# Patient Record
Sex: MALE | Race: WHITE | NOT HISPANIC OR LATINO | Employment: OTHER | URBAN - METROPOLITAN AREA
[De-identification: names, ages, dates, MRNs, and addresses within clinical notes are randomized per-mention and may not be internally consistent; named-entity substitution may affect disease eponyms.]

---

## 2024-02-03 ENCOUNTER — APPOINTMENT (OUTPATIENT)
Dept: RADIOLOGY | Facility: HOSPITAL | Age: 64
DRG: 871 | End: 2024-02-03
Payer: COMMERCIAL

## 2024-02-03 ENCOUNTER — HOSPITAL ENCOUNTER (INPATIENT)
Facility: HOSPITAL | Age: 64
LOS: 3 days | Discharge: HOME | DRG: 871 | End: 2024-02-07
Attending: STUDENT IN AN ORGANIZED HEALTH CARE EDUCATION/TRAINING PROGRAM | Admitting: INTERNAL MEDICINE
Payer: COMMERCIAL

## 2024-02-03 ENCOUNTER — APPOINTMENT (OUTPATIENT)
Dept: CARDIOLOGY | Facility: HOSPITAL | Age: 64
DRG: 871 | End: 2024-02-03
Payer: COMMERCIAL

## 2024-02-03 DIAGNOSIS — I63.89 OTHER CEREBRAL INFARCTION (MULTI): ICD-10-CM

## 2024-02-03 DIAGNOSIS — N17.9 AKI (ACUTE KIDNEY INJURY) (CMS-HCC): ICD-10-CM

## 2024-02-03 DIAGNOSIS — E83.42 HYPOMAGNESEMIA: ICD-10-CM

## 2024-02-03 DIAGNOSIS — I48.0 AF (PAROXYSMAL ATRIAL FIBRILLATION) (MULTI): ICD-10-CM

## 2024-02-03 DIAGNOSIS — R11.14 BILIOUS VOMITING WITHOUT NAUSEA: ICD-10-CM

## 2024-02-03 DIAGNOSIS — R78.81 BACTEREMIA: ICD-10-CM

## 2024-02-03 DIAGNOSIS — I48.19 OTHER PERSISTENT ATRIAL FIBRILLATION (MULTI): ICD-10-CM

## 2024-02-03 DIAGNOSIS — R41.0 DISORIENTATION, UNSPECIFIED: ICD-10-CM

## 2024-02-03 DIAGNOSIS — R41.82 ALTERED MENTAL STATUS, UNSPECIFIED ALTERED MENTAL STATUS TYPE: ICD-10-CM

## 2024-02-03 DIAGNOSIS — E87.6 HYPOKALEMIA: Primary | ICD-10-CM

## 2024-02-03 DIAGNOSIS — E53.8 B12 DEFICIENCY: ICD-10-CM

## 2024-02-03 DIAGNOSIS — R09.89 OTHER SPECIFIED SYMPTOMS AND SIGNS INVOLVING THE CIRCULATORY AND RESPIRATORY SYSTEMS: ICD-10-CM

## 2024-02-03 LAB
ALBUMIN SERPL BCP-MCNC: 3.8 G/DL (ref 3.4–5)
ALP SERPL-CCNC: 53 U/L (ref 33–136)
ALT SERPL W P-5'-P-CCNC: 18 U/L (ref 10–52)
ANION GAP BLDV CALCULATED.4IONS-SCNC: 9 MMOL/L (ref 10–25)
ANION GAP SERPL CALC-SCNC: 16 MMOL/L (ref 10–20)
AST SERPL W P-5'-P-CCNC: 16 U/L (ref 9–39)
BASE EXCESS BLDV CALC-SCNC: 1.9 MMOL/L (ref -2–3)
BASOPHILS # BLD AUTO: 0.08 X10*3/UL (ref 0–0.1)
BASOPHILS NFR BLD AUTO: 0.4 %
BILIRUB SERPL-MCNC: 0.6 MG/DL (ref 0–1.2)
BNP SERPL-MCNC: 207 PG/ML (ref 0–99)
BODY TEMPERATURE: 37 DEGREES CELSIUS
BUN SERPL-MCNC: 11 MG/DL (ref 6–23)
CA-I BLDV-SCNC: 1.16 MMOL/L (ref 1.1–1.33)
CALCIUM SERPL-MCNC: 9.3 MG/DL (ref 8.6–10.3)
CARDIAC TROPONIN I PNL SERPL HS: 11 NG/L (ref 0–20)
CARDIAC TROPONIN I PNL SERPL HS: 12 NG/L (ref 0–20)
CHLORIDE BLDV-SCNC: 98 MMOL/L (ref 98–107)
CHLORIDE SERPL-SCNC: 97 MMOL/L (ref 98–107)
CO2 SERPL-SCNC: 21 MMOL/L (ref 21–32)
CREAT SERPL-MCNC: 0.93 MG/DL (ref 0.5–1.3)
D DIMER PPP FEU-MCNC: 857 NG/ML FEU
EGFRCR SERPLBLD CKD-EPI 2021: >90 ML/MIN/1.73M*2
EOSINOPHIL # BLD AUTO: 0.02 X10*3/UL (ref 0–0.7)
EOSINOPHIL NFR BLD AUTO: 0.1 %
ERYTHROCYTE [DISTWIDTH] IN BLOOD BY AUTOMATED COUNT: 14.1 % (ref 11.5–14.5)
FLUAV RNA RESP QL NAA+PROBE: NOT DETECTED
FLUBV RNA RESP QL NAA+PROBE: NOT DETECTED
GLUCOSE BLDV-MCNC: 114 MG/DL (ref 74–99)
GLUCOSE SERPL-MCNC: 103 MG/DL (ref 74–99)
HCO3 BLDV-SCNC: 25.7 MMOL/L (ref 22–26)
HCT VFR BLD AUTO: 44.5 % (ref 41–52)
HCT VFR BLD EST: 44 % (ref 41–52)
HGB BLD-MCNC: 14.3 G/DL (ref 13.5–17.5)
HGB BLDV-MCNC: 14.6 G/DL (ref 13.5–17.5)
IMM GRANULOCYTES # BLD AUTO: 0.24 X10*3/UL (ref 0–0.7)
IMM GRANULOCYTES NFR BLD AUTO: 1.1 % (ref 0–0.9)
INHALED O2 CONCENTRATION: 24 %
LACTATE BLDV-SCNC: 1.6 MMOL/L (ref 0.4–2)
LACTATE BLDV-SCNC: 2.5 MMOL/L (ref 0.4–2)
LIPASE SERPL-CCNC: 9 U/L (ref 9–82)
LYMPHOCYTES # BLD AUTO: 0.85 X10*3/UL (ref 1.2–4.8)
LYMPHOCYTES NFR BLD AUTO: 4 %
MAGNESIUM SERPL-MCNC: 1.33 MG/DL (ref 1.6–2.4)
MCH RBC QN AUTO: 26.1 PG (ref 26–34)
MCHC RBC AUTO-ENTMCNC: 32.1 G/DL (ref 32–36)
MCV RBC AUTO: 81 FL (ref 80–100)
MONOCYTES # BLD AUTO: 0.76 X10*3/UL (ref 0.1–1)
MONOCYTES NFR BLD AUTO: 3.6 %
MRSA DNA SPEC QL NAA+PROBE: NOT DETECTED
NEUTROPHILS # BLD AUTO: 19.04 X10*3/UL (ref 1.2–7.7)
NEUTROPHILS NFR BLD AUTO: 90.8 %
NRBC BLD-RTO: 0 /100 WBCS (ref 0–0)
OXYHGB MFR BLDV: 75 % (ref 45–75)
PCO2 BLDV: 37 MM HG (ref 41–51)
PH BLDV: 7.45 PH (ref 7.33–7.43)
PLATELET # BLD AUTO: 256 X10*3/UL (ref 150–450)
PO2 BLDV: 47 MM HG (ref 35–45)
POTASSIUM BLDV-SCNC: 4.8 MMOL/L (ref 3.5–5.3)
POTASSIUM SERPL-SCNC: 4.6 MMOL/L (ref 3.5–5.3)
PROT SERPL-MCNC: 8.1 G/DL (ref 6.4–8.2)
RBC # BLD AUTO: 5.47 X10*6/UL (ref 4.5–5.9)
SAO2 % BLDV: 77 % (ref 45–75)
SARS-COV-2 RNA RESP QL NAA+PROBE: NOT DETECTED
SODIUM BLDV-SCNC: 128 MMOL/L (ref 136–145)
SODIUM SERPL-SCNC: 129 MMOL/L (ref 136–145)
WBC # BLD AUTO: 21 X10*3/UL (ref 4.4–11.3)

## 2024-02-03 PROCEDURE — 2550000001 HC RX 255 CONTRASTS: Performed by: STUDENT IN AN ORGANIZED HEALTH CARE EDUCATION/TRAINING PROGRAM

## 2024-02-03 PROCEDURE — 87636 SARSCOV2 & INF A&B AMP PRB: CPT | Performed by: STUDENT IN AN ORGANIZED HEALTH CARE EDUCATION/TRAINING PROGRAM

## 2024-02-03 PROCEDURE — 84132 ASSAY OF SERUM POTASSIUM: CPT | Performed by: STUDENT IN AN ORGANIZED HEALTH CARE EDUCATION/TRAINING PROGRAM

## 2024-02-03 PROCEDURE — 2500000004 HC RX 250 GENERAL PHARMACY W/ HCPCS (ALT 636 FOR OP/ED): Performed by: STUDENT IN AN ORGANIZED HEALTH CARE EDUCATION/TRAINING PROGRAM

## 2024-02-03 PROCEDURE — 36415 COLL VENOUS BLD VENIPUNCTURE: CPT | Performed by: STUDENT IN AN ORGANIZED HEALTH CARE EDUCATION/TRAINING PROGRAM

## 2024-02-03 PROCEDURE — 83880 ASSAY OF NATRIURETIC PEPTIDE: CPT | Performed by: STUDENT IN AN ORGANIZED HEALTH CARE EDUCATION/TRAINING PROGRAM

## 2024-02-03 PROCEDURE — 87641 MR-STAPH DNA AMP PROBE: CPT | Performed by: STUDENT IN AN ORGANIZED HEALTH CARE EDUCATION/TRAINING PROGRAM

## 2024-02-03 PROCEDURE — 85025 COMPLETE CBC W/AUTO DIFF WBC: CPT | Performed by: STUDENT IN AN ORGANIZED HEALTH CARE EDUCATION/TRAINING PROGRAM

## 2024-02-03 PROCEDURE — 99291 CRITICAL CARE FIRST HOUR: CPT | Mod: 25 | Performed by: STUDENT IN AN ORGANIZED HEALTH CARE EDUCATION/TRAINING PROGRAM

## 2024-02-03 PROCEDURE — 83690 ASSAY OF LIPASE: CPT | Performed by: STUDENT IN AN ORGANIZED HEALTH CARE EDUCATION/TRAINING PROGRAM

## 2024-02-03 PROCEDURE — 85379 FIBRIN DEGRADATION QUANT: CPT | Performed by: STUDENT IN AN ORGANIZED HEALTH CARE EDUCATION/TRAINING PROGRAM

## 2024-02-03 PROCEDURE — 96372 THER/PROPH/DIAG INJ SC/IM: CPT

## 2024-02-03 PROCEDURE — 87040 BLOOD CULTURE FOR BACTERIA: CPT | Mod: PORLAB | Performed by: STUDENT IN AN ORGANIZED HEALTH CARE EDUCATION/TRAINING PROGRAM

## 2024-02-03 PROCEDURE — 96365 THER/PROPH/DIAG IV INF INIT: CPT

## 2024-02-03 PROCEDURE — 71045 X-RAY EXAM CHEST 1 VIEW: CPT | Mod: FR

## 2024-02-03 PROCEDURE — 71045 X-RAY EXAM CHEST 1 VIEW: CPT | Mod: FOREIGN READ | Performed by: RADIOLOGY

## 2024-02-03 PROCEDURE — 83735 ASSAY OF MAGNESIUM: CPT | Performed by: STUDENT IN AN ORGANIZED HEALTH CARE EDUCATION/TRAINING PROGRAM

## 2024-02-03 PROCEDURE — 96367 TX/PROPH/DG ADDL SEQ IV INF: CPT

## 2024-02-03 PROCEDURE — 83605 ASSAY OF LACTIC ACID: CPT | Performed by: STUDENT IN AN ORGANIZED HEALTH CARE EDUCATION/TRAINING PROGRAM

## 2024-02-03 PROCEDURE — 96366 THER/PROPH/DIAG IV INF ADDON: CPT

## 2024-02-03 PROCEDURE — 93005 ELECTROCARDIOGRAM TRACING: CPT

## 2024-02-03 PROCEDURE — 71275 CT ANGIOGRAPHY CHEST: CPT | Mod: FOREIGN READ | Performed by: RADIOLOGY

## 2024-02-03 PROCEDURE — 71275 CT ANGIOGRAPHY CHEST: CPT | Mod: FR

## 2024-02-03 PROCEDURE — 84484 ASSAY OF TROPONIN QUANT: CPT | Performed by: STUDENT IN AN ORGANIZED HEALTH CARE EDUCATION/TRAINING PROGRAM

## 2024-02-03 RX ORDER — KETOROLAC TROMETHAMINE 30 MG/ML
15 INJECTION, SOLUTION INTRAMUSCULAR; INTRAVENOUS ONCE
Status: COMPLETED | OUTPATIENT
Start: 2024-02-03 | End: 2024-02-04

## 2024-02-03 RX ADMIN — AZITHROMYCIN MONOHYDRATE 500 MG: 500 INJECTION, POWDER, LYOPHILIZED, FOR SOLUTION INTRAVENOUS at 21:07

## 2024-02-03 RX ADMIN — VANCOMYCIN HYDROCHLORIDE 2 G: 10 INJECTION, POWDER, LYOPHILIZED, FOR SOLUTION INTRAVENOUS at 22:58

## 2024-02-03 RX ADMIN — IOHEXOL 75 ML: 350 INJECTION, SOLUTION INTRAVENOUS at 22:22

## 2024-02-03 RX ADMIN — SODIUM CHLORIDE 500 ML: 9 INJECTION, SOLUTION INTRAVENOUS at 20:40

## 2024-02-03 RX ADMIN — PIPERACILLIN SODIUM AND TAZOBACTAM SODIUM 4.5 G: 4; .5 INJECTION, SOLUTION INTRAVENOUS at 21:07

## 2024-02-03 ASSESSMENT — LIFESTYLE VARIABLES
EVER HAD A DRINK FIRST THING IN THE MORNING TO STEADY YOUR NERVES TO GET RID OF A HANGOVER: NO
EVER FELT BAD OR GUILTY ABOUT YOUR DRINKING: NO
HAVE YOU EVER FELT YOU SHOULD CUT DOWN ON YOUR DRINKING: NO
HAVE PEOPLE ANNOYED YOU BY CRITICIZING YOUR DRINKING: NO

## 2024-02-03 ASSESSMENT — COLUMBIA-SUICIDE SEVERITY RATING SCALE - C-SSRS
1. IN THE PAST MONTH, HAVE YOU WISHED YOU WERE DEAD OR WISHED YOU COULD GO TO SLEEP AND NOT WAKE UP?: NO
6. HAVE YOU EVER DONE ANYTHING, STARTED TO DO ANYTHING, OR PREPARED TO DO ANYTHING TO END YOUR LIFE?: NO
2. HAVE YOU ACTUALLY HAD ANY THOUGHTS OF KILLING YOURSELF?: NO

## 2024-02-03 ASSESSMENT — PAIN SCALES - GENERAL: PAINLEVEL_OUTOF10: 0 - NO PAIN

## 2024-02-03 ASSESSMENT — PAIN - FUNCTIONAL ASSESSMENT: PAIN_FUNCTIONAL_ASSESSMENT: 0-10

## 2024-02-04 ENCOUNTER — APPOINTMENT (OUTPATIENT)
Dept: CARDIOLOGY | Facility: HOSPITAL | Age: 64
DRG: 871 | End: 2024-02-04
Payer: COMMERCIAL

## 2024-02-04 ENCOUNTER — APPOINTMENT (OUTPATIENT)
Dept: RADIOLOGY | Facility: HOSPITAL | Age: 64
DRG: 871 | End: 2024-02-04
Payer: COMMERCIAL

## 2024-02-04 PROBLEM — E87.6 HYPOKALEMIA: Status: ACTIVE | Noted: 2024-02-04

## 2024-02-04 LAB
ALBUMIN SERPL BCP-MCNC: 3.1 G/DL (ref 3.4–5)
AMPHETAMINES UR QL SCN: NORMAL
ANION GAP BLDV CALCULATED.4IONS-SCNC: 6 MMOL/L (ref 10–25)
ANION GAP SERPL CALC-SCNC: 11 MMOL/L (ref 10–20)
APAP SERPL-MCNC: <10 UG/ML
APPEARANCE UR: CLEAR
BARBITURATES UR QL SCN: NORMAL
BASE EXCESS BLDV CALC-SCNC: 2.7 MMOL/L (ref -2–3)
BENZODIAZ UR QL SCN: NORMAL
BILIRUB UR STRIP.AUTO-MCNC: NEGATIVE MG/DL
BODY TEMPERATURE: 37 DEGREES CELSIUS
BUN SERPL-MCNC: 12 MG/DL (ref 6–23)
BZE UR QL SCN: NORMAL
CA-I BLDV-SCNC: 1.13 MMOL/L (ref 1.1–1.33)
CALCIUM SERPL-MCNC: 8.3 MG/DL (ref 8.6–10.3)
CANNABINOIDS UR QL SCN: NORMAL
CARDIAC TROPONIN I PNL SERPL HS: 15 NG/L (ref 0–20)
CHLORIDE BLDV-SCNC: 98 MMOL/L (ref 98–107)
CHLORIDE SERPL-SCNC: 97 MMOL/L (ref 98–107)
CHOLEST SERPL-MCNC: 86 MG/DL (ref 0–199)
CHOLESTEROL/HDL RATIO: 3
CO2 SERPL-SCNC: 25 MMOL/L (ref 21–32)
COLOR UR: YELLOW
CREAT SERPL-MCNC: 1.01 MG/DL (ref 0.5–1.3)
CREAT UR-MCNC: 124.3 MG/DL (ref 20–370)
CRP SERPL-MCNC: 14.81 MG/DL
EGFRCR SERPLBLD CKD-EPI 2021: 83 ML/MIN/1.73M*2
ERYTHROCYTE [DISTWIDTH] IN BLOOD BY AUTOMATED COUNT: 14.2 % (ref 11.5–14.5)
ERYTHROCYTE [SEDIMENTATION RATE] IN BLOOD BY WESTERGREN METHOD: 93 MM/H (ref 0–20)
ETHANOL SERPL-MCNC: <10 MG/DL
FENTANYL+NORFENTANYL UR QL SCN: NORMAL
GLUCOSE BLDV-MCNC: 111 MG/DL (ref 74–99)
GLUCOSE SERPL-MCNC: 95 MG/DL (ref 74–99)
GLUCOSE UR STRIP.AUTO-MCNC: NEGATIVE MG/DL
HCO3 BLDV-SCNC: 26.2 MMOL/L (ref 22–26)
HCT VFR BLD AUTO: 39.9 % (ref 41–52)
HCT VFR BLD EST: 40 % (ref 41–52)
HDLC SERPL-MCNC: 28.8 MG/DL
HGB BLD-MCNC: 12.6 G/DL (ref 13.5–17.5)
HGB BLDV-MCNC: 13.3 G/DL (ref 13.5–17.5)
HOLD SPECIMEN: 293
INHALED O2 CONCENTRATION: 24 %
KETONES UR STRIP.AUTO-MCNC: NEGATIVE MG/DL
LACTATE BLDV-SCNC: 1 MMOL/L (ref 0.4–2)
LACTATE SERPL-SCNC: 1.1 MMOL/L (ref 0.4–2)
LDLC SERPL CALC-MCNC: 45 MG/DL
LEUKOCYTE ESTERASE UR QL STRIP.AUTO: NEGATIVE
MAGNESIUM SERPL-MCNC: 1.75 MG/DL (ref 1.6–2.4)
MCH RBC QN AUTO: 26 PG (ref 26–34)
MCHC RBC AUTO-ENTMCNC: 31.6 G/DL (ref 32–36)
MCV RBC AUTO: 82 FL (ref 80–100)
MRSA DNA SPEC QL NAA+PROBE: NOT DETECTED
MUCOUS THREADS #/AREA URNS AUTO: ABNORMAL /LPF
NITRITE UR QL STRIP.AUTO: NEGATIVE
NON HDL CHOLESTEROL: 57 MG/DL (ref 0–149)
NRBC BLD-RTO: 0 /100 WBCS (ref 0–0)
OPIATES UR QL SCN: NORMAL
OSMOLALITY SERPL: 281 MOSM/KG (ref 280–300)
OSMOLALITY UR: 625 MOSM/KG (ref 200–1200)
OXYCODONE+OXYMORPHONE UR QL SCN: NORMAL
OXYHGB MFR BLDV: 94.2 % (ref 45–75)
PCO2 BLDV: 36 MM HG (ref 41–51)
PCP UR QL SCN: NORMAL
PH BLDV: 7.47 PH (ref 7.33–7.43)
PH UR STRIP.AUTO: 7 [PH]
PHOSPHATE SERPL-MCNC: 4.5 MG/DL (ref 2.5–4.9)
PLATELET # BLD AUTO: 213 X10*3/UL (ref 150–450)
PO2 BLDV: 73 MM HG (ref 35–45)
POTASSIUM BLDV-SCNC: 4.3 MMOL/L (ref 3.5–5.3)
POTASSIUM SERPL-SCNC: 4 MMOL/L (ref 3.5–5.3)
PROT UR STRIP.AUTO-MCNC: NEGATIVE MG/DL
RBC # BLD AUTO: 4.84 X10*6/UL (ref 4.5–5.9)
RBC # UR STRIP.AUTO: ABNORMAL /UL
RBC #/AREA URNS AUTO: >20 /HPF
SALICYLATES SERPL-MCNC: <3 MG/DL
SAO2 % BLDV: 96 % (ref 45–75)
SODIUM BLDV-SCNC: 126 MMOL/L (ref 136–145)
SODIUM SERPL-SCNC: 129 MMOL/L (ref 136–145)
SODIUM UR-SCNC: 101 MMOL/L
SODIUM/CREAT UR-RTO: 81 MMOL/G CREAT
SP GR UR STRIP.AUTO: 1.04
TRIGL SERPL-MCNC: 60 MG/DL (ref 0–149)
TSH SERPL-ACNC: 1.12 MIU/L (ref 0.44–3.98)
UFH PPP CHRO-ACNC: 0.4 IU/ML
UFH PPP CHRO-ACNC: 0.5 IU/ML
UROBILINOGEN UR STRIP.AUTO-MCNC: <2 MG/DL
VIT B12 SERPL-MCNC: 211 PG/ML (ref 211–911)
VLDL: 12 MG/DL (ref 0–40)
WBC # BLD AUTO: 16.1 X10*3/UL (ref 4.4–11.3)
WBC #/AREA URNS AUTO: ABNORMAL /HPF

## 2024-02-04 PROCEDURE — 80179 DRUG ASSAY SALICYLATE: CPT | Performed by: EMERGENCY MEDICINE

## 2024-02-04 PROCEDURE — 2500000002 HC RX 250 W HCPCS SELF ADMINISTERED DRUGS (ALT 637 FOR MEDICARE OP, ALT 636 FOR OP/ED): Performed by: INTERNAL MEDICINE

## 2024-02-04 PROCEDURE — 83935 ASSAY OF URINE OSMOLALITY: CPT | Mod: PORLAB | Performed by: INTERNAL MEDICINE

## 2024-02-04 PROCEDURE — 2500000004 HC RX 250 GENERAL PHARMACY W/ HCPCS (ALT 636 FOR OP/ED): Performed by: EMERGENCY MEDICINE

## 2024-02-04 PROCEDURE — 99223 1ST HOSP IP/OBS HIGH 75: CPT | Performed by: INTERNAL MEDICINE

## 2024-02-04 PROCEDURE — 36415 COLL VENOUS BLD VENIPUNCTURE: CPT | Performed by: INTERNAL MEDICINE

## 2024-02-04 PROCEDURE — 36415 COLL VENOUS BLD VENIPUNCTURE: CPT | Performed by: STUDENT IN AN ORGANIZED HEALTH CARE EDUCATION/TRAINING PROGRAM

## 2024-02-04 PROCEDURE — 85520 HEPARIN ASSAY: CPT | Performed by: INTERNAL MEDICINE

## 2024-02-04 PROCEDURE — 71275 CT ANGIOGRAPHY CHEST: CPT

## 2024-02-04 PROCEDURE — 86140 C-REACTIVE PROTEIN: CPT | Performed by: INTERNAL MEDICINE

## 2024-02-04 PROCEDURE — 80307 DRUG TEST PRSMV CHEM ANLYZR: CPT | Performed by: EMERGENCY MEDICINE

## 2024-02-04 PROCEDURE — 83930 ASSAY OF BLOOD OSMOLALITY: CPT | Mod: PORLAB | Performed by: INTERNAL MEDICINE

## 2024-02-04 PROCEDURE — 93005 ELECTROCARDIOGRAM TRACING: CPT

## 2024-02-04 PROCEDURE — 70450 CT HEAD/BRAIN W/O DYE: CPT

## 2024-02-04 PROCEDURE — 84443 ASSAY THYROID STIM HORMONE: CPT | Performed by: INTERNAL MEDICINE

## 2024-02-04 PROCEDURE — 81001 URINALYSIS AUTO W/SCOPE: CPT | Performed by: STUDENT IN AN ORGANIZED HEALTH CARE EDUCATION/TRAINING PROGRAM

## 2024-02-04 PROCEDURE — 2500000004 HC RX 250 GENERAL PHARMACY W/ HCPCS (ALT 636 FOR OP/ED): Performed by: INTERNAL MEDICINE

## 2024-02-04 PROCEDURE — 74174 CTA ABD&PLVS W/CONTRAST: CPT | Performed by: RADIOLOGY

## 2024-02-04 PROCEDURE — 84484 ASSAY OF TROPONIN QUANT: CPT | Performed by: EMERGENCY MEDICINE

## 2024-02-04 PROCEDURE — 96367 TX/PROPH/DG ADDL SEQ IV INF: CPT

## 2024-02-04 PROCEDURE — 94640 AIRWAY INHALATION TREATMENT: CPT

## 2024-02-04 PROCEDURE — 83735 ASSAY OF MAGNESIUM: CPT | Performed by: INTERNAL MEDICINE

## 2024-02-04 PROCEDURE — 2500000005 HC RX 250 GENERAL PHARMACY W/O HCPCS: Performed by: STUDENT IN AN ORGANIZED HEALTH CARE EDUCATION/TRAINING PROGRAM

## 2024-02-04 PROCEDURE — 82607 VITAMIN B-12: CPT | Performed by: PSYCHIATRY & NEUROLOGY

## 2024-02-04 PROCEDURE — 2500000004 HC RX 250 GENERAL PHARMACY W/ HCPCS (ALT 636 FOR OP/ED): Performed by: STUDENT IN AN ORGANIZED HEALTH CARE EDUCATION/TRAINING PROGRAM

## 2024-02-04 PROCEDURE — 83605 ASSAY OF LACTIC ACID: CPT | Performed by: INTERNAL MEDICINE

## 2024-02-04 PROCEDURE — 85652 RBC SED RATE AUTOMATED: CPT | Performed by: INTERNAL MEDICINE

## 2024-02-04 PROCEDURE — 96375 TX/PRO/DX INJ NEW DRUG ADDON: CPT

## 2024-02-04 PROCEDURE — 71275 CT ANGIOGRAPHY CHEST: CPT | Performed by: RADIOLOGY

## 2024-02-04 PROCEDURE — 84132 ASSAY OF SERUM POTASSIUM: CPT | Performed by: STUDENT IN AN ORGANIZED HEALTH CARE EDUCATION/TRAINING PROGRAM

## 2024-02-04 PROCEDURE — 87040 BLOOD CULTURE FOR BACTERIA: CPT | Mod: PORLAB | Performed by: INTERNAL MEDICINE

## 2024-02-04 PROCEDURE — 87641 MR-STAPH DNA AMP PROBE: CPT | Performed by: INTERNAL MEDICINE

## 2024-02-04 PROCEDURE — 82570 ASSAY OF URINE CREATININE: CPT | Performed by: INTERNAL MEDICINE

## 2024-02-04 PROCEDURE — 2500000005 HC RX 250 GENERAL PHARMACY W/O HCPCS: Performed by: EMERGENCY MEDICINE

## 2024-02-04 PROCEDURE — 80061 LIPID PANEL: CPT | Performed by: INTERNAL MEDICINE

## 2024-02-04 PROCEDURE — 85027 COMPLETE CBC AUTOMATED: CPT | Performed by: INTERNAL MEDICINE

## 2024-02-04 PROCEDURE — 96366 THER/PROPH/DIAG IV INF ADDON: CPT

## 2024-02-04 PROCEDURE — 99223 1ST HOSP IP/OBS HIGH 75: CPT | Performed by: PSYCHIATRY & NEUROLOGY

## 2024-02-04 PROCEDURE — 83036 HEMOGLOBIN GLYCOSYLATED A1C: CPT | Performed by: INTERNAL MEDICINE

## 2024-02-04 PROCEDURE — 84132 ASSAY OF SERUM POTASSIUM: CPT | Performed by: INTERNAL MEDICINE

## 2024-02-04 PROCEDURE — 70450 CT HEAD/BRAIN W/O DYE: CPT | Performed by: RADIOLOGY

## 2024-02-04 PROCEDURE — 2020000001 HC ICU ROOM DAILY

## 2024-02-04 PROCEDURE — 99222 1ST HOSP IP/OBS MODERATE 55: CPT | Performed by: INTERNAL MEDICINE

## 2024-02-04 RX ORDER — DIGOXIN 0.25 MG/ML
500 INJECTION INTRAMUSCULAR; INTRAVENOUS ONCE
Status: COMPLETED | OUTPATIENT
Start: 2024-02-04 | End: 2024-02-04

## 2024-02-04 RX ORDER — MAGNESIUM SULFATE HEPTAHYDRATE 40 MG/ML
2 INJECTION, SOLUTION INTRAVENOUS ONCE
Status: COMPLETED | OUTPATIENT
Start: 2024-02-04 | End: 2024-02-04

## 2024-02-04 RX ORDER — HEPARIN SODIUM 5000 [USP'U]/ML
7500 INJECTION, SOLUTION INTRAVENOUS; SUBCUTANEOUS EVERY 8 HOURS SCHEDULED
Status: DISCONTINUED | OUTPATIENT
Start: 2024-02-04 | End: 2024-02-04

## 2024-02-04 RX ORDER — MONTELUKAST SODIUM 10 MG/1
10 TABLET ORAL NIGHTLY
COMMUNITY
End: 2024-03-06

## 2024-02-04 RX ORDER — HEPARIN SODIUM 5000 [USP'U]/ML
10000 INJECTION, SOLUTION INTRAVENOUS; SUBCUTANEOUS ONCE
Status: COMPLETED | OUTPATIENT
Start: 2024-02-04 | End: 2024-02-04

## 2024-02-04 RX ORDER — VENLAFAXINE HYDROCHLORIDE 37.5 MG/1
37.5 CAPSULE, EXTENDED RELEASE ORAL DAILY
COMMUNITY
End: 2024-03-06 | Stop reason: SDUPTHER

## 2024-02-04 RX ORDER — FLUTICASONE FUROATE AND VILANTEROL 200; 25 UG/1; UG/1
1 POWDER RESPIRATORY (INHALATION) DAILY
COMMUNITY
End: 2024-03-06 | Stop reason: SDUPTHER

## 2024-02-04 RX ORDER — VANCOMYCIN HYDROCHLORIDE 1 G/200ML
1000 INJECTION, SOLUTION INTRAVENOUS EVERY 12 HOURS
Status: DISCONTINUED | OUTPATIENT
Start: 2024-02-04 | End: 2024-02-05

## 2024-02-04 RX ORDER — METOPROLOL TARTRATE 1 MG/ML
2.5 INJECTION, SOLUTION INTRAVENOUS ONCE
Status: COMPLETED | OUTPATIENT
Start: 2024-02-04 | End: 2024-02-04

## 2024-02-04 RX ORDER — ACETAMINOPHEN 325 MG/1
650 TABLET ORAL EVERY 4 HOURS PRN
Status: DISCONTINUED | OUTPATIENT
Start: 2024-02-04 | End: 2024-02-07 | Stop reason: HOSPADM

## 2024-02-04 RX ORDER — FUROSEMIDE 10 MG/ML
20 INJECTION INTRAMUSCULAR; INTRAVENOUS DAILY
Status: DISCONTINUED | OUTPATIENT
Start: 2024-02-04 | End: 2024-02-04

## 2024-02-04 RX ORDER — SPIRONOLACTONE 25 MG/1
25 TABLET ORAL DAILY
COMMUNITY
End: 2024-03-06 | Stop reason: SDUPTHER

## 2024-02-04 RX ORDER — DIGOXIN 0.25 MG/ML
125 INJECTION INTRAMUSCULAR; INTRAVENOUS DAILY
Status: DISCONTINUED | OUTPATIENT
Start: 2024-02-05 | End: 2024-02-07 | Stop reason: HOSPADM

## 2024-02-04 RX ORDER — FUROSEMIDE 10 MG/ML
40 INJECTION INTRAMUSCULAR; INTRAVENOUS DAILY
Status: DISCONTINUED | OUTPATIENT
Start: 2024-02-05 | End: 2024-02-07

## 2024-02-04 RX ORDER — HEPARIN SODIUM 5000 [USP'U]/ML
5000-10000 INJECTION, SOLUTION INTRAVENOUS; SUBCUTANEOUS EVERY 4 HOURS PRN
Status: DISCONTINUED | OUTPATIENT
Start: 2024-02-04 | End: 2024-02-07

## 2024-02-04 RX ORDER — ATORVASTATIN CALCIUM 40 MG/1
40 TABLET, FILM COATED ORAL DAILY
COMMUNITY
End: 2024-03-06 | Stop reason: SDUPTHER

## 2024-02-04 RX ORDER — METOPROLOL TARTRATE 1 MG/ML
5 INJECTION, SOLUTION INTRAVENOUS ONCE
Status: COMPLETED | OUTPATIENT
Start: 2024-02-04 | End: 2024-02-04

## 2024-02-04 RX ORDER — ACETAMINOPHEN 325 MG/1
650 TABLET ORAL ONCE
Status: COMPLETED | OUTPATIENT
Start: 2024-02-04 | End: 2024-02-04

## 2024-02-04 RX ORDER — LANOLIN ALCOHOL/MO/W.PET/CERES
1000 CREAM (GRAM) TOPICAL DAILY
Status: DISCONTINUED | OUTPATIENT
Start: 2024-02-04 | End: 2024-02-07 | Stop reason: HOSPADM

## 2024-02-04 RX ORDER — IPRATROPIUM BROMIDE AND ALBUTEROL SULFATE 2.5; .5 MG/3ML; MG/3ML
3 SOLUTION RESPIRATORY (INHALATION) EVERY 6 HOURS PRN
Status: DISCONTINUED | OUTPATIENT
Start: 2024-02-04 | End: 2024-02-07 | Stop reason: HOSPADM

## 2024-02-04 RX ORDER — METOPROLOL TARTRATE 1 MG/ML
5 INJECTION, SOLUTION INTRAVENOUS EVERY 6 HOURS PRN
Status: DISCONTINUED | OUTPATIENT
Start: 2024-02-04 | End: 2024-02-07 | Stop reason: HOSPADM

## 2024-02-04 RX ORDER — DIGOXIN 0.25 MG/ML
250 INJECTION INTRAMUSCULAR; INTRAVENOUS ONCE
Status: DISCONTINUED | OUTPATIENT
Start: 2024-02-04 | End: 2024-02-07 | Stop reason: HOSPADM

## 2024-02-04 RX ORDER — FLUTICASONE FUROATE AND VILANTEROL 200; 25 UG/1; UG/1
1 POWDER RESPIRATORY (INHALATION) DAILY
Status: DISCONTINUED | OUTPATIENT
Start: 2024-02-04 | End: 2024-02-07 | Stop reason: HOSPADM

## 2024-02-04 RX ORDER — ONDANSETRON HYDROCHLORIDE 2 MG/ML
4 INJECTION, SOLUTION INTRAVENOUS EVERY 6 HOURS PRN
Status: DISCONTINUED | OUTPATIENT
Start: 2024-02-04 | End: 2024-02-07 | Stop reason: HOSPADM

## 2024-02-04 RX ORDER — HEPARIN SODIUM 10000 [USP'U]/100ML
0-4500 INJECTION, SOLUTION INTRAVENOUS CONTINUOUS
Status: DISCONTINUED | OUTPATIENT
Start: 2024-02-04 | End: 2024-02-07

## 2024-02-04 RX ORDER — FUROSEMIDE 20 MG/1
20 TABLET ORAL DAILY
COMMUNITY
End: 2024-02-07 | Stop reason: HOSPADM

## 2024-02-04 RX ADMIN — PIPERACILLIN SODIUM AND TAZOBACTAM SODIUM 3.38 G: 3; .375 INJECTION, SOLUTION INTRAVENOUS at 22:05

## 2024-02-04 RX ADMIN — AMIODARONE HYDROCHLORIDE 0.5 MG/MIN: 1.8 INJECTION, SOLUTION INTRAVENOUS at 17:16

## 2024-02-04 RX ADMIN — VANCOMYCIN HYDROCHLORIDE 1000 MG: 1 INJECTION, SOLUTION INTRAVENOUS at 23:54

## 2024-02-04 RX ADMIN — PIPERACILLIN SODIUM AND TAZOBACTAM SODIUM 3.38 G: 3; .375 INJECTION, SOLUTION INTRAVENOUS at 13:15

## 2024-02-04 RX ADMIN — AMIODARONE HYDROCHLORIDE 1 MG/MIN: 1.8 INJECTION, SOLUTION INTRAVENOUS at 10:22

## 2024-02-04 RX ADMIN — AMIODARONE HYDROCHLORIDE 150 MG: 1.5 INJECTION, SOLUTION INTRAVENOUS at 10:21

## 2024-02-04 RX ADMIN — KETOROLAC TROMETHAMINE 15 MG: 30 INJECTION, SOLUTION INTRAMUSCULAR at 00:11

## 2024-02-04 RX ADMIN — METOPROLOL TARTRATE 2.5 MG: 5 INJECTION INTRAVENOUS at 01:05

## 2024-02-04 RX ADMIN — DIGOXIN 500 MCG: 0.25 INJECTION INTRAMUSCULAR; INTRAVENOUS at 10:23

## 2024-02-04 RX ADMIN — HEPARIN SODIUM 10000 UNITS: 5000 INJECTION INTRAVENOUS; SUBCUTANEOUS at 10:22

## 2024-02-04 RX ADMIN — VANCOMYCIN HYDROCHLORIDE 1000 MG: 1 INJECTION, SOLUTION INTRAVENOUS at 14:53

## 2024-02-04 RX ADMIN — METOPROLOL TARTRATE 5 MG: 5 INJECTION INTRAVENOUS at 02:03

## 2024-02-04 RX ADMIN — FUROSEMIDE 20 MG: 10 INJECTION, SOLUTION INTRAMUSCULAR; INTRAVENOUS at 08:49

## 2024-02-04 RX ADMIN — HEPARIN SODIUM 2000 UNITS/HR: 10000 INJECTION, SOLUTION INTRAVENOUS at 10:20

## 2024-02-04 RX ADMIN — ACETAMINOPHEN 650 MG: 325 TABLET ORAL at 01:44

## 2024-02-04 RX ADMIN — MAGNESIUM SULFATE HEPTAHYDRATE 2 G: 40 INJECTION, SOLUTION INTRAVENOUS at 01:44

## 2024-02-04 RX ADMIN — PIPERACILLIN SODIUM AND TAZOBACTAM SODIUM 3.38 G: 3; .375 INJECTION, SOLUTION INTRAVENOUS at 04:58

## 2024-02-04 RX ADMIN — FLUTICASONE FUROATE AND VILANTEROL TRIFENATATE 1 PUFF: 200; 25 POWDER RESPIRATORY (INHALATION) at 09:38

## 2024-02-04 RX ADMIN — HEPARIN SODIUM 7500 UNITS: 5000 INJECTION INTRAVENOUS; SUBCUTANEOUS at 05:16

## 2024-02-04 SDOH — SOCIAL STABILITY: SOCIAL INSECURITY: ABUSE: ADULT

## 2024-02-04 SDOH — SOCIAL STABILITY: SOCIAL INSECURITY: WERE YOU ABLE TO COMPLETE ALL THE BEHAVIORAL HEALTH SCREENINGS?: YES

## 2024-02-04 SDOH — SOCIAL STABILITY: SOCIAL INSECURITY: HAVE YOU HAD THOUGHTS OF HARMING ANYONE ELSE?: NO

## 2024-02-04 ASSESSMENT — ACTIVITIES OF DAILY LIVING (ADL)
HEARING - LEFT EAR: FUNCTIONAL
FEEDING YOURSELF: INDEPENDENT
WALKS IN HOME: INDEPENDENT
JUDGMENT_ADEQUATE_SAFELY_COMPLETE_DAILY_ACTIVITIES: YES
PATIENT'S MEMORY ADEQUATE TO SAFELY COMPLETE DAILY ACTIVITIES?: YES
TOILETING: INDEPENDENT
HEARING - RIGHT EAR: FUNCTIONAL
DRESSING YOURSELF: INDEPENDENT
GROOMING: INDEPENDENT
ADEQUATE_TO_COMPLETE_ADL: YES
BATHING: INDEPENDENT

## 2024-02-04 ASSESSMENT — ENCOUNTER SYMPTOMS
SHORTNESS OF BREATH: 1
DECREASED CONCENTRATION: 1
COUGH: 0
NERVOUS/ANXIOUS: 0
FREQUENCY: 0
CONFUSION: 1
DIAPHORESIS: 0
SINUS PRESSURE: 0
CHILLS: 0
WEAKNESS: 1
ABDOMINAL DISTENTION: 0
RHINORRHEA: 0
CHEST TIGHTNESS: 0
SEIZURES: 0
CONSTIPATION: 0
FACIAL ASYMMETRY: 0
FEVER: 0
BLOOD IN STOOL: 0
ACTIVITY CHANGE: 1
DIARRHEA: 0
SINUS PAIN: 0
FLANK PAIN: 0
SORE THROAT: 0
VOMITING: 0
NUMBNESS: 0
DIZZINESS: 0
APPETITE CHANGE: 0
SPEECH DIFFICULTY: 0
TREMORS: 0
LIGHT-HEADEDNESS: 0
HEADACHES: 0
FATIGUE: 1
NAUSEA: 0
WHEEZING: 0
PALPITATIONS: 0
DIFFICULTY URINATING: 0
DYSURIA: 0
ABDOMINAL PAIN: 0

## 2024-02-04 ASSESSMENT — COGNITIVE AND FUNCTIONAL STATUS - GENERAL
MOBILITY SCORE: 24
PATIENT BASELINE BEDBOUND: NO
DAILY ACTIVITIY SCORE: 24

## 2024-02-04 ASSESSMENT — PATIENT HEALTH QUESTIONNAIRE - PHQ9
1. LITTLE INTEREST OR PLEASURE IN DOING THINGS: NOT AT ALL
2. FEELING DOWN, DEPRESSED OR HOPELESS: NOT AT ALL
SUM OF ALL RESPONSES TO PHQ9 QUESTIONS 1 & 2: 0

## 2024-02-04 ASSESSMENT — LIFESTYLE VARIABLES
AUDIT-C TOTAL SCORE: 0
SKIP TO QUESTIONS 9-10: 1
AUDIT-C TOTAL SCORE: 0
HOW OFTEN DO YOU HAVE A DRINK CONTAINING ALCOHOL: NEVER
HOW OFTEN DO YOU HAVE 6 OR MORE DRINKS ON ONE OCCASION: NEVER
HOW MANY STANDARD DRINKS CONTAINING ALCOHOL DO YOU HAVE ON A TYPICAL DAY: PATIENT DOES NOT DRINK

## 2024-02-04 ASSESSMENT — PAIN SCALES - GENERAL
PAINLEVEL_OUTOF10: 0 - NO PAIN

## 2024-02-04 ASSESSMENT — PAIN - FUNCTIONAL ASSESSMENT
PAIN_FUNCTIONAL_ASSESSMENT: 0-10

## 2024-02-04 NOTE — PROGRESS NOTES
I have reviewed Dr. Murphy's assessment and plan as documented in his 02/04/24 H/P.      Waylon De La Fuente MD PhD

## 2024-02-04 NOTE — ED PROVIDER NOTES
HPI   Chief Complaint   Patient presents with    Shortness of Breath     X 1 week. HX of COPD has home O2, not currently on it.        This is a 63-year-old male past medical history of COPD on 2 L at baseline who recently moved from [place] upon medication review is only on spironolactone and atorvastatin.  He states that spironolactone is for swelling of his legs denies any heart issues.  Patient states that for about a week he has been feeling short of breath and acutely worsened today.  He does report not having a cough.  Denies fevers, chills, chest pain, nausea, vomiting.  He states that his left leg has gotten more swollen compared to the right.  He denies being on any blood thinners.                          M[place]us Coma Scale Score: 15                  Patient History   No past medical history on file.  No past surgical history on file.  No family history on file.  Social History     Tobacco Use    Smoking status: Not on file    Smokeless tobacco: Not on file   Substance Use Topics    Alcohol use: Not on file    Drug use: Not on file       Physical Exam   ED Triage Vitals [02/03/24 2021]   Temperature Heart Rate Respirations BP   36.8 °C (98.3 °F) 82 (!) 24 143/72      Pulse Ox Temp src Heart Rate Source Patient Position   94 % -- -- --      BP Location FiO2 (%)     -- --       Physical Exam  Constitutional:       General: He is not in acute distress.  HENT:      Head: Normocephalic and atraumatic.      Right Ear: Tympanic membrane normal.      Left Ear: Tympanic membrane normal.      Mouth/Throat:      Mouth: Mucous membranes are moist.   Eyes:      Extraocular Movements: Extraocular movements intact.      Conjunctiva/sclera: Conjunctivae normal.      Pupils: Pupils are equal, round, and reactive to light.   Cardiovascular:      Rate and Rhythm: Normal rate and regular rhythm.      Heart sounds: No murmur heard.  Pulmonary:      Effort: Pulmonary effort is normal. No respiratory distress.      Breath sounds:  Normal breath sounds. No stridor. No wheezing or rales.   Abdominal:      General: Bowel sounds are normal. There is no distension.      Tenderness: There is no abdominal tenderness. There is no guarding or rebound.   Musculoskeletal:         General: No swelling, tenderness or deformity. Normal range of motion.      Right lower leg: Edema present.      Left lower leg: Edema present.      Comments: Left leg is more swollen than right   Skin:     General: Skin is warm and dry.      Coloration: Skin is not jaundiced.      Findings: No bruising or lesion.   Neurological:      General: No focal deficit present.      Mental Status: He is alert and oriented to person, place, and time. Mental status is at baseline.      Cranial Nerves: No cranial nerve deficit.      Motor: No weakness.   Psychiatric:         Mood and Affect: Mood normal.       Labs Reviewed   CBC WITH AUTO DIFFERENTIAL - Abnormal       Result Value    WBC 21.0 (*)     nRBC 0.0      RBC 5.47      Hemoglobin 14.3      Hematocrit 44.5      MCV 81      MCH 26.1      MCHC 32.1      RDW 14.1      Platelets 256      Neutrophils % 90.8      Immature Granulocytes %, Automated 1.1 (*)     Lymphocytes % 4.0      Monocytes % 3.6      Eosinophils % 0.1      Basophils % 0.4      Neutrophils Absolute 19.04 (*)     Immature Granulocytes Absolute, Automated 0.24      Lymphocytes Absolute 0.85 (*)     Monocytes Absolute 0.76      Eosinophils Absolute 0.02      Basophils Absolute 0.08     MAGNESIUM - Abnormal    Magnesium 1.33 (*)    COMPREHENSIVE METABOLIC PANEL - Abnormal    Glucose 103 (*)     Sodium 129 (*)     Potassium 4.6      Chloride 97 (*)     Bicarbonate 21      Anion Gap 16      Urea Nitrogen 11      Creatinine 0.93      eGFR >90      Calcium 9.3      Albumin 3.8      Alkaline Phosphatase 53      Total Protein 8.1      AST 16      Bilirubin, Total 0.6      ALT 18     B-TYPE NATRIURETIC PEPTIDE - Abnormal     (*)     Narrative:        <100 pg/mL - Heart  failure unlikely  100-299 pg/mL - Intermediate probability of acute heart                  failure exacerbation. Correlate with clinical                  context and patient history.    >=300 pg/mL - Heart Failure likely. Correlate with clinical                  context and patient history.    BNP testing is performed using different testing methodology at [facility] than at other [facility]. Direct result comparisons should only be made within the same method.      D-DIMER, NON VTE - Abnormal    D-Dimer Non VTE, Quant (ng/mL FEU) 857 (*)     Narrative:     The D-Dimer assay is reported in ng/mL Fibrinogen Equivalent Units (FEU). The results of this assay should NOT be used for the exclusion of Deep Vein Thrombosis and/or Pulmonary Embolism.   BLOOD GAS VENOUS FULL PANEL - Abnormal    POCT pH, Venous 7.45 (*)     POCT pCO2, Venous 37 (*)     POCT pO2, Venous 47 (*)     POCT SO2, Venous 77 (*)     POCT Oxy Hemoglobin, Venous 75.0      POCT Hematocrit Calculated, Venous 44.0      POCT Sodium, Venous 128 (*)     POCT Potassium, Venous 4.8      POCT Chloride, Venous 98      POCT Ionized Calicum, Venous 1.16      POCT Glucose, Venous 114 (*)     POCT Lactate, Venous 2.5 (*)     POCT Base Excess, Venous 1.9      POCT HCO3 Calculated, Venous 25.7      POCT Hemoglobin, Venous 14.6      POCT Anion Gap, Venous 9.0 (*)     Patient Temperature 37.0      FiO2 24     LIPASE - Normal    Lipase 9      Narrative:     Venipuncture immediately after or during the administration of Metamizole may lead to falsely low results. Testing should be performed immediately prior to Metamizole dosing.   TROPONIN I, HIGH SENSITIVITY - Normal    Troponin I, High Sensitivity 11      Narrative:     Less than 99th percentile of normal range cutoff-  Female and children under 18 years old <14 ng/L; Male <21 ng/L: Negative  Repeat testing should be performed if clinically indicated.     Female and children under 18 years old 14-50  ng/L; Male 21-50 ng/L:  Consistent with possible cardiac damage and possible increased clinical   risk. Serial measurements may help to assess extent of myocardial damage.     >50 ng/L: Consistent with cardiac damage, increased clinical risk and  myocardial infarction. Serial measurements may help assess extent of   myocardial damage.      NOTE: Children less than 1 year old may have higher baseline troponin   levels and results should be interpreted in conjunction with the overall   clinical context.     NOTE: Troponin I testing is performed using a different   testing methodology at [facility] than at other   [facility] hospitals. Direct result comparisons should only   be made within the same method.   BLOOD CULTURE   BLOOD CULTURE   MRSA SURVEILLANCE FOR VANCOMYCIN DE-ESCALATION, PCR   INFLUENZA A AND B PCR   SARS-COV-2 PCR, SYMPTOMATIC   BLOOD GAS LACTIC ACID, VENOUS     CT angio chest for pulmonary embolism    (Results Pending)   XR chest 1 view    (Results Pending)       ED Course & MDM   ED Course as of 02/04/24 0116   Sat Feb 03, 2024   2101 POCT Lactate, Venous(!): 2.5 [CF]   2223 IMPRESSION:  Narrowing of the trachea is suggested.  Stricture or sequela are COPD  are considerations.  There could also be pathologic thickening of the  trachea.  Consider chest CT for additional evaluation.  No consolidation, pleural effusion, or pulmonary edema.   [CF]   2250 IMPRESSION:  No pulmonary artery emboli.  No acute cardiopulmonary disease.   [CF]   2250 Temperature: 37.8 °C (100.1 °F) [CF]   2250 Heart Rate(!): 135 [CF]   2250 Respirations(!): 28 [CF]   Sun Feb 04, 2024   0107 EKG at 828 February 3 shows atrial fibrillation with rapid RVR at a rate of 165 bpm, no ST elevation seen normal intervals otherwise.  No EKG to compare [CF]   0108 Atrial fibrillation at a rate 133 bpm no ST changes or elevations nonischemic EKG.  Normal intervals otherwise. [CF]      ED Course User Index  [CF] Dorothy Garcia,  MD         Diagnoses as of 02/04/24 0116   Hypokalemia   Hypomagnesemia   DEE (acute kidney injury) (CMS/formerly [facility])   Bilious vomiting without nausea       Medical Decision Making  Is a 63-year-old male past medical history of COPD on 2 L who moved a year ago back to here from [place].  He has not seen a doctor in over a year his medications are all from about a year ago.  I am unsure of how he is getting this refilled.  He came in for shortness of breath and swelling in his legs and feeling sick for about a week.  Patient was originally not tachycardic but then became tachycardic into the 160s his EKG did show A-fib with RVR.  He was also tachypneic but his lungs were clear on auscultation.  A CT angio was obtained which showed no signs of a PE.  After receiving the CT angio patient became complaining of back pain which at that time was concerning for possible aortic involvement.  Given that his kidney function within normal limits he was sent back to the CT scanner along with a CT of his head because he became altered and did not know the year.  On my read of CT scan his aorta does not appear to have a AAA or dissection.  His heart rate decreased to the 130s.  He was also given antibiotics for his white blood cells of 21 concerning for possible infection.  Upon repeat evaluation patient is now alert and oriented x 4.  He has never had atrial fibrillation in the past and is not on any anticoagulation.  He reports having no pain.  Since patient's urine does not appear infected.  Patient's BNP is 207 but is his CT images showed no signs of pulmonary edema.  Flu and COVID are negative.  And continues to not have any wheezing on exam.  Patient still pending final read for patient's CT abdomen and head.  Ultimately will require admission for new onset A-fib         Procedure  Critical Care    Performed by: Dorothy Garcia MD  Authorized by: Dorothy Garcia MD    Critical care provider statement:     Critical care time  (minutes):  40    Critical care time was exclusive of:  Separately billable procedures and treating other patients    Critical care was necessary to treat or prevent imminent or life-threatening deterioration of the following conditions:  Metabolic crisis    Critical care was time spent personally by me on the following activities:  Blood draw for specimens, development of treatment plan with patient or surrogate, discussions with consultants, evaluation of patient's response to treatment, ordering and performing treatments and interventions, ordering and review of laboratory studies, ordering and review of radiographic studies, pulse oximetry, re-evaluation of patient's condition and review of old charts    Care discussed with: admitting provider         Dorothy Garcia MD  02/03/24 [id number]       Dorothy Garcia MD  02/04/24 3160

## 2024-02-04 NOTE — CONSULTS
"History Of Present Illness  Johnny Caicedo is a 63 y.o. male right handed admitted to [facility] on 2/3/24 presenting with worsening LE swelling. Neurology consulted for altered mental status.    Inpatient consult to Neurology  Consult performed by: Curtis Montenegro MD  Consult ordered by: Reagan Murphy DO      Listed history of COPD on 2 L supplemental oxygen.    Patient seen in the ER, admitted to the hospital, waiting for hospital bed.  Patient moved from [place] about a year ago now.  He lives with his brother here.  He was evaluated in the ER because of worsening short of breath, acutely worse today, as well as worsening bilateral lower extremity swelling.  He was worked up in the ER.  Lactate was elevated.  He was diagnosed with new onset A-fib and RVR.  He was somewhat tachypneic.  CT angiogram of the chest did not show pulmonary embolism. After he came back from CT angiogram, it was reported that patient seemed to be confused.  And so he was sent for head CT without contrast, which did not show any acute findings.  He was noted to have leukocytosis of 21.  Sodium 129.  Urine drug screen negative.  Urinalysis did not show any signs of UTI.  ESR/CRP was elevated.  Lipids were normal.  Patient was started on antibiotics and admitted to the hospital.  Neurology consulted for intermittent confusion.    Episodes of \"confusion\" happens only for a few seconds    When seen, patient was awake, alert, and able to give history.  He tells me that the episodes of confusion are new as of yesterday.  He has not had that before.  He denies any known history of stroke, seizure, or atrial fibrillation.    He also tells me that he had been checked for LAUREANO, has a machine, but he is not using it.    Former smoker, quit many years ago.  Denies alcohol use.  Denies street drug use.      Review of Systems   Constitutional:  Negative for appetite change, chills and fever.   HENT:  Negative for ear pain and nosebleeds.    Eyes:  Negative for " "discharge and itching.   Respiratory:  Negative for choking and chest tightness.    Cardiovascular:  Negative for chest pain and palpitations.   Gastrointestinal:  Negative for abdominal distention, abdominal pain and nausea.   Endocrine: Negative for cold intolerance and heat intolerance.   Genitourinary:  Negative for difficulty urinating and dysuria.   Musculoskeletal:  Negative for gait problem and myalgias.   Neurological:  Negative for dizziness, seizures and numbness.     Past Medical History  No past medical history on file.    Surgical History  No past surgical history on file.    Social History       Home Meds  (Not in a hospital admission)      Allergies  Patient has no known allergies.    Current Meds  Scheduled medications  fluticasone furoate-vilanteroL, 1 puff, inhalation, Daily  furosemide, 20 mg, intravenous, Daily  heparin (porcine), 7,500 Units, subcutaneous, q8h PRAVEENA  iohexol, 150 mL, intravenous, Once in imaging  piperacillin-tazobactam, 3.375 g, intravenous, q6h  vancomycin, 1,000 mg, intravenous, q12h      Continuous medications     PRN medications  PRN medications: acetaminophen, ipratropium-albuteroL, metoprolol, ondansetron, oxygen    Last Recorded Vitals  Blood pressure 111/61, pulse 90, temperature 36.8 °C (98.2 °F), temperature source Temporal, resp. rate 20, height 1.905 m (6' 3\"), weight 150 kg (330 lb), SpO2 99 %.    Awake, alert, oriented x3, on O2 via NC  Morbid obesity  Well-nourished, in bed  (+) BLE edema distal to mid shin    Mental status exam as above, conversant   Fair fund of knowledge  Recent/remote memory fair  Fair attention span  Knows president, knows year, knows month, knows season  Pupils round reactive to light, 3-4 mm, (-) RAPD   Fundoscopic examination was attempted but fundus was not visualized bilaterally   Full EOMs intact, no nystagmus, no ptosis   V1 to V3 sensation is intact   No facial droop   Hearing grossly intact   No dysarthria  Good shoulder shrug " bilaterally   Tongue is midline     Motor strength is 5/5 on all extremities, tone/bulk normal   Reflexes trace to absent on all 4 extremities (obese), downgoing toes bilaterally   Sensation is intact to light touch, pinprick, vibration on all 4 extremities   Finger to nose test intact bilaterally   I did not have him stand or walk    Relevant Results  I have personally reviewed the following:    Labs  Results for orders placed or performed during the hospital encounter of 02/03/24 (from the past 24 hour(s))   CBC and Auto Differential   Result Value Ref Range    WBC 21.0 (H) 4.4 - 11.3 x10*3/uL    nRBC 0.0 0.0 - 0.0 /100 WBCs    RBC 5.47 4.50 - 5.90 x10*6/uL    Hemoglobin 14.3 13.5 - 17.5 g/dL    Hematocrit 44.5 41.0 - 52.0 %    MCV 81 80 - 100 fL    MCH 26.1 26.0 - 34.0 pg    MCHC 32.1 32.0 - 36.0 g/dL    RDW 14.1 11.5 - 14.5 %    Platelets 256 150 - 450 x10*3/uL    Neutrophils % 90.8 40.0 - 80.0 %    Immature Granulocytes %, Automated 1.1 (H) 0.0 - 0.9 %    Lymphocytes % 4.0 13.0 - 44.0 %    Monocytes % 3.6 2.0 - 10.0 %    Eosinophils % 0.1 0.0 - 6.0 %    Basophils % 0.4 0.0 - 2.0 %    Neutrophils Absolute 19.04 (H) 1.20 - 7.70 x10*3/uL    Immature Granulocytes Absolute, Automated 0.24 0.00 - 0.70 x10*3/uL    Lymphocytes Absolute 0.85 (L) 1.20 - 4.80 x10*3/uL    Monocytes Absolute 0.76 0.10 - 1.00 x10*3/uL    Eosinophils Absolute 0.02 0.00 - 0.70 x10*3/uL    Basophils Absolute 0.08 0.00 - 0.10 x10*3/uL   Magnesium   Result Value Ref Range    Magnesium 1.33 (L) 1.60 - 2.40 mg/dL   Comprehensive metabolic panel   Result Value Ref Range    Glucose 103 (H) 74 - 99 mg/dL    Sodium 129 (L) 136 - 145 mmol/L    Potassium 4.6 3.5 - 5.3 mmol/L    Chloride 97 (L) 98 - 107 mmol/L    Bicarbonate 21 21 - 32 mmol/L    Anion Gap 16 10 - 20 mmol/L    Urea Nitrogen 11 6 - 23 mg/dL    Creatinine 0.93 0.50 - 1.30 mg/dL    eGFR >90 >60 mL/min/1.73m*2    Calcium 9.3 8.6 - 10.3 mg/dL    Albumin 3.8 3.4 - 5.0 g/dL    Alkaline  Phosphatase 53 33 - 136 U/L    Total Protein 8.1 6.4 - 8.2 g/dL    AST 16 9 - 39 U/L    Bilirubin, Total 0.6 0.0 - 1.2 mg/dL    ALT 18 10 - 52 U/L   Lipase   Result Value Ref Range    Lipase 9 9 - 82 U/L   Troponin I, High Sensitivity   Result Value Ref Range    Troponin I, High Sensitivity 11 0 - 20 ng/L   B-Type Natriuretic Peptide   Result Value Ref Range     (H) 0 - 99 pg/mL   D-Dimer, Quantitative Non VTE   Result Value Ref Range    D-Dimer Non VTE, Quant (ng/mL FEU) 857 (H) <=500 ng/mL FEU   Blood Gas Venous Full Panel   Result Value Ref Range    POCT pH, Venous 7.45 (H) 7.33 - 7.43 pH    POCT pCO2, Venous 37 (L) 41 - 51 mm Hg    POCT pO2, Venous 47 (H) 35 - 45 mm Hg    POCT SO2, Venous 77 (H) 45 - 75 %    POCT Oxy Hemoglobin, Venous 75.0 45.0 - 75.0 %    POCT Hematocrit Calculated, Venous 44.0 41.0 - 52.0 %    POCT Sodium, Venous 128 (L) 136 - 145 mmol/L    POCT Potassium, Venous 4.8 3.5 - 5.3 mmol/L    POCT Chloride, Venous 98 98 - 107 mmol/L    POCT Ionized Calicum, Venous 1.16 1.10 - 1.33 mmol/L    POCT Glucose, Venous 114 (H) 74 - 99 mg/dL    POCT Lactate, Venous 2.5 (H) 0.4 - 2.0 mmol/L    POCT Base Excess, Venous 1.9 -2.0 - 3.0 mmol/L    POCT HCO3 Calculated, Venous 25.7 22.0 - 26.0 mmol/L    POCT Hemoglobin, Venous 14.6 13.5 - 17.5 g/dL    POCT Anion Gap, Venous 9.0 (L) 10.0 - 25.0 mmol/L    Patient Temperature 37.0 degrees Celsius    FiO2 24 %   Influenza A, and B PCR   Result Value Ref Range    Flu A Result Not Detected Not Detected    Flu B Result Not Detected Not Detected   SARS-CoV-2 RT PCR   Result Value Ref Range    Coronavirus 2019, PCR Not Detected Not Detected   MRSA Surveillance for Vancomycin De-escalation, PCR    Specimen: Anterior Nares; Swab   Result Value Ref Range    MRSA PCR Not Detected Not Detected   Blood Gas Lactic Acid, Venous   Result Value Ref Range    POCT Lactate, Venous 1.6 0.4 - 2.0 mmol/L   Troponin I, High Sensitivity   Result Value Ref Range    Troponin I, High  Sensitivity 12 0 - 20 ng/L   Urinalysis with Reflex Culture and Microscopic   Result Value Ref Range    Color, Urine Yellow Straw, Yellow    Appearance, Urine Clear Clear    Specific Gravity, Urine 1.038 (N) 1.005 - 1.035    pH, Urine 7.0 5.0, 5.5, 6.0, 6.5, 7.0, 7.5, 8.0    Protein, Urine NEGATIVE NEGATIVE mg/dL    Glucose, Urine NEGATIVE NEGATIVE mg/dL    Blood, Urine MODERATE (2+) (A) NEGATIVE    Ketones, Urine NEGATIVE NEGATIVE mg/dL    Bilirubin, Urine NEGATIVE NEGATIVE    Urobilinogen, Urine <2.0 <2.0 mg/dL    Nitrite, Urine NEGATIVE NEGATIVE    Leukocyte Esterase, Urine NEGATIVE NEGATIVE   Extra Urine Gray Tube   Result Value Ref Range    Extra Tube 293    Urinalysis Microscopic   Result Value Ref Range    WBC, Urine NONE 1-5, NONE /HPF    RBC, Urine >20 (A) NONE, 1-2, 3-5 /HPF    Mucus, Urine 1+ Reference range not established. /LPF   Drug Screen, Urine   Result Value Ref Range    Amphetamine Screen, Urine Presumptive Negative Presumptive Negative    Barbiturate Screen, Urine Presumptive Negative Presumptive Negative    Benzodiazepines Screen, Urine Presumptive Negative Presumptive Negative    Cannabinoid Screen, Urine Presumptive Negative Presumptive Negative    Cocaine Metabolite Screen, Urine Presumptive Negative Presumptive Negative    Fentanyl Screen, Urine Presumptive Negative Presumptive Negative    Opiate Screen, Urine Presumptive Negative Presumptive Negative    Oxycodone Screen, Urine Presumptive Negative Presumptive Negative    PCP Screen, Urine Presumptive Negative Presumptive Negative   Sodium, Urine Random   Result Value Ref Range    Sodium, Urine Random 101 mmol/L    Creatinine, Urine Random 124.3 20.0 - 370.0 mg/dL    Sodium/Creatinine Ratio 81 Not established. mmol/g Creat   Blood Gas Venous Full Panel   Result Value Ref Range    POCT pH, Venous 7.47 (H) 7.33 - 7.43 pH    POCT pCO2, Venous 36 (L) 41 - 51 mm Hg    POCT pO2, Venous 73 (H) 35 - 45 mm Hg    POCT SO2, Venous 96 (H) 45 - 75 %     POCT Oxy Hemoglobin, Venous 94.2 (H) 45.0 - 75.0 %    POCT Hematocrit Calculated, Venous 40.0 (L) 41.0 - 52.0 %    POCT Sodium, Venous 126 (L) 136 - 145 mmol/L    POCT Potassium, Venous 4.3 3.5 - 5.3 mmol/L    POCT Chloride, Venous 98 98 - 107 mmol/L    POCT Ionized Calicum, Venous 1.13 1.10 - 1.33 mmol/L    POCT Glucose, Venous 111 (H) 74 - 99 mg/dL    POCT Lactate, Venous 1.0 0.4 - 2.0 mmol/L    POCT Base Excess, Venous 2.7 -2.0 - 3.0 mmol/L    POCT HCO3 Calculated, Venous 26.2 (H) 22.0 - 26.0 mmol/L    POCT Hemoglobin, Venous 13.3 (L) 13.5 - 17.5 g/dL    POCT Anion Gap, Venous 6.0 (L) 10.0 - 25.0 mmol/L    Patient Temperature 37.0 degrees Celsius    FiO2 24 %   Acute Toxicology Panel, Blood   Result Value Ref Range    Acetaminophen <10.0 10.0 - 30.0 ug/mL    Salicylate  <3 4 - 20 mg/dL    Alcohol <10 <=10 mg/dL   Troponin I, High Sensitivity   Result Value Ref Range    Troponin I, High Sensitivity 15 0 - 20 ng/L   MRSA Surveillance for Vancomycin De-escalation, PCR    Specimen: Anterior Nares; Swab   Result Value Ref Range    MRSA PCR Not Detected Not Detected   CBC   Result Value Ref Range    WBC 16.1 (H) 4.4 - 11.3 x10*3/uL    nRBC 0.0 0.0 - 0.0 /100 WBCs    RBC 4.84 4.50 - 5.90 x10*6/uL    Hemoglobin 12.6 (L) 13.5 - 17.5 g/dL    Hematocrit 39.9 (L) 41.0 - 52.0 %    MCV 82 80 - 100 fL    MCH 26.0 26.0 - 34.0 pg    MCHC 31.6 (L) 32.0 - 36.0 g/dL    RDW 14.2 11.5 - 14.5 %    Platelets 213 150 - 450 x10*3/uL   Renal Function Panel   Result Value Ref Range    Glucose 95 74 - 99 mg/dL    Sodium 129 (L) 136 - 145 mmol/L    Potassium 4.0 3.5 - 5.3 mmol/L    Chloride 97 (L) 98 - 107 mmol/L    Bicarbonate 25 21 - 32 mmol/L    Anion Gap 11 10 - 20 mmol/L    Urea Nitrogen 12 6 - 23 mg/dL    Creatinine 1.01 0.50 - 1.30 mg/dL    eGFR 83 >60 mL/min/1.73m*2    Calcium 8.3 (L) 8.6 - 10.3 mg/dL    Phosphorus 4.5 2.5 - 4.9 mg/dL    Albumin 3.1 (L) 3.4 - 5.0 g/dL   Magnesium   Result Value Ref Range    Magnesium 1.75 1.60 - 2.40  mg/dL   Lactate   Result Value Ref Range    Lactate 1.1 0.4 - 2.0 mmol/L   Sedimentation Rate   Result Value Ref Range    Sedimentation Rate 93 (H) 0 - 20 mm/h   C-reactive protein   Result Value Ref Range    C-Reactive Protein 14.81 (H) <1.00 mg/dL   Lipid Panel   Result Value Ref Range    Cholesterol 86 0 - 199 mg/dL    HDL-Cholesterol 28.8 mg/dL    Cholesterol/HDL Ratio 3.0     LDL Calculated 45 <=99 mg/dL    VLDL 12 0 - 40 mg/dL    Triglycerides 60 0 - 149 mg/dL    Non HDL Cholesterol 57 0 - 149 mg/dL   TSH with reflex to Free T4 if abnormal   Result Value Ref Range    Thyroid Stimulating Hormone 1.12 0.44 - 3.98 mIU/L   Vitamin B12   Result Value Ref Range    Vitamin B12 211 211 - 911 pg/mL       Imaging results  No MRI head results found for the past 12 months   CT head wo IV contrast    Result Date: 2/4/2024  Interpreted By:  Valerie Melgar, STUDY: CT HEAD WO IV CONTRAST;  2/4/2024 12:48 am   INDICATION: Signs/Symptoms:ams.   COMPARISON: None.   ACCESSION NUMBER(S): [id number]   ORDERING CLINICIAN: COLUMBA LUIS   TECHNIQUE: Axial noncontrast CT images of the head.   FINDINGS: BRAIN PARENCHYMA: Mild non-specific white matter changes and parenchymal volume loss present. Focus of encephalomalacia along the inferior surface of the left frontal lobe which may be related to remote trauma. No mass effect or midline shift.   HEMORRHAGE: No acute intracranial hemorrhage. VENTRICLES and EXTRA-AXIAL SPACES: The ventricles, sulci and basal cisterns enlarged, concordant with parenchymal volume loss. EXTRACRANIAL SOFT TISSUES: Within normal limits. PARANASAL SINUSES/MASTOIDS: Scattered possible occasion of the ethmoid air cells and frontal sinuses. Mastoid air cells are patent. CALVARIUM: No depressed skull fracture. No destructive osseous lesion.   OTHER FINDINGS: None.       No acute intracranial hemorrhage or mass effect.   Left frontal focus of encephalomalacia and mild nonspecific white matter changes  present.   MACRO: None.   Signed by: Valerie Melgar 2/4/2024 2:05 AM Dictation workstation:   [id number]      Assessment/Plan  I tried to call brother who is listed as contact in the chart for more information, but unable to reach.    1.  Confusion, intermittent  Patient tells me this is new as of yesterday  Ddx: ? Afib causing encephalopathy,  ? Possible stroke related to afib causing encephalopathy, infection (? where), hyponatremia, vs other  I added B12 level--came back low  CT head reports focal area of encephalomalacia in L inferior frontal lobe (I don't quite see that area)  Patient has no known history of stroke or seizure  Very low clinical suspicion for CNS infection or seizure    2.  Atrial fibrillation in RVR, new onset    3.  Hyponatremia    4.  B12 deficiency    5.  BLE edema      Recommendations:  1.  Do MRI brain without contrast if able  2.  B12 supplement  3.  Optimize medical status as able  4.  Antibiotics as per primary team  5.  Once factors above are more or less controlled and if episodes continue, consider EEG    Discussed with patient.    All questions answered. Please call with questions.    There will not be inpatient neurology coverage at [facility] 2/5/24-2/11/24. If needed, please contact transfer center.      Curtis Montenegro MD  2/4/2024  8:58 AM

## 2024-02-04 NOTE — PROGRESS NOTES
This progress note represents a emergency department transition note for signout of care.    Patient care was signed out to me. Please see the previous provider's notes for the full history and physical. Briefly, Johnny Caicedo is 63 y.o. male who With history of COPD on 2 L at baseline, hyperlipidemia who presents to the emergency department with multiple complaints.  Initially, patient was having peripheral leg swelling.  Prior provider noted that there appeared to be unilateral swelling on the right leg.  Therefore a pulmonary embolism workup was initiated.  However, patient suddenly became tachycardic and was found to be in A-fib RVR.  Initial CT PE study was negative.  Patient then began to complain of back pain and had intermittent episodes of confusion.  These episodes lasted a few minutes.  Patient did not know the year.  Brother who is at bedside agreed that the patient appeared confused.  Patient then became back to baseline.  Patient had been broadly covered with antibiotics.  Patient was signed out pending CT imaging and admission.  Patient CT imaging results showed that there was left frontal focus of encephalomalacia and mild nonspecific white matter changes.  The patient also had fluid contents of the colon which could be infectious in addition to interstitial opacities of the lung parenchyma.  Heart rate remained controlled.  Patient remained at 2 L.  Patient agreeable with hospitalization when I spoke to him.  No obvious focal neurologic deficits.  Case discussed with [facility] who agreed that the patient admitted to stepdown.          Alejo Montiel DO  Emergency Medicine    ED Course as of 02/04/24 0119   Sat Feb 03, 2024 2101 POCT Lactate, Venous(!): 2.5 [CF]   2223 IMPRESSION:  Narrowing of the trachea is suggested.  Stricture or sequela are COPD  are considerations.  There could also be pathologic thickening of the  trachea.  Consider chest CT for additional evaluation.  No consolidation, pleural  effusion, or pulmonary edema.   [CF]   2250 IMPRESSION:  No pulmonary artery emboli.  No acute cardiopulmonary disease.   [CF]   2250 Temperature: 37.8 °C (100.1 °F) [CF]   2250 Heart Rate(!): 135 [CF]   2250 Respirations(!): 28 [CF]   Sun Feb 04, 2024 0107 EKG at 828 February 3 shows atrial fibrillation with rapid RVR at a rate of 165 bpm, no ST elevation seen normal intervals otherwise.  No EKG to compare [CF]   0108 Atrial fibrillation at a rate 133 bpm no ST changes or elevations nonischemic EKG.  Normal intervals otherwise. [CF]      ED Course User Index  [CF] Dorothy Garcia MD         Diagnoses as of 02/04/24 0119   Hypokalemia   Hypomagnesemia   DEE (acute kidney injury) (CMS/HCC)   Bilious vomiting without nausea

## 2024-02-04 NOTE — PROGRESS NOTES
Johnny Caicedo is a 63 y.o. male admitted for Hypokalemia. Pharmacy reviewed the patient's [id number] medications and allergies for accuracy.    The list below reflects the PTA list prior to pharmacy medication history. A summary a changes to the PTA medication list has been listed below. Please review each medication in order reconciliation for additional clarification and justification.    Medications added:  Atorvastatin 40mg every day   Venlafaxine er 37.5mg every day   Spironolactone 25mg every day   Furosemide 20mg every day   Breo 200/25mcg 1p every day   Albuterol HFA 90mcg 2p q6 prn  Montelukast 10mg every day     Medications modified:    Medications to be removed:    Medications of concern:      None       Anupama Sosa CPhT

## 2024-02-04 NOTE — CONSULTS
Inpatient consult to Cardiology  Consult performed by: Gerry Laird MD  Consult ordered by: Reagan Murphy DO        History Of Present Illness:    Johnny Caicedo is a 63 y.o. male with a past medical history significant for HTN, HLD, COPD, chronic hypoxic respiratory failure on 2 L via NC, morbid obesity, prior tobacco use history having quit about 20 years ago, anxiety, depression and GERD.  The below information was obtained from the very limited records in the EMR.  Patient states that he receives the majority of his care in [place], [place].  He states that he does remember that he does take Breo, as needed albuterol, Lasix, a blood pressure medication as well as venlafaxine.  Patient was brought in by family for increasing bilateral lower extremity swelling, shortness of breath, generalized weakness and confusion.  Patient's family had noted that he had become very sporadically confused with these episodes only lasting about a few seconds.  He did have an episode while in the ED that lasted but a few minutes where at first he was completely not alert and oriented and did not know where he was but then promptly after returning from the CT scanner he was alert and oriented x 4 and appropriately interactive.  The patient states that he has felt generally unwell for about 2 days but noted sudden onset shortness of breath and just prior to presenting to the ED for further evaluation and management.  He denies any recent sick contacts, chemical/environmental exposures, changes in dietary habits or any recent traumatic events/falls other than noted above.  He denies any fevers, chills, night sweats, vision changes, auditory changes, change in taste/smell, loss of bowel/bladder control, loss consciousness, dizziness, vertigo, syncope, seizure-like activity, chest pain, palpitations, coughing, wheezing, congestion, hemoptysis, hematemesis, abdominal pain, nausea, vomiting, diarrhea, constipation, dysuria, hematuria,  "dyschezia, hematochezia any lateralizing motor/sensory deficits other than noted above.        Last Recorded Vitals:  Vitals:    02/04/24 0336 02/04/24 0444 02/04/24 0515 02/04/24 0619   BP: 128/56  103/65 111/61   Pulse: (!) 105  (!) 101 90   Resp: (!) 24 (!) 28 (!) 25 20   Temp:   36.9 °C (98.4 °F) 36.8 °C (98.2 °F)   TempSrc:   Temporal Temporal   SpO2: 95% 96% 99% 99%   Weight:       Height:           Last Labs:  CBC - 2/4/2024:  5:35 AM  16.1 12.6 213    39.9      CMP - 2/4/2024:  5:35 AM  8.3 8.1 16 --- 0.6   4.5 3.1 18 53      PTT - No results in last year.  _   _ _     Troponin I, High Sensitivity   Date/Time Value Ref Range Status   02/04/2024 01:55 AM 15 0 - 20 ng/L Final   02/03/2024 10:57 PM 12 0 - 20 ng/L Final   02/03/2024 08:40 PM 11 0 - 20 ng/L Final     BNP   Date/Time Value Ref Range Status   02/03/2024 08:40  (H) 0 - 99 pg/mL Final     LDL Calculated   Date/Time Value Ref Range Status   02/04/2024 05:35 AM 45 <=99 mg/dL Final     Comment:                                 Near   Borderline      AGE      Desirable  Optimal    High     High     Very High     0-19 Y     0 - 109     ---    110-129   >/= 130     ----    20-24 Y     0 - 119     ---    120-159   >/= 160     ----      >24 Y     0 -  99   100-129  130-159   160-189     >/=190       VLDL   Date/Time Value Ref Range Status   02/04/2024 05:35 AM 12 0 - 40 mg/dL Final      Last I/O:  I/O last 3 completed shifts:  In: 300 (2 mL/kg) [IV Piggyback:300]  Out: - (0 mL/kg)   Weight: 149.7 kg     Past Cardiology Tests (Last 3 Years):  EKG:  No results found for this or any previous visit from the past 1095 days.    Echo:  No results found for this or any previous visit from the past 1095 days.    Ejection Fractions:  No results found for: \"EF\"  Cath:  No results found for this or any previous visit from the past 1095 days.    Stress Test:  No results found for this or any previous visit from the past 1095 days.    Cardiac Imaging:  No results " found for this or any previous visit from the past 1095 days.      Past Medical History:  He has no past medical history on file.    Past Surgical History:  He has no past surgical history on file.      Social History:  He has no history on file for tobacco use, alcohol use, and drug use.    Family History:  No family history on file.     Allergies:  Patient has no known allergies.    Inpatient Medications:  Scheduled medications   Medication Dose Route Frequency    amiodarone  150 mg intravenous Once    digoxin  500 mcg intravenous Once    Followed by    digoxin  250 mcg intravenous Once    Followed by    [START ON 2/5/2024] digoxin  125 mcg intravenous Daily    fluticasone furoate-vilanteroL  1 puff inhalation Daily    [START ON 2/5/2024] furosemide  40 mg intravenous Daily    heparin  10,000 Units intravenous Once    iohexol  150 mL intravenous Once in imaging    piperacillin-tazobactam  3.375 g intravenous q6h    vancomycin  1,000 mg intravenous q12h     PRN medications   Medication    acetaminophen    heparin    ipratropium-albuteroL    metoprolol    ondansetron    oxygen     Continuous Medications   Medication Dose Last Rate    amiodarone  0.5-1 mg/min      heparin  0-4,500 Units/hr       Outpatient Medications:  No current outpatient medications    Physical Exam:  Constitutional:       General: He is not in acute distress.     Appearance: He is obese. He is not ill-appearing or diaphoretic.   HENT:      Head: Normocephalic and atraumatic.      Mouth/Throat:      Mouth: Mucous membranes are moist.      Pharynx: Oropharynx is clear.   Eyes:      Extraocular Movements: Extraocular movements intact.      Conjunctiva/sclera: Conjunctivae normal.      Pupils: Pupils are equal, round, and reactive to light.   Neck:      Vascular: No carotid bruit.      Comments: No grossly evident JVD but difficult to assess given neck adiposity   Cardiovascular:      Rate and Rhythm: Tachycardia present. Rhythm irregular.       Pulses: Normal pulses.      Heart sounds: Murmur (heard best at the right upper sternal border) heard.   Pulmonary:      Effort: Pulmonary effort is normal. No respiratory distress.      Breath sounds: No wheezing or rhonchi.      Comments: Nonlabored on 2L via NC, diminished breath sounds throughout all lung fields with very faint crackles in the bases,   Chest:      Chest wall: No tenderness.   Abdominal:      Comments: Obese abdomen, unable to fully appreciate for masses/organomegaly due to body habitus, nontender, no guarding, no rebound tenderness, soft, bowel sounds present throughout   Musculoskeletal:      Cervical back: Normal range of motion and neck supple. No rigidity or tenderness.      Comments: Range of motion/strength appears globally diminished at likely a 4 out of 5 in the upper extremity but slightly weaker in the lower extremities and limited by edema and body habitus, he does have notable 2+ pitting edema to just below the knees bilaterally, I did not appreciate any worse swelling on 1 side versus the other in comparison to the ED documentation.   Lymphadenopathy:      Cervical: No cervical adenopathy.   Skin:     General: Skin is warm and dry.      Findings: No bruising, erythema, lesion or rash.   Neurological:      General: No focal deficit present.      Mental Status: He is alert and oriented to person, place, and time.      Comments: Decreased sensation to light touch of bilateral feet, finger-to-nose appears grossly intact, he did attempt to perform heel-to-shin but he stated that it was somewhat difficult because he felt like his lower extremities felt heavy, fluent speech but somewhat slow to response, he does appropriately interact, he is alert and oriented x 4   Psychiatric:         Mood and Affect: Mood normal.         Behavior: Behavior normal.         Thought Content: Thought content normal.         Judgment: Judgment normal.            Assessment/Plan   1) Atrial Fib with  RVR  Start IV Amio with relative hypotension  Dig loading  IV Heparin  Echo    2) Acute diastolic heart failure  IV lasix  Echo  Peripheral IV 02/03/24 20 G Left Antecubital (Active)   Site Assessment Clean;Intact;Dry 02/03/24 2039   Dressing Type Transparent 02/03/24 2039   Line Status Blood return noted;Flushed 02/03/24 2039   Dressing Status Clean;Dry 02/03/24 2039   Number of days: 1       Peripheral IV 02/03/24 20 G Right;Anterior Forearm (Active)   Site Assessment Clean;Dry;Intact 02/03/24 2108   Dressing Type Transparent 02/03/24 2108   Line Status Blood return noted;Flushed 02/03/24 2108   Dressing Status Clean;Dry 02/03/24 2108   Number of days: 1       Code Status:  Full Code    I spent 30 minutes in the professional and overall care of this patient.        Gerry Laird MD

## 2024-02-04 NOTE — H&P
History Of Present Illness  Johnny Caicedo is a 63 y.o.  male with a past medical history significant for HTN, HLD, COPD, chronic hypoxic respiratory failure on 2 L via NC, morbid obesity, prior tobacco use history having quit about 20 years ago, anxiety, depression and GERD.  The below information was obtained from the very limited records in the EMR.  Patient states that he receives the majority of his care in [place], [place].  He states that he does remember that he does take Breo, as needed albuterol, Lasix, a blood pressure medication as well as venlafaxine.  Patient was brought in by family for increasing bilateral lower extremity swelling, shortness of breath, generalized weakness and confusion.  Patient's family had noted that he had become very sporadically confused with these episodes only lasting about a few seconds.  He did have an episode while in the ED that lasted but a few minutes where at first he was completely not alert and oriented and did not know where he was but then promptly after returning from the CT scanner he was alert and oriented x 4 and appropriately interactive.  The patient states that he has felt generally unwell for about 2 days but noted sudden onset shortness of breath and just prior to presenting to the ED for further evaluation and management.  He denies any recent sick contacts, chemical/environmental exposures, changes in dietary habits or any recent traumatic events/falls other than noted above.  He denies any fevers, chills, night sweats, vision changes, auditory changes, change in taste/smell, loss of bowel/bladder control, loss consciousness, dizziness, vertigo, syncope, seizure-like activity, chest pain, palpitations, coughing, wheezing, congestion, hemoptysis, hematemesis, abdominal pain, nausea, vomiting, diarrhea, constipation, dysuria, hematuria, dyschezia, hematochezia any lateralizing motor/sensory deficits other than noted above.    ED course:  1.  Vital  signs on presentation: Temperature 36.8 °C, heart rate of 82, respirations 24, blood pressure 143/72, pulse ox of 94% initially on 4 L by nasal cannula but was down titrated back to his home O2 demands of 2 L via nasal cannula  2.  Promptly after arrival to the ED he did suddenly become tachycardic and an EKG noted atrial fibrillation with rapid ventricular response with rates as high as 160s  3.  Repeat EKG noted atrial fibrillation with minimal ST segment elevations in the inferior leads, rate of 133, , QRS 92, QTc of 429  4.  Given the patient's labs and unclear presentation or oral follow-up symptomology he was treated for multiple aspects with infection being the running diagnosis per the ED physician.  Patient was initiated on IV antibiotics with vancomycin/Zosyn/azithromycin.  He was also given a total of 7-1/2 mg of IV Lopressor with notable improvement in his rates to the 110s to 120s.  He was also given 2 g of IV magnesium, Toradol.  5.  WBC of 21  6.  Hemoglobin of 14.3  7.  Glucose of 103  8.  Sodium 129  9.  BUNs/creatinine of 11/0.93  10.  Magnesium of 1.33  11.  BNP of 207  12.  D-dimer of 857  13.  Influenza A/B PCR negative, COVID-19 PCR negative  14.  Blood cultures x 2 pending  15.  VBG: pH is 7.45, pCO2 37, pO2 47, SO2 of 77, [place] bicarb of 25.7  16.  XR chest: Narrowing of the trachea suggested stricture/sequela or COPD considerations,  17.  CTA chest: Noted no acute pulmonary emboli or acute cardiopulmonary processes  18.  Lactate of 2 CT head: .5 with a repeat of 1.6  19.  Noted no acute intracranial hemorrhage or mass effect but there is a left frontal focus of encephalomalacia and mild nonspecific white matter changes  20.  CTA chest/abdomen/pelvis: No aortic aneurysm or dissection, fluid contents within the colon suggestive of infectious versus inflammatory changes, mild mediastinal/retroperitoneal/periportal/inguinal lymphadenopathy possibly reactive, interstitial opacities in  the lung parenchyma which may be related to fibrosis/scarring or possible mildly superimposed interstitial edema not entirely excluded, minimal L5-S1 anterolisthesis with chronic appearing pars defects, hepatic steatosis, coronary artery calcifications and additional findings as noted below in the full report.  21.  Troponin level of 11 then 10 then 15  22.  UA was unremarkable for any infectious etiologies  23.  Urine drug screen was unremarkable  24.  Acetaminophen/salicylate/alcohol were within range     A 12 point ROS was performed with the patient denying any complaints at this time aside from those listed in the HPI above.    Past Medical History  He has no past medical history on file.    Surgical History  He has no past surgical history on file.     Social History  He has no history on file for tobacco use, alcohol use, and drug use.    Family History  No family history on file.     Allergies  Patient has no known allergies.    Review of Systems   Constitutional:  Positive for activity change and fatigue. Negative for appetite change, chills, diaphoresis and fever.   HENT:  Negative for congestion, postnasal drip, rhinorrhea, sinus pressure, sinus pain, sneezing and sore throat.    Respiratory:  Positive for shortness of breath. Negative for cough, chest tightness and wheezing.    Cardiovascular:  Negative for chest pain, palpitations and leg swelling.   Gastrointestinal:  Negative for abdominal distention, abdominal pain, blood in stool, constipation, diarrhea, nausea and vomiting.   Genitourinary:  Negative for decreased urine volume, difficulty urinating, dysuria, flank pain, frequency and urgency.   Neurological:  Positive for weakness. Negative for dizziness, tremors, seizures, syncope, facial asymmetry, speech difficulty, light-headedness, numbness and headaches.   Psychiatric/Behavioral:  Positive for confusion and decreased concentration. The patient is not nervous/anxious.    All other systems  reviewed and are negative.       Physical Exam  Constitutional:       General: He is not in acute distress.     Appearance: He is obese. He is not ill-appearing or diaphoretic.   HENT:      Head: Normocephalic and atraumatic.      Mouth/Throat:      Mouth: Mucous membranes are moist.      Pharynx: Oropharynx is clear.   Eyes:      Extraocular Movements: Extraocular movements intact.      Conjunctiva/sclera: Conjunctivae normal.      Pupils: Pupils are equal, round, and reactive to light.   Neck:      Vascular: No carotid bruit.      Comments: No grossly evident JVD but difficult to assess given neck adiposity   Cardiovascular:      Rate and Rhythm: Tachycardia present. Rhythm irregular.      Pulses: Normal pulses.      Heart sounds: Murmur (heard best at the right upper sternal border) heard.   Pulmonary:      Effort: Pulmonary effort is normal. No respiratory distress.      Breath sounds: No wheezing or rhonchi.      Comments: Nonlabored on 2L via NC, diminished breath sounds throughout all lung fields with very faint crackles in the bases,   Chest:      Chest wall: No tenderness.   Abdominal:      Comments: Obese abdomen, unable to fully appreciate for masses/organomegaly due to body habitus, nontender, no guarding, no rebound tenderness, soft, bowel sounds present throughout   Musculoskeletal:      Cervical back: Normal range of motion and neck supple. No rigidity or tenderness.      Comments: Range of motion/strength appears globally diminished at likely a 4 out of 5 in the upper extremity but slightly weaker in the lower extremities and limited by edema and body habitus, he does have notable 2+ pitting edema to just below the knees bilaterally, I did not appreciate any worse swelling on 1 side versus the other in comparison to the ED documentation.   Lymphadenopathy:      Cervical: No cervical adenopathy.   Skin:     General: Skin is warm and dry.      Findings: No bruising, erythema, lesion or rash.  "  Neurological:      General: No focal deficit present.      Mental Status: He is alert and oriented to person, place, and time.      Comments: Decreased sensation to light touch of bilateral feet, finger-to-nose appears grossly intact, he did attempt to perform heel-to-shin but he stated that it was somewhat difficult because he felt like his lower extremities felt heavy, fluent speech but somewhat slow to response, he does appropriately interact, he is alert and oriented x 4   Psychiatric:         Mood and Affect: Mood normal.         Behavior: Behavior normal.         Thought Content: Thought content normal.         Judgment: Judgment normal.         SCHEDULED MEDICATIONS  heparin (porcine), 7,500 Units, subcutaneous, q8h PRAVEENA  iohexol, 150 mL, intravenous, Once in imaging  piperacillin-tazobactam, 3.375 g, intravenous, q6h  vancomycin, 1,000 mg, intravenous, q12h           CONTINUOUS MEDICATIONS          PRN MEDICATIONS  PRN medications: acetaminophen, ipratropium-albuteroL, metoprolol, ondansetron, oxygen      Last Recorded Vitals  Blood pressure 103/65, pulse (!) 101, temperature 36.9 °C (98.4 °F), temperature source Temporal, resp. rate (!) 25, height 1.905 m (6' 3\"), weight 150 kg (330 lb), SpO2 99 %.    Relevant Results    Results for orders placed or performed during the hospital encounter of 02/03/24 (from the past 24 hour(s))   CBC and Auto Differential   Result Value Ref Range    WBC 21.0 (H) 4.4 - 11.3 x10*3/uL    nRBC 0.0 0.0 - 0.0 /100 WBCs    RBC 5.47 4.50 - 5.90 x10*6/uL    Hemoglobin 14.3 13.5 - 17.5 g/dL    Hematocrit 44.5 41.0 - 52.0 %    MCV 81 80 - 100 fL    MCH 26.1 26.0 - 34.0 pg    MCHC 32.1 32.0 - 36.0 g/dL    RDW 14.1 11.5 - 14.5 %    Platelets 256 150 - 450 x10*3/uL    Neutrophils % 90.8 40.0 - 80.0 %    Immature Granulocytes %, Automated 1.1 (H) 0.0 - 0.9 %    Lymphocytes % 4.0 13.0 - 44.0 %    Monocytes % 3.6 2.0 - 10.0 %    Eosinophils % 0.1 0.0 - 6.0 %    Basophils % 0.4 0.0 - 2.0 % "    Neutrophils Absolute 19.04 (H) 1.20 - 7.70 x10*3/uL    Immature Granulocytes Absolute, Automated 0.24 0.00 - 0.70 x10*3/uL    Lymphocytes Absolute 0.85 (L) 1.20 - 4.80 x10*3/uL    Monocytes Absolute 0.76 0.10 - 1.00 x10*3/uL    Eosinophils Absolute 0.02 0.00 - 0.70 x10*3/uL    Basophils Absolute 0.08 0.00 - 0.10 x10*3/uL   Magnesium   Result Value Ref Range    Magnesium 1.33 (L) 1.60 - 2.40 mg/dL   Comprehensive metabolic panel   Result Value Ref Range    Glucose 103 (H) 74 - 99 mg/dL    Sodium 129 (L) 136 - 145 mmol/L    Potassium 4.6 3.5 - 5.3 mmol/L    Chloride 97 (L) 98 - 107 mmol/L    Bicarbonate 21 21 - 32 mmol/L    Anion Gap 16 10 - 20 mmol/L    Urea Nitrogen 11 6 - 23 mg/dL    Creatinine 0.93 0.50 - 1.30 mg/dL    eGFR >90 >60 mL/min/1.73m*2    Calcium 9.3 8.6 - 10.3 mg/dL    Albumin 3.8 3.4 - 5.0 g/dL    Alkaline Phosphatase 53 33 - 136 U/L    Total Protein 8.1 6.4 - 8.2 g/dL    AST 16 9 - 39 U/L    Bilirubin, Total 0.6 0.0 - 1.2 mg/dL    ALT 18 10 - 52 U/L   Lipase   Result Value Ref Range    Lipase 9 9 - 82 U/L   Troponin I, High Sensitivity   Result Value Ref Range    Troponin I, High Sensitivity 11 0 - 20 ng/L   B-Type Natriuretic Peptide   Result Value Ref Range     (H) 0 - 99 pg/mL   D-Dimer, Quantitative Non VTE   Result Value Ref Range    D-Dimer Non VTE, Quant (ng/mL FEU) 857 (H) <=500 ng/mL FEU   Blood Gas Venous Full Panel   Result Value Ref Range    POCT pH, Venous 7.45 (H) 7.33 - 7.43 pH    POCT pCO2, Venous 37 (L) 41 - 51 mm Hg    POCT pO2, Venous 47 (H) 35 - 45 mm Hg    POCT SO2, Venous 77 (H) 45 - 75 %    POCT Oxy Hemoglobin, Venous 75.0 45.0 - 75.0 %    POCT Hematocrit Calculated, Venous 44.0 41.0 - 52.0 %    POCT Sodium, Venous 128 (L) 136 - 145 mmol/L    POCT Potassium, Venous 4.8 3.5 - 5.3 mmol/L    POCT Chloride, Venous 98 98 - 107 mmol/L    POCT Ionized Calicum, Venous 1.16 1.10 - 1.33 mmol/L    POCT Glucose, Venous 114 (H) 74 - 99 mg/dL    POCT Lactate, Venous 2.5 (H) 0.4 -  2.0 mmol/L    POCT Base Excess, Venous 1.9 -2.0 - 3.0 mmol/L    POCT HCO3 Calculated, Venous 25.7 22.0 - 26.0 mmol/L    POCT Hemoglobin, Venous 14.6 13.5 - 17.5 g/dL    POCT Anion Gap, Venous 9.0 (L) 10.0 - 25.0 mmol/L    Patient Temperature 37.0 degrees Celsius    FiO2 24 %   Influenza A, and B PCR   Result Value Ref Range    Flu A Result Not Detected Not Detected    Flu B Result Not Detected Not Detected   SARS-CoV-2 RT PCR   Result Value Ref Range    Coronavirus 2019, PCR Not Detected Not Detected   MRSA Surveillance for Vancomycin De-escalation, PCR    Specimen: Anterior Nares; Swab   Result Value Ref Range    MRSA PCR Not Detected Not Detected   Blood Gas Lactic Acid, Venous   Result Value Ref Range    POCT Lactate, Venous 1.6 0.4 - 2.0 mmol/L   Troponin I, High Sensitivity   Result Value Ref Range    Troponin I, High Sensitivity 12 0 - 20 ng/L   Urinalysis with Reflex Culture and Microscopic   Result Value Ref Range    Color, Urine Yellow Straw, Yellow    Appearance, Urine Clear Clear    Specific Gravity, Urine 1.038 (N) 1.005 - 1.035    pH, Urine 7.0 5.0, 5.5, 6.0, 6.5, 7.0, 7.5, 8.0    Protein, Urine NEGATIVE NEGATIVE mg/dL    Glucose, Urine NEGATIVE NEGATIVE mg/dL    Blood, Urine MODERATE (2+) (A) NEGATIVE    Ketones, Urine NEGATIVE NEGATIVE mg/dL    Bilirubin, Urine NEGATIVE NEGATIVE    Urobilinogen, Urine <2.0 <2.0 mg/dL    Nitrite, Urine NEGATIVE NEGATIVE    Leukocyte Esterase, Urine NEGATIVE NEGATIVE   Extra Urine Gray Tube   Result Value Ref Range    Extra Tube 293    Urinalysis Microscopic   Result Value Ref Range    WBC, Urine NONE 1-5, NONE /HPF    RBC, Urine >20 (A) NONE, 1-2, 3-5 /HPF    Mucus, Urine 1+ Reference range not established. /LPF   Drug Screen, Urine   Result Value Ref Range    Amphetamine Screen, Urine Presumptive Negative Presumptive Negative    Barbiturate Screen, Urine Presumptive Negative Presumptive Negative    Benzodiazepines Screen, Urine Presumptive Negative Presumptive  Negative    Cannabinoid Screen, Urine Presumptive Negative Presumptive Negative    Cocaine Metabolite Screen, Urine Presumptive Negative Presumptive Negative    Fentanyl Screen, Urine Presumptive Negative Presumptive Negative    Opiate Screen, Urine Presumptive Negative Presumptive Negative    Oxycodone Screen, Urine Presumptive Negative Presumptive Negative    PCP Screen, Urine Presumptive Negative Presumptive Negative   Sodium, Urine Random   Result Value Ref Range    Sodium, Urine Random 101 mmol/L    Creatinine, Urine Random 124.3 20.0 - 370.0 mg/dL    Sodium/Creatinine Ratio 81 Not established. mmol/g Creat   Blood Gas Venous Full Panel   Result Value Ref Range    POCT pH, Venous 7.47 (H) 7.33 - 7.43 pH    POCT pCO2, Venous 36 (L) 41 - 51 mm Hg    POCT pO2, Venous 73 (H) 35 - 45 mm Hg    POCT SO2, Venous 96 (H) 45 - 75 %    POCT Oxy Hemoglobin, Venous 94.2 (H) 45.0 - 75.0 %    POCT Hematocrit Calculated, Venous 40.0 (L) 41.0 - 52.0 %    POCT Sodium, Venous 126 (L) 136 - 145 mmol/L    POCT Potassium, Venous 4.3 3.5 - 5.3 mmol/L    POCT Chloride, Venous 98 98 - 107 mmol/L    POCT Ionized Calicum, Venous 1.13 1.10 - 1.33 mmol/L    POCT Glucose, Venous 111 (H) 74 - 99 mg/dL    POCT Lactate, Venous 1.0 0.4 - 2.0 mmol/L    POCT Base Excess, Venous 2.7 -2.0 - 3.0 mmol/L    POCT HCO3 Calculated, Venous 26.2 (H) 22.0 - 26.0 mmol/L    POCT Hemoglobin, Venous 13.3 (L) 13.5 - 17.5 g/dL    POCT Anion Gap, Venous 6.0 (L) 10.0 - 25.0 mmol/L    Patient Temperature 37.0 degrees Celsius    FiO2 24 %   Acute Toxicology Panel, Blood   Result Value Ref Range    Acetaminophen <10.0 10.0 - 30.0 ug/mL    Salicylate  <3 4 - 20 mg/dL    Alcohol <10 <=10 mg/dL   Troponin I, High Sensitivity   Result Value Ref Range    Troponin I, High Sensitivity 15 0 - 20 ng/L          CT angio chest abdomen pelvis    Result Date: 2/4/2024  Interpreted By:  Valerie eMlgar, STUDY: CT ANGIO CHEST ABDOMEN PELVIS;  2/4/2024 12:49 am   INDICATION:  Signs/Symptoms:back pain.   COMPARISON: CT chest 02/03/2024   ACCESSION NUMBER(S): [id number]   ORDERING CLINICIAN: COLUMBA LUIS   TECHNIQUE: Axial CT images of the chest, abdomen and pelvis  before and after intravenous administration of contrast using CT angiographic technique. Coronal and sagittal images are reconstructed.  3D reconstructions were obtained at a separate workstation.   FINDINGS: VASCULAR: AORTA: Initial noncontrast images demonstrate no aortic intramural hematoma. No aortic aneurysm or dissection. Mild atherosclerotic disease.   CHEST:   HEART: Normal size. No pericardial effusion. Coronary artery calcifications noted however exam is not optimized for evaluation. MEDIASTINUM AND FELI: 10 mm mediastinal lymph nodes in the right upper paratracheal region. Right hilar lymph nodes measuring up to 14 mm. No pneumomediastinum. Patulous appearance of the esophagus. LUNG, PLEURA, LARGE AIRWAYS: Central airways are grossly patent. Upper lobe interstitial prominence with probable scarring mild bronchiectasis. Subpleural reticular opacities suggestive of fibrosis present. No focal consolidation, pleural effusion or sizable pneumothorax seen. CHEST WALL AND LOWER NECK: Within normal limits.   ABDOMEN:   BONES: No acute osseous abnormality. Minimal grade 1 anterolisthesis of L5 on S1 with bilateral pars defects. ABDOMINAL WALL: Diastasis of the rectus abdominus with small fat containing umbilical hernia. Small fat containing inguinal hernias noted. Foreign lymph nodes measuring up to 14 mm on the right and 10 mm on the left.   LIVER: Decreased attenuation of the liver parenchyma compatible with hepatic steatosis. BILE DUCTS: Normal caliber. GALLBLADDER: No calcified gallstones. No wall thickening. PANCREAS: Fatty infiltration present. SPLEEN: Within normal limits. ADRENALS:  Within normal limits. KIDNEYS: Symmetric renal enhancement. No hydronephrosis or perinephric fluid collection. URETERS: No  hydroureter.   PELVIS:   REPRODUCTIVE ORGANS: No pelvic masses. BLADDER: Contrast within the urinary bladder and upper collecting system which may be related to recent same-day CT imaging.   RETROPERITONEUM: Subcentimeter and mildly enlarged retroperitoneal lymph nodes and periportal lymph nodes measuring up to 12 mm. BOWEL: Normal caliber. Fluid content within the colon present. Diverticulosis without CT evidence of acute diverticulitis. Normal appendix. PERITONEUM: No ascites or free air, no fluid collection.       No aortic aneurysm or dissection.   Fluid contents within the colon which may be infectious or inflammatory.   Mild mediastinal, retroperitoneal, periportal and inguinal lymphadenopathy possibly reactive.   Interstitial opacities in the lung parenchyma which may be related to fibrosis and/or scarring. Mild superimposed interstitial edema not entirely excluded.   Minimal L5-S1 anterolisthesis with chronic appearing pars defects.   Hepatic steatosis, coronary artery calcifications and additional findings as detailed.   MACRO: None.   Signed by: Valerie Melgar 2/4/2024 2:15 AM Dictation workstation:   [id number]    CT head wo IV contrast    Result Date: 2/4/2024  Interpreted By:  Valerie Melgar, STUDY: CT HEAD WO IV CONTRAST;  2/4/2024 12:48 am   INDICATION: Signs/Symptoms:ams.   COMPARISON: None.   ACCESSION NUMBER(S): [id number]   ORDERING CLINICIAN: COLUMBA LUIS   TECHNIQUE: Axial noncontrast CT images of the head.   FINDINGS: BRAIN PARENCHYMA: Mild non-specific white matter changes and parenchymal volume loss present. Focus of encephalomalacia along the inferior surface of the left frontal lobe which may be related to remote trauma. No mass effect or midline shift.   HEMORRHAGE: No acute intracranial hemorrhage. VENTRICLES and EXTRA-AXIAL SPACES: The ventricles, sulci and basal cisterns enlarged, concordant with parenchymal volume loss. EXTRACRANIAL SOFT TISSUES: Within normal limits.  PARANASAL SINUSES/MASTOIDS: Scattered possible occasion of the ethmoid air cells and frontal sinuses. Mastoid air cells are patent. CALVARIUM: No depressed skull fracture. No destructive osseous lesion.   OTHER FINDINGS: None.       No acute intracranial hemorrhage or mass effect.   Left frontal focus of encephalomalacia and mild nonspecific white matter changes present.   MACRO: None.   Signed by: Valerie Melgar 2/4/2024 2:05 AM Dictation workstation:   [id number]    CT angio chest for pulmonary embolism    Result Date: 2/3/2024  STUDY: CT Angiogram of the Chest; 2/3/2024 at 10:24 p.m. INDICATION: Shortness of breath. COMPARISON: One-view CXR 2/3/2024. ACCESSION NUMBER(S): [id number] ORDERING CLINICIAN: COLUMBA LUIS TECHNIQUE:  CTA of the chest was performed with intravenous contrast. Images are reviewed and processed at a workstation according to the CT angiogram protocol with 3-D and/or MIP post processing imaging generated.  Omnipaque 350 75 mL was administered intravenously. Automated mA/kV exposure control was utilized and patient examination was performed in strict accordance with principles of ALARA. FINDINGS: Pulmonary arteries are adequately opacified without acute or chronic filling defects.  The thoracic aorta is normal in course and caliber without dissection or aneurysm. The heart is normal in size without pericardial effusion.  Thoracic lymph nodes are not enlarged.  Coronary calcifications are marker for coronary disease. There is no pleural effusion, pleural thickening, or pneumothorax. The airways are patent. Demonstrate mild chronic changes with minimal atelectasis and lingular lobe. Upper abdomen demonstrates no acute pathology.  There is fatty liver morphology. There are no acute fractures.  No suspicious bony lesions.    No pulmonary artery emboli.  No acute cardiopulmonary disease. Signed by Waylon Shi DO    XR chest 1 view    Result Date: 2/3/2024  STUDY: Chest Radiograph;   2/3/2024 9:59PM INDICATION: Shortness of breath. COMPARISON: None available ACCESSION NUMBER(S): [id number] ORDERING CLINICIAN: COLUMBA LUIS TECHNIQUE:  Frontal chest was obtained at 21:58 hours. Limitation: Patient body habitus FINDINGS: CARDIOMEDIASTINAL SILHOUETTE: Normal heart size.  Calcification of the aortic arch.  Narrowing of the trachea suggested.  LUNGS: No consolidative airspace disease or pulmonary edema.  No pleural effusions or pneumothorax. ABDOMEN: No remarkable upper abdominal findings.  BONES: Grossly intact.    Narrowing of the trachea is suggested.  Stricture or sequela are COPD are considerations.  There could also be pathologic thickening of the trachea.  Consider chest CT for additional evaluation. No consolidation, pleural effusion, or pulmonary edema. Signed by Fabian Thayer,           Assessment/Plan     1.  New onset atrial fibrillation with rapid ventricular response  -Unclear if there is truly an inciting event or if this has been ongoing for some time  -Telemetry monitoring  -TTE pending  -S/p a total of 7.5 mg of IV Lopressor in the ED with very variable heart rates now in the 110s to 120s and occasionally in the 130s now  -Cardiology consult appreciated    2.  Leukocytosis  -Blood cultures x 2 pending  -Continued on IV vancomycin/Zosyn  -Influenza A/B PCR negative, COVID-19 PCR negative  -No grossly evident sources of infection on imaging  -UA negative  -Possibly reactive but unclear at this time so we will err on side of caution and continue with IV antibiotics and can likely de-escalate if improving white count throughout today  -WBC = 21 --> 16.1  -CRP/ESR pending morning labs    3.  Acute encephalopathy  -Unclear if this is an infectious etiology versus metabolic derangements versus a possible neurodeficit  -Will continue management as noted below of and below  -He is appropriately interactive at this time but family prior to leaving had stated that they do not feel  like he is acting himself and is much slower to respond.    4.  CVA versus TIA  -CT head without contrast noted no acute intracranial processes but there is mention of a left frontal focus of encephalomalacia and mild nonspecific white matter changes present.  -TTE pending  -Telemetry monitoring  -Neurology consult appreciated  -No noted focal neurological deficits on examination  -He is to continue with stroke protocol with neuroassessments and adjust accordingly pending further evaluation and management by neurology  -Will likely require MRI imaging with MRA head/neck without contrast and MRI brain without contrast  -Permissive hypertension per stroke protocol    5.  Hyponatremia  -Sodium 129  -Urine osmolality, serum osmolality, urine sodium    6.  COPD with chronic hypoxic respiratory failure no signs of acute distress/exacerbation  -Continued on as needed nebulizer treatments  -Will continue on home Breo    7.  Acute deconditioning/ambulatory dysfunction  -PT/OT appreciated        Reagan Murphy DO

## 2024-02-04 NOTE — PROGRESS NOTES
Pharmacy to Dose Vancomycin - Results Assessment    Johnny Caicedo is a 63 y.o. male admitted for Hypokalemia. Pharmacy was consulted for vancomycin dosing and monitoring. Today is day 2 of vancomycin therapy.      Assessment  Vital signs reviewed, including temperature, HR, BP, I/Os.   Available lab results reviewed, including:WBC, serum creatinine, BUN, and MRSA screen.    Recent vancomycin received: 2000 mg x1 dose to present.              Trough level assessment:   2/5 @ 0500                Plan     Within goal trough range. Continue current vancomycin regimen.  Expected vancomycin duration: . days of total therapy, to be completed on ..   Follow for continued vancomycin need, clinical response, and s/s of toxicity.     Evan Marroquin, PharmD

## 2024-02-05 ENCOUNTER — APPOINTMENT (OUTPATIENT)
Dept: CARDIOLOGY | Facility: HOSPITAL | Age: 64
DRG: 871 | End: 2024-02-05
Payer: COMMERCIAL

## 2024-02-05 ENCOUNTER — APPOINTMENT (OUTPATIENT)
Dept: RADIOLOGY | Facility: HOSPITAL | Age: 64
DRG: 871 | End: 2024-02-05
Payer: COMMERCIAL

## 2024-02-05 LAB
ANION GAP SERPL CALC-SCNC: 13 MMOL/L (ref 10–20)
BUN SERPL-MCNC: 12 MG/DL (ref 6–23)
CALCIUM SERPL-MCNC: 7.2 MG/DL (ref 8.6–10.3)
CHLORIDE SERPL-SCNC: 99 MMOL/L (ref 98–107)
CO2 SERPL-SCNC: 26 MMOL/L (ref 21–32)
CREAT SERPL-MCNC: 0.89 MG/DL (ref 0.5–1.3)
EGFRCR SERPLBLD CKD-EPI 2021: >90 ML/MIN/1.73M*2
EJECTION FRACTION APICAL 4 CHAMBER: 57
EJECTION FRACTION: 64 %
ERYTHROCYTE [DISTWIDTH] IN BLOOD BY AUTOMATED COUNT: 14.2 % (ref 11.5–14.5)
EST. AVERAGE GLUCOSE BLD GHB EST-MCNC: 137 MG/DL
GLUCOSE SERPL-MCNC: 103 MG/DL (ref 74–99)
HBA1C MFR BLD: 6.4 %
HCT VFR BLD AUTO: 36.3 % (ref 41–52)
HGB BLD-MCNC: 11.4 G/DL (ref 13.5–17.5)
LEFT ATRIUM VOLUME AREA LENGTH INDEX BSA: 25.6 ML/M2
LEFT VENTRICLE INTERNAL DIMENSION DIASTOLE: 5 CM (ref 3.5–6)
LEFT VENTRICULAR OUTFLOW TRACT DIAMETER: 2.1 CM
MAGNESIUM SERPL-MCNC: 1.84 MG/DL (ref 1.6–2.4)
MCH RBC QN AUTO: 25.9 PG (ref 26–34)
MCHC RBC AUTO-ENTMCNC: 31.4 G/DL (ref 32–36)
MCV RBC AUTO: 82 FL (ref 80–100)
MITRAL VALVE E/E' RATIO: 7.42
NRBC BLD-RTO: 0 /100 WBCS (ref 0–0)
PLATELET # BLD AUTO: 177 X10*3/UL (ref 150–450)
POTASSIUM SERPL-SCNC: 3.9 MMOL/L (ref 3.5–5.3)
RBC # BLD AUTO: 4.41 X10*6/UL (ref 4.5–5.9)
RIGHT VENTRICLE FREE WALL PEAK S': 14 CM/S
RIGHT VENTRICLE PEAK SYSTOLIC PRESSURE: 31.1 MMHG
SODIUM SERPL-SCNC: 134 MMOL/L (ref 136–145)
TRICUSPID ANNULAR PLANE SYSTOLIC EXCURSION: 1.8 CM
UFH PPP CHRO-ACNC: 0.3 IU/ML
VANCOMYCIN TROUGH SERPL-MCNC: 15.8 UG/ML (ref 5–20)
WBC # BLD AUTO: 12.6 X10*3/UL (ref 4.4–11.3)

## 2024-02-05 PROCEDURE — 2500000001 HC RX 250 WO HCPCS SELF ADMINISTERED DRUGS (ALT 637 FOR MEDICARE OP): Performed by: INTERNAL MEDICINE

## 2024-02-05 PROCEDURE — 85027 COMPLETE CBC AUTOMATED: CPT | Performed by: INTERNAL MEDICINE

## 2024-02-05 PROCEDURE — 2500000004 HC RX 250 GENERAL PHARMACY W/ HCPCS (ALT 636 FOR OP/ED): Performed by: INTERNAL MEDICINE

## 2024-02-05 PROCEDURE — 2500000004 HC RX 250 GENERAL PHARMACY W/ HCPCS (ALT 636 FOR OP/ED)

## 2024-02-05 PROCEDURE — 80048 BASIC METABOLIC PNL TOTAL CA: CPT | Performed by: INTERNAL MEDICINE

## 2024-02-05 PROCEDURE — 80202 ASSAY OF VANCOMYCIN: CPT | Performed by: INTERNAL MEDICINE

## 2024-02-05 PROCEDURE — 2020000001 HC ICU ROOM DAILY

## 2024-02-05 PROCEDURE — 70544 MR ANGIOGRAPHY HEAD W/O DYE: CPT

## 2024-02-05 PROCEDURE — 85520 HEPARIN ASSAY: CPT | Performed by: INTERNAL MEDICINE

## 2024-02-05 PROCEDURE — 83735 ASSAY OF MAGNESIUM: CPT | Performed by: INTERNAL MEDICINE

## 2024-02-05 PROCEDURE — 36415 COLL VENOUS BLD VENIPUNCTURE: CPT | Performed by: INTERNAL MEDICINE

## 2024-02-05 PROCEDURE — 93306 TTE W/DOPPLER COMPLETE: CPT

## 2024-02-05 PROCEDURE — 99233 SBSQ HOSP IP/OBS HIGH 50: CPT | Performed by: INTERNAL MEDICINE

## 2024-02-05 PROCEDURE — 70551 MRI BRAIN STEM W/O DYE: CPT

## 2024-02-05 PROCEDURE — 2500000002 HC RX 250 W HCPCS SELF ADMINISTERED DRUGS (ALT 637 FOR MEDICARE OP, ALT 636 FOR OP/ED): Performed by: INTERNAL MEDICINE

## 2024-02-05 PROCEDURE — 70551 MRI BRAIN STEM W/O DYE: CPT | Performed by: RADIOLOGY

## 2024-02-05 PROCEDURE — 99232 SBSQ HOSP IP/OBS MODERATE 35: CPT | Performed by: INTERNAL MEDICINE

## 2024-02-05 PROCEDURE — 93306 TTE W/DOPPLER COMPLETE: CPT | Performed by: INTERNAL MEDICINE

## 2024-02-05 PROCEDURE — 70544 MR ANGIOGRAPHY HEAD W/O DYE: CPT | Performed by: RADIOLOGY

## 2024-02-05 RX ORDER — AMIODARONE HYDROCHLORIDE 200 MG/1
400 TABLET ORAL 2 TIMES DAILY
Status: DISCONTINUED | OUTPATIENT
Start: 2024-02-05 | End: 2024-02-07 | Stop reason: HOSPADM

## 2024-02-05 RX ORDER — AMIODARONE HYDROCHLORIDE 200 MG/1
200 TABLET ORAL DAILY
Status: DISCONTINUED | OUTPATIENT
Start: 2024-02-18 | End: 2024-02-07 | Stop reason: HOSPADM

## 2024-02-05 RX ADMIN — AMIODARONE HYDROCHLORIDE 400 MG: 400 TABLET ORAL at 21:14

## 2024-02-05 RX ADMIN — HEPARIN SODIUM 2000 UNITS/HR: 10000 INJECTION, SOLUTION INTRAVENOUS at 00:43

## 2024-02-05 RX ADMIN — FUROSEMIDE 40 MG: 10 INJECTION, SOLUTION INTRAMUSCULAR; INTRAVENOUS at 08:51

## 2024-02-05 RX ADMIN — VANCOMYCIN HYDROCHLORIDE 1500 MG: 1.5 INJECTION, POWDER, LYOPHILIZED, FOR SOLUTION INTRAVENOUS at 21:14

## 2024-02-05 RX ADMIN — PIPERACILLIN SODIUM AND TAZOBACTAM SODIUM 3.38 G: 3; .375 INJECTION, SOLUTION INTRAVENOUS at 16:21

## 2024-02-05 RX ADMIN — VANCOMYCIN HYDROCHLORIDE 1500 MG: 1.5 INJECTION, POWDER, LYOPHILIZED, FOR SOLUTION INTRAVENOUS at 10:03

## 2024-02-05 RX ADMIN — PIPERACILLIN SODIUM AND TAZOBACTAM SODIUM 3.38 G: 3; .375 INJECTION, SOLUTION INTRAVENOUS at 21:44

## 2024-02-05 RX ADMIN — DIGOXIN 125 MCG: 0.25 INJECTION INTRAMUSCULAR; INTRAVENOUS at 08:51

## 2024-02-05 RX ADMIN — FLUTICASONE FUROATE AND VILANTEROL TRIFENATATE 1 PUFF: 200; 25 POWDER RESPIRATORY (INHALATION) at 08:54

## 2024-02-05 RX ADMIN — AMIODARONE HYDROCHLORIDE 0.5 MG/MIN: 1.8 INJECTION, SOLUTION INTRAVENOUS at 04:49

## 2024-02-05 RX ADMIN — PIPERACILLIN SODIUM AND TAZOBACTAM SODIUM 3.38 G: 3; .375 INJECTION, SOLUTION INTRAVENOUS at 09:31

## 2024-02-05 RX ADMIN — AMIODARONE HYDROCHLORIDE 400 MG: 400 TABLET ORAL at 10:32

## 2024-02-05 RX ADMIN — HEPARIN SODIUM 2000 UNITS/HR: 10000 INJECTION, SOLUTION INTRAVENOUS at 13:47

## 2024-02-05 RX ADMIN — CYANOCOBALAMIN TAB 1000 MCG 1000 MCG: 1000 TAB at 08:50

## 2024-02-05 RX ADMIN — PIPERACILLIN SODIUM AND TAZOBACTAM SODIUM 3.38 G: 3; .375 INJECTION, SOLUTION INTRAVENOUS at 04:49

## 2024-02-05 ASSESSMENT — PAIN SCALES - GENERAL
PAINLEVEL_OUTOF10: 0 - NO PAIN

## 2024-02-05 ASSESSMENT — COGNITIVE AND FUNCTIONAL STATUS - GENERAL
MOBILITY SCORE: 24
DAILY ACTIVITIY SCORE: 24

## 2024-02-05 ASSESSMENT — PAIN - FUNCTIONAL ASSESSMENT
PAIN_FUNCTIONAL_ASSESSMENT: 0-10

## 2024-02-05 ASSESSMENT — ACTIVITIES OF DAILY LIVING (ADL): LACK_OF_TRANSPORTATION: NO

## 2024-02-05 NOTE — PROGRESS NOTES
2/7/26  1206  Spoke with patient, plan is still to return home when he's medically ready, he denies any home going needs.     02/05/24 1252   Discharge Planning   Living Arrangements Family members   Support Systems Family members   Assistance Needed none   Type of Residence Private residence   Number of Stairs to Enter Residence 3   Number of Stairs Within Residence 0   Home or Post Acute Services None   Patient expects to be discharged to: Home   Does the patient need discharge transport arranged? No   Financial Resource Strain   How hard is it for you to pay for the very basics like food, housing, medical care, and heating? Not very   Housing Stability   In the last 12 months, was there a time when you were not able to pay the mortgage or rent on time? N   In the last 12 months, how many places have you lived? 1   In the last 12 months, was there a time when you did not have a steady place to sleep or slept in a shelter (including now)? N   Transportation Needs   In the past 12 months, has lack of transportation kept you from medical appointments or from getting medications? no   In the past 12 months, has lack of transportation kept you from meetings, work, or from getting things needed for daily living? No     Spoke with patient at the bedside to discuss discharge planning. Patient lives at home with his brother, he lived in [place] for many years with his stepson but he got a new job in [place] so patient moved back to [place].  He wears 2L NC mostly at night, but has a concentrator and POC from a company in [place], no issues.  Patient states he's independent with all self care, drives, denies use of DME.  Patient plans to return home when he's medically ready, he denies any home going needs at this time.  Care transitions will follow to manage discharge plan.

## 2024-02-05 NOTE — PROGRESS NOTES
Occupational Therapy SCREEN                 Therapy Communication Note    Patient Name: Johnny Caicedo  MRN: [id number]  Today's Date: 2/5/2024     Discipline: Occupational Therapy    Missed Visit Reason: Missed Visit Reason: Patient in a medical procedure    Missed Time: Attempt  OT eval attempted. Pt off the unit for a procedure.

## 2024-02-05 NOTE — CONSULTS
Consults    Primary MD: Gwendolyn Bowling MD    Reason For Consult  Sepsis     History Of Present Illness  Johnny Caicedo is a 63 y.o.  male with a past medical history significant for HTN, HLD, COPD, chronic hypoxic respiratory failure on 2 L via NC, morbid obesity, prior tobacco use history having quit about 20 years ago, anxiety, depression and GERD.  The below information was obtained from the very limited records in the EMR.  Patient states that he receives the majority of his care in [place], [place].  He states that he does remember that he does take Breo, as needed albuterol, Lasix, a blood pressure medication as well as venlafaxine.  Patient was brought in by family for increasing bilateral lower extremity swelling, shortness of breath, generalized weakness and confusion.  Patient's family had noted that he had become very sporadically confused with these episodes only lasting about a few seconds.  He did have an episode while in the ED that lasted but a few minutes where at first he was completely not alert and oriented and did not know where he was but then promptly after returning from the CT scanner he was alert and oriented x 4 and appropriately interactive.  The patient states that he has felt generally unwell for about 2 days but noted sudden onset shortness of breath and just prior to presenting to the ED for further evaluation and management.  He denies any recent sick contacts, chemical/environmental exposures, changes in dietary habits or any recent traumatic events/falls other than noted above.  He denies any fevers, chills, night sweats, vision changes, auditory changes, change in taste/smell, loss of bowel/bladder control, loss consciousness, dizziness, vertigo, syncope, seizure-like activity, chest pain, palpitations, coughing, wheezing, congestion, hemoptysis, hematemesis, abdominal pain, nausea, vomiting, diarrhea, constipation, dysuria, hematuria, dyschezia, hematochezia any  lateralizing motor/sensory deficits other than noted above.      Past Medical History  He has no past medical history on file.    Surgical History  He has no past surgical history on file.     Social History     Occupational History    Not on file   Tobacco Use    Smoking status: Never    Smokeless tobacco: Never   Substance and Sexual Activity    Alcohol use: Not on file    Drug use: Not on file    Sexual activity: Not on file     Travel History   Travel since 01/05/24    No documented travel since 01/05/24         Family History  No family history on file.  Allergies  Patient has no known allergies.       There is no immunization history on file for this patient.  Medications  Home medications:  Medications Prior to Admission   Medication Sig Dispense Refill Last Dose    albuterol 90 mcg/actuation aerosol powdr breath activated inhaler Inhale 2 puffs every 6 hours if needed for wheezing.       atorvastatin (Lipitor) 40 mg tablet Take 1 tablet (40 mg) by mouth once daily.       fluticasone furoate-vilanteroL (Breo Ellipta) 200-25 mcg/dose inhaler Inhale 1 puff once daily.       furosemide (Lasix) 20 mg tablet Take 1 tablet (20 mg) by mouth once daily.       montelukast (Singulair) 10 mg tablet Take 1 tablet (10 mg) by mouth once daily at bedtime.       spironolactone (Aldactone) 25 mg tablet Take 1 tablet (25 mg) by mouth once daily.       venlafaxine XR (Effexor-XR) 37.5 mg 24 hr capsule Take 1 capsule (37.5 mg) by mouth once daily. Do not crush or chew.        Current medications:  Scheduled medications  amiodarone, 400 mg, oral, BID   Followed by  [START ON 2/18/2024] amiodarone, 200 mg, oral, Daily  cyanocobalamin, 1,000 mcg, oral, Daily  digoxin, 250 mcg, intravenous, Once   Followed by  digoxin, 125 mcg, intravenous, Daily  fluticasone furoate-vilanteroL, 1 puff, inhalation, Daily  furosemide, 40 mg, intravenous, Daily  iohexol, 150 mL, intravenous, Once in imaging  perflutren lipid microspheres, 0.5-10 mL of  dilution, intravenous, Once in imaging  perflutren protein A microsphere, 0.5 mL, intravenous, Once in imaging  piperacillin-tazobactam, 3.375 g, intravenous, q6h  sulfur hexafluoride microsphr, 2 mL, intravenous, Once in imaging  vancomycin, 1,500 mg, intravenous, q12h      Continuous medications  heparin, 0-4,500 Units/hr, Last Rate: 2,000 Units/hr (02/05/24 1347)      PRN medications  PRN medications: acetaminophen, heparin, ipratropium-albuteroL, metoprolol, ondansetron, oxygen    Review of Systems     Objective  Range of Vitals (last 24 hours)  Heart Rate:  []   Temp:  [36 °C (96.8 °F)-36.8 °C (98.2 °F)]   Resp:  [6-29]   BP: ()/()   Weight:  [153 kg (336 lb 8 oz)]   SpO2:  [93 %-99 %]   Daily Weight  02/05/24 : (!) 153 kg (336 lb 8 oz)    Body mass index is 42.06 kg/m².     Physical Exam     General: AAO*3  Skin: no rashes  Neck: supple  CVS: S1S2  Chest:CTAB  GI: soft, non tender  : no CVAT  Psych: alert,oriented  CNS: no FND    Relevant Results  Outside Hospital Results  No  Labs  Results from last 72 hours   Lab Units 02/05/24 0457 02/04/24  0535 02/03/24  2040   WBC AUTO x10*3/uL 12.6* 16.1* 21.0*   HEMOGLOBIN g/dL 11.4* 12.6* 14.3   HEMATOCRIT % 36.3* 39.9* 44.5   PLATELETS AUTO x10*3/uL 177 213 256   NEUTROS PCT AUTO %  --   --  90.8   LYMPHS PCT AUTO %  --   --  4.0   MONOS PCT AUTO %  --   --  3.6   EOS PCT AUTO %  --   --  0.1     Results from last 72 hours   Lab Units 02/05/24 0457 02/04/24  0535 02/03/24  2040   SODIUM mmol/L 134* 129* 129*   POTASSIUM mmol/L 3.9 4.0 4.6   CHLORIDE mmol/L 99 97* 97*   CO2 mmol/L 26 25 21   BUN mg/dL 12 12 11   CREATININE mg/dL 0.89 1.01 0.93   GLUCOSE mg/dL 103* 95 103*   CALCIUM mg/dL 7.2* 8.3* 9.3   ANION GAP mmol/L 13 11 16   EGFR mL/min/1.73m*2 >90 83 >90   PHOSPHORUS mg/dL  --  4.5  --      Results from last 72 hours   Lab Units 02/04/24  0535 02/03/24  2040   ALK PHOS U/L  --  53   BILIRUBIN TOTAL mg/dL  --  0.6   PROTEIN TOTAL g/dL  --   "8.1   ALT U/L  --  18   AST U/L  --  16   ALBUMIN g/dL 3.1* 3.8     Estimated Creatinine Clearance: 125 mL/min (by C-G formula based on SCr of 0.89 mg/dL).  C-Reactive Protein   Date Value Ref Range Status   02/04/2024 14.81 (H) <1.00 mg/dL Final     Sedimentation Rate   Date Value Ref Range Status   02/04/2024 93 (H) 0 - 20 mm/h Final     No results found for: \"HIV1X2\", \"HIVCONF\", \"SJGXCS2QZ\"  No results found for: \"HEPCABINIT\", \"HEPCAB\", \"HCVPCRQUANT\"  Microbiology  Susceptibility data from last 90 days.  Collected Specimen Info Organism   02/03/24 Blood culture from Peripheral Venipuncture Streptococcus dysgalactiae/canis       Assessment/Plan     Sepsis   Strep canis bacteremia   New onset a fib  Chronic hypoxic respiratory failure on 2 L via NC  Morbid obesity   Depression   Anxiety   GERD  New onset atrial fibrillation   CVA  COPD  HTN  HLD    Suggest:  Blood cx strep dysgalactiae  Follow repeat blood cx  MRI - no acute infarct  Nasal mrsa negative  Chest x ray no consolidation   C/w zosyn   C/w vancomycin   Check vancomycin trough and creatinine   Trend wbc and temps    Bradford Bernard MD  "

## 2024-02-05 NOTE — PROGRESS NOTES
"Vancomycin Dosing by Pharmacy- FOLLOW UP    Johnny Caicedo is a 63 y.o. year old male who Pharmacy has been consulted for vancomycin dosing for Vancomycin Indications: Pneumonia. Based on the patient's indication and renal status this patient will be dosed based on a goal AUC of 400-600.     Renal function is currently stable.    Current vancomycin dose:  1000 mg every 12 hours    Estimated vancomycin AUC on current dose: <400 mg/L.hr ; Below therapeutic AUC/MARISOL at steady state. Will dose adjust as below    Visit Vitals  /52   Pulse 77   Temp 36.2 °C (97.2 °F) (Temporal)   Resp 19           Lab Results   Component Value Date    CREATININE 0.89 02/05/2024    CREATININE 1.01 02/04/2024    CREATININE 0.93 02/03/2024       Patient weight is No results found for: \"PTWEIGHT\"    No results found for: \"CULTURE\"    I/O last 3 completed shifts:  In: 300 (2 mL/kg) [IV Piggyback:300]  Out: - (0 mL/kg)   Weight: 149.7 kg     Lab Results   Component Value Date    PATIENTTEMP 37.0 02/04/2024    PATIENTTEMP 37.0 02/03/2024          Assessment/Plan     Below goal AUC. Orders placed for new vancomcyin regimen of 1500 every 12 hours to begin at 1000.     This dosing regimen is predicted by [employer] to result in the following pharmacokinetic parameters:  Loading dose: N/A  Regimen: 1500 mg IV every 12 hours.  Start time: 11:54 on 02/05/2024  Exposure target: AUC24 (range)400-600 mg/L.hr   AUC24,ss: 491 mg/L.hr  Probability of AUC24 > 400: 78 %  Ctrough,ss: 13 mg/L  Probability of Ctrough,ss > 20: 31 %  Probability of nephrotoxicity (Lodise FORD 2009): 8 %    The next level will be obtained on 2/9 at 0500. May be obtained sooner if clinically indicated.   Will continue to monitor renal function daily while on vancomycin and order serum creatinine at least every 48 hours if not already ordered.  Follow for continued vancomycin needs, clinical response, and signs/symptoms of toxicity.     Timmy NewellD, BCPS      "

## 2024-02-05 NOTE — PROGRESS NOTES
Johnny Caicedo is a 63 y.o. male on day 1 of admission presenting with Hypokalemia.    Subjective   Johnny Caicedo is a 63 y.o.  male with a past medical history significant for HTN, HLD, COPD, chronic hypoxic respiratory failure on 2 L via NC, morbid obesity, prior tobacco use history having quit about 20 years ago, anxiety, depression and GERD.  The below information was obtained from the very limited records in the EMR.  Patient states that he receives the majority of his care in [place], [place].  He states that he does remember that he does take Breo, as needed albuterol, Lasix, a blood pressure medication as well as venlafaxine.  Patient was brought in by family for increasing bilateral lower extremity swelling, shortness of breath, generalized weakness and confusion.  Patient's family had noted that he had become very sporadically confused with these episodes only lasting about a few seconds.  He did have an episode while in the ED that lasted but a few minutes where at first he was completely not alert and oriented and did not know where he was but then promptly after returning from the CT scanner he was alert and oriented x 4 and appropriately interactive.  The patient states that he has felt generally unwell for about 2 days but noted sudden onset shortness of breath and just prior to presenting to the ED for further evaluation and management.  He denies any recent sick contacts, chemical/environmental exposures, changes in dietary habits or any recent traumatic events/falls other than noted above.  He denies any fevers, chills, night sweats, vision changes, auditory changes, change in taste/smell, loss of bowel/bladder control, loss consciousness, dizziness, vertigo, syncope, seizure-like activity, chest pain, palpitations, coughing, wheezing, congestion, hemoptysis, hematemesis, abdominal pain, nausea, vomiting, diarrhea, constipation, dysuria, hematuria, dyschezia, hematochezia any lateralizing  motor/sensory deficits other than noted above.     ED course:  1.  Vital signs on presentation: Temperature 36.8 °C, heart rate of 82, respirations 24, blood pressure 143/72, pulse ox of 94% initially on 4 L by nasal cannula but was down titrated back to his home O2 demands of 2 L via nasal cannula  2.  Promptly after arrival to the ED he did suddenly become tachycardic and an EKG noted atrial fibrillation with rapid ventricular response with rates as high as 160s  3.  Repeat EKG noted atrial fibrillation with minimal ST segment elevations in the inferior leads, rate of 133, , QRS 92, QTc of 429  4.  Given the patient's labs and unclear presentation or oral follow-up symptomology he was treated for multiple aspects with infection being the running diagnosis per the ED physician.  Patient was initiated on IV antibiotics with vancomycin/Zosyn/azithromycin.  He was also given a total of 7-1/2 mg of IV Lopressor with notable improvement in his rates to the 110s to 120s.  He was also given 2 g of IV magnesium, Toradol.  5.  WBC of 21  6.  Hemoglobin of 14.3  7.  Glucose of 103  8.  Sodium 129  9.  BUNs/creatinine of 11/0.93  10.  Magnesium of 1.33  11.  BNP of 207  12.  D-dimer of 857  13.  Influenza A/B PCR negative, COVID-19 PCR negative  14.  Blood cultures x 2 pending  15.  VBG: pH is 7.45, pCO2 37, pO2 47, SO2 of 77, [place] bicarb of 25.7  16.  XR chest: Narrowing of the trachea suggested stricture/sequela or COPD considerations,  17.  CTA chest: Noted no acute pulmonary emboli or acute cardiopulmonary processes  18.  Lactate of 2 CT head: .5 with a repeat of 1.6  19.  Noted no acute intracranial hemorrhage or mass effect but there is a left frontal focus of encephalomalacia and mild nonspecific white matter changes  20.  CTA chest/abdomen/pelvis: No aortic aneurysm or dissection, fluid contents within the colon suggestive of infectious versus inflammatory changes, mild  mediastinal/retroperitoneal/periportal/inguinal lymphadenopathy possibly reactive, interstitial opacities in the lung parenchyma which may be related to fibrosis/scarring or possible mildly superimposed interstitial edema not entirely excluded, minimal L5-S1 anterolisthesis with chronic appearing pars defects, hepatic steatosis, coronary artery calcifications and additional findings as noted below in the full report.  21.  Troponin level of 11 then 10 then 15  22.  UA was unremarkable for any infectious etiologies  23.  Urine drug screen was unremarkable  24.  Acetaminophen/salicylate/alcohol were within range                A 12 point ROS was performed with the patient denying any complaints at this time aside from those listed in the HPI above.    2/5: His mentation appears to be improving as his speech fluency and speed are improving.            Objective     Constitutional:       General: He is not in acute distress.     Appearance: He is obese. He is not ill-appearing or diaphoretic.   HENT:      Head: Normocephalic and atraumatic.      Mouth/Throat:      Mouth: Mucous membranes are moist.      Pharynx: Oropharynx is clear.   Eyes:      Extraocular Movements: Extraocular movements intact.      Conjunctiva/sclera: Conjunctivae normal.      Pupils: Pupils are equal, round, and reactive to light.   Neck:      Vascular: No carotid bruit.      Comments: No grossly evident JVD but difficult to assess given neck adiposity   Cardiovascular:      Rate and Rhythm: Rhythm irregular.      Pulses: Normal pulses.      Heart sounds: Murmur (heard best at the right upper sternal border) heard.   Pulmonary:      Effort: Pulmonary effort is normal. No respiratory distress.      Breath sounds: No wheezing or rhonchi.      Comments: Nonlabored on 2L via NC, diminished breath sounds throughout all lung fields with very faint crackles in the bases,   Chest:      Chest wall: No tenderness.   Abdominal:      Comments: Obese abdomen,  "unable to fully appreciate for masses/organomegaly due to body habitus, nontender, no guarding, no rebound tenderness, soft, bowel sounds present throughout   Musculoskeletal:      Cervical back: Normal range of motion and neck supple. No rigidity or tenderness.      Comments: Range of motion/strength appears globally diminished at likely a 4 out of 5 in the upper extremity but slightly weaker in the lower extremities and limited by edema and body habitus, he does have notable 2+ pitting edema to just below the knees bilaterally, I did not appreciate any worse swelling on 1 side versus the other in comparison to the ED documentation.   Lymphadenopathy:      Cervical: No cervical adenopathy.   Skin:     General: Skin is warm and dry.      Findings: No bruising, erythema, lesion or rash.   Neurological:      General: No focal deficit present.      Mental Status: He is alert and oriented to person, place, and time.      Comments: Decreased sensation to light touch of bilateral feet, finger-to-nose appears grossly intact, he did attempt to perform heel-to-shin but he stated that it was somewhat difficult because he felt like his lower extremities felt heavy, fluency and speed of speech are improving, he does appropriately interact, he is alert and oriented x 4   Psychiatric:         Mood and Affect: Mood normal.         Behavior: Behavior normal.         Thought Content: Thought content normal.         Judgment: Judgment normal.     Last Recorded Vitals  Blood pressure 108/68, pulse 79, temperature 36.4 °C (97.5 °F), resp. rate 22, height 1.905 m (6' 3\"), weight (!) 153 kg (336 lb 8 oz), SpO2 97 %.  Intake/Output last 3 Shifts:  I/O last 3 completed shifts:  In: 1989 (13 mL/kg) [P.O.:240; I.V.:1149 (7.5 mL/kg); IV Piggyback:600]  Out: 451 (3 mL/kg) [Urine:451 (0.1 mL/kg/hr)]  Weight: 152.6 kg     Relevant Results           This patient currently has cardiac telemetry ordered; if you would like to modify or discontinue " the telemetry order, click here to go to the orders activity to modify/discontinue the order.                 Assessment/Plan   New onset atrial fibrillation with rapid ventricular response  -Telemetry monitoring  -TTE pending  -S/P amiodarone gtt, now on oral load   -Continue digoxin. Level pending  -Heparin gtt for stroke ppx  -Cardiology consult appreciated     GPC bacteremia   -source unclear  -No grossly evident sources of infection on imaging  -UA negative  -TTE pending  -2/3 Bcx: GPCs   -2/4 Bcx: pending  -Continued on IV vancomycin/Zosyn while awaiting speciation  -ID consulting      Vitamin B12 deficiency   -repleting     ?CVA versus TIA  -CT head without contrast noted no acute intracranial processes but there is mention of a left frontal focus of encephalomalacia and mild nonspecific white matter changes present.  -TTE pending  -Telemetry monitoring  -Neurology consult appreciated  -No noted focal neurological deficits on examination, speech is improving   -MRI Brain/MRA head and neck pending  -Permissive hypertension per stroke protocol     Acute encephalopathy  -multifactorial 2/2 the above issues, improving      AOC HFpEF   -YANELIS pending   -Continue IV diuresis   -trend Scr and electrolytes   -Cardiology consulting    Hypervolemic Hyponatremia  -improving with diuresis      COPD with chronic hypoxic respiratory failure no signs of acute distress/exacerbation  -Continued on as needed nebulizer treatments  -Will continue on home Breo     Acute deconditioning/ambulatory dysfunction  -PT/OT appreciated         Waylon De La Fuente MD PhD

## 2024-02-05 NOTE — PROGRESS NOTES
Subjective Data:  Johnny Caicedo is a 63 y.o. male with a past medical history significant for HTN, HLD, COPD, chronic hypoxic respiratory failure on 2 L via NC, morbid obesity, prior tobacco use history having quit about 20 years ago, anxiety, depression and GERD.  The below information was obtained from the very limited records in the EMR.  Patient states that he receives the majority of his care in [place], [place].  He states that he does remember that he does take Breo, as needed albuterol, Lasix, a blood pressure medication as well as venlafaxine.  Patient was brought in by family for increasing bilateral lower extremity swelling, shortness of breath, generalized weakness and confusion.  Patient's family had noted that he had become very sporadically confused with these episodes only lasting about a few seconds.  He did have an episode while in the ED that lasted but a few minutes where at first he was completely not alert and oriented and did not know where he was but then promptly after returning from the CT scanner he was alert and oriented x 4 and appropriately interactive.  The patient states that he has felt generally unwell for about 2 days but noted sudden onset shortness of breath and just prior to presenting to the ED for further evaluation and management.  He denies any recent sick contacts, chemical/environmental exposures, changes in dietary habits or any recent traumatic events/falls other than noted above.  He denies any fevers, chills, night sweats, vision changes, auditory changes, change in taste/smell, loss of bowel/bladder control, loss consciousness, dizziness, vertigo, syncope, seizure-like activity, chest pain, palpitations, coughing, wheezing, congestion, hemoptysis, hematemesis, abdominal pain, nausea, vomiting, diarrhea, constipation, dysuria, hematuria, dyschezia, hematochezia any lateralizing motor/sensory deficits other than noted above.   2/5/2024 : Remains in afib. Feels  "better    Overnight Events:    Remains in afib     Objective Data:  Last Recorded Vitals:  Vitals:    02/05/24 0521 02/05/24 0600 02/05/24 0700 02/05/24 0753   BP:  105/52 108/68    BP Location:       Patient Position:       Pulse:  77 79    Resp:  19 22    Temp:       TempSrc:       SpO2:  95% 98% 97%   Weight: (!) 153 kg (336 lb 8 oz)      Height:           Last Labs:  CBC - 2/5/2024:  4:57 AM  12.6 11.4 177    36.3      CMP - 2/5/2024:  4:57 AM  7.2 8.1 16 --- 0.6   4.5 3.1 18 53      PTT - No results in last year.  _   _ _     TROPHS   Date/Time Value Ref Range Status   02/04/2024 01:55 AM 15 0 - 20 ng/L Final   02/03/2024 10:57 PM 12 0 - 20 ng/L Final   02/03/2024 08:40 PM 11 0 - 20 ng/L Final     BNP   Date/Time Value Ref Range Status   02/03/2024 08:40  0 - 99 pg/mL Final     LDLCALC   Date/Time Value Ref Range Status   02/04/2024 05:35 AM 45 <=99 mg/dL Final     Comment:                                 Near   Borderline      AGE      Desirable  Optimal    High     High     Very High     0-19 Y     0 - 109     ---    110-129   >/= 130     ----    20-24 Y     0 - 119     ---    120-159   >/= 160     ----      >24 Y     0 -  99   100-129  130-159   160-189     >/=190       VLDL   Date/Time Value Ref Range Status   02/04/2024 05:35 AM 12 0 - 40 mg/dL Final      Last I/O:  I/O last 3 completed shifts:  In: 1989 (13 mL/kg) [P.O.:240; I.V.:1149 (7.5 mL/kg); IV Piggyback:600]  Out: 451 (3 mL/kg) [Urine:451 (0.1 mL/kg/hr)]  Weight: 152.6 kg     Past Cardiology Tests (Last 3 Years):  EKG:  No results found for this or any previous visit from the past 1095 days.    Echo:  No results found for this or any previous visit from the past 1095 days.    Ejection Fractions:  No results found for: \"EF\"  Cath:  No results found for this or any previous visit from the past 1095 days.    Stress Test:  No results found for this or any previous visit from the past 1095 days.    Cardiac Imaging:  No results found for this or " any previous visit from the past 1095 days.      Inpatient Medications:  Scheduled medications   Medication Dose Route Frequency    cyanocobalamin  1,000 mcg oral Daily    digoxin  250 mcg intravenous Once    Followed by    digoxin  125 mcg intravenous Daily    fluticasone furoate-vilanteroL  1 puff inhalation Daily    furosemide  40 mg intravenous Daily    iohexol  150 mL intravenous Once in imaging    perflutren lipid microspheres  0.5-10 mL of dilution intravenous Once in imaging    perflutren protein A microsphere  0.5 mL intravenous Once in imaging    piperacillin-tazobactam  3.375 g intravenous q6h    sulfur hexafluoride microsphr  2 mL intravenous Once in imaging    vancomycin  1,500 mg intravenous q12h     PRN medications   Medication    acetaminophen    heparin    ipratropium-albuteroL    metoprolol    ondansetron    oxygen     Continuous Medications   Medication Dose Last Rate    amiodarone  0.5-1 mg/min 0.5 mg/min (02/05/24 0449)    heparin  0-4,500 Units/hr 2,000 Units/hr (02/05/24 0043)       Physical Exam:  Constitutional:       General: He is not in acute distress.     Appearance: He is obese. He is not ill-appearing or diaphoretic.   HENT:      Head: Normocephalic and atraumatic.      Mouth/Throat:      Mouth: Mucous membranes are moist.      Pharynx: Oropharynx is clear.   Eyes:      Extraocular Movements: Extraocular movements intact.      Conjunctiva/sclera: Conjunctivae normal.      Pupils: Pupils are equal, round, and reactive to light.   Neck:      Vascular: No carotid bruit.      Comments: No grossly evident JVD but difficult to assess given neck adiposity   Cardiovascular:      Rate and Rhythm: Tachycardia present. Rhythm irregular.      Pulses: Normal pulses.      Heart sounds: Murmur (heard best at the right upper sternal border) heard.   Pulmonary:      Effort: Pulmonary effort is normal. No respiratory distress.      Breath sounds: No wheezing or rhonchi.      Comments: Nonlabored on 2L  via NC, diminished breath sounds throughout all lung fields with very faint crackles in the bases,   Chest:      Chest wall: No tenderness.   Abdominal:      Comments: Obese abdomen, unable to fully appreciate for masses/organomegaly due to body habitus, nontender, no guarding, no rebound tenderness, soft, bowel sounds present throughout   Musculoskeletal:      Cervical back: Normal range of motion and neck supple. No rigidity or tenderness.      Comments: Range of motion/strength appears globally diminished at likely a 4 out of 5 in the upper extremity but slightly weaker in the lower extremities and limited by edema and body habitus, he does have notable 2+ pitting edema to just below the knees bilaterally, I did not appreciate any worse swelling on 1 side versus the other in comparison to the ED documentation.   Lymphadenopathy:      Cervical: No cervical adenopathy.   Skin:     General: Skin is warm and dry.      Findings: No bruising, erythema, lesion or rash.   Neurological:      General: No focal deficit present.      Mental Status: He is alert and oriented to person, place, and time.      Comments: Decreased sensation to light touch of bilateral feet, finger-to-nose appears grossly intact, he did attempt to perform heel-to-shin but he stated that it was somewhat difficult because he felt like his lower extremities felt heavy, fluent speech but somewhat slow to response, he does appropriately interact, he is alert and oriented x 4   Psychiatric:         Mood and Affect: Mood normal.         Behavior: Behavior normal.         Thought Content: Thought content normal.         Judgment: Judgment normal.      Assessment/Plan   1) Atrial Fib with RVR  Start IV Amio with relative hypotension  Dig loading  IV Heparin  Echo  2/5/2024:  Check dig level in am  Continue IV amio/Heparin  Review echo     2) Acute diastolic heart failure  IV lasix  Echo  Peripheral IV 02/03/24 20 G Right;Anterior Forearm (Active)   Site  Assessment Edematous;Tender;Clean;Dry;Intact;Ecchymotic 02/05/24 0400   Dressing Type Transparent 02/05/24 0400   Line Status Saline locked 02/05/24 0400   Dressing Status Clean;Dry;Occlusive 02/05/24 0400   Number of days: 2       Peripheral IV 02/04/24 20 G Distal;Right;Anterior Wrist (Active)   Site Assessment Clean;Dry;Intact 02/05/24 0400   Dressing Type Transparent 02/05/24 0400   Line Status Infusing 02/05/24 0400   Dressing Status Clean;Dry;Occlusive 02/05/24 0400   Number of days: 1       Peripheral IV 02/04/24 20 G Left;Proximal;Anterior Forearm (Active)   Site Assessment Clean;Dry;Intact 02/05/24 0400   Dressing Type Transparent 02/05/24 0400   Line Status Infusing;Lab draw 02/05/24 0400   Dressing Status Clean;Dry;Occlusive 02/05/24 0400   Number of days: 1       Code Status:  Full Code    I spent 30 minutes in the professional and overall care of this patient.        Gerry Laird MD

## 2024-02-05 NOTE — PROGRESS NOTES
Physical Therapy                 Therapy Communication Note    Patient Name: Johnny Caicedo  MRN: [id number]  Today's Date: 2/5/2024     Discipline: Physical Therapy    Missed Visit Reason: Missed Visit Reason: Patient in a medical procedure (WILL SEE FOR EVAL AT A LATER TIME)    Missed Time: Attempt    Comment:

## 2024-02-06 ENCOUNTER — APPOINTMENT (OUTPATIENT)
Dept: VASCULAR MEDICINE | Facility: HOSPITAL | Age: 64
DRG: 871 | End: 2024-02-06
Payer: COMMERCIAL

## 2024-02-06 LAB
ANION GAP SERPL CALC-SCNC: 12 MMOL/L (ref 10–20)
BACTERIA BLD AEROBE CULT: ABNORMAL
BACTERIA BLD CULT: ABNORMAL
BUN SERPL-MCNC: 14 MG/DL (ref 6–23)
CALCIUM SERPL-MCNC: 8 MG/DL (ref 8.6–10.3)
CHLORIDE SERPL-SCNC: 101 MMOL/L (ref 98–107)
CO2 SERPL-SCNC: 27 MMOL/L (ref 21–32)
CREAT SERPL-MCNC: 1.05 MG/DL (ref 0.5–1.3)
DIGOXIN SERPL-MCNC: 0.48 NG/ML (ref 0.8–?)
EGFRCR SERPLBLD CKD-EPI 2021: 79 ML/MIN/1.73M*2
ERYTHROCYTE [DISTWIDTH] IN BLOOD BY AUTOMATED COUNT: 14.3 % (ref 11.5–14.5)
GLUCOSE SERPL-MCNC: 99 MG/DL (ref 74–99)
GRAM STN SPEC: ABNORMAL
GRAM STN SPEC: ABNORMAL
HCT VFR BLD AUTO: 38 % (ref 41–52)
HGB BLD-MCNC: 11.9 G/DL (ref 13.5–17.5)
MAGNESIUM SERPL-MCNC: 2 MG/DL (ref 1.6–2.4)
MCH RBC QN AUTO: 26.2 PG (ref 26–34)
MCHC RBC AUTO-ENTMCNC: 31.3 G/DL (ref 32–36)
MCV RBC AUTO: 84 FL (ref 80–100)
NRBC BLD-RTO: 0 /100 WBCS (ref 0–0)
PLATELET # BLD AUTO: 208 X10*3/UL (ref 150–450)
POTASSIUM SERPL-SCNC: 3.7 MMOL/L (ref 3.5–5.3)
RBC # BLD AUTO: 4.55 X10*6/UL (ref 4.5–5.9)
SODIUM SERPL-SCNC: 136 MMOL/L (ref 136–145)
UFH PPP CHRO-ACNC: 0.3 IU/ML
WBC # BLD AUTO: 10 X10*3/UL (ref 4.4–11.3)

## 2024-02-06 PROCEDURE — 83735 ASSAY OF MAGNESIUM: CPT | Performed by: INTERNAL MEDICINE

## 2024-02-06 PROCEDURE — 80048 BASIC METABOLIC PNL TOTAL CA: CPT | Performed by: INTERNAL MEDICINE

## 2024-02-06 PROCEDURE — 2500000004 HC RX 250 GENERAL PHARMACY W/ HCPCS (ALT 636 FOR OP/ED): Performed by: INTERNAL MEDICINE

## 2024-02-06 PROCEDURE — 2020000001 HC ICU ROOM DAILY

## 2024-02-06 PROCEDURE — 85520 HEPARIN ASSAY: CPT | Performed by: INTERNAL MEDICINE

## 2024-02-06 PROCEDURE — 36415 COLL VENOUS BLD VENIPUNCTURE: CPT | Performed by: INTERNAL MEDICINE

## 2024-02-06 PROCEDURE — 80162 ASSAY OF DIGOXIN TOTAL: CPT | Performed by: INTERNAL MEDICINE

## 2024-02-06 PROCEDURE — 2500000004 HC RX 250 GENERAL PHARMACY W/ HCPCS (ALT 636 FOR OP/ED)

## 2024-02-06 PROCEDURE — 2500000002 HC RX 250 W HCPCS SELF ADMINISTERED DRUGS (ALT 637 FOR MEDICARE OP, ALT 636 FOR OP/ED): Performed by: INTERNAL MEDICINE

## 2024-02-06 PROCEDURE — 85027 COMPLETE CBC AUTOMATED: CPT | Performed by: INTERNAL MEDICINE

## 2024-02-06 PROCEDURE — 99233 SBSQ HOSP IP/OBS HIGH 50: CPT | Performed by: INTERNAL MEDICINE

## 2024-02-06 PROCEDURE — 93880 EXTRACRANIAL BILAT STUDY: CPT

## 2024-02-06 PROCEDURE — 93880 EXTRACRANIAL BILAT STUDY: CPT | Performed by: SURGERY

## 2024-02-06 PROCEDURE — 2500000001 HC RX 250 WO HCPCS SELF ADMINISTERED DRUGS (ALT 637 FOR MEDICARE OP): Performed by: INTERNAL MEDICINE

## 2024-02-06 RX ADMIN — FUROSEMIDE 40 MG: 10 INJECTION, SOLUTION INTRAMUSCULAR; INTRAVENOUS at 09:42

## 2024-02-06 RX ADMIN — AMIODARONE HYDROCHLORIDE 400 MG: 400 TABLET ORAL at 09:43

## 2024-02-06 RX ADMIN — PIPERACILLIN SODIUM AND TAZOBACTAM SODIUM 3.38 G: 3; .375 INJECTION, SOLUTION INTRAVENOUS at 22:03

## 2024-02-06 RX ADMIN — PIPERACILLIN SODIUM AND TAZOBACTAM SODIUM 3.38 G: 3; .375 INJECTION, SOLUTION INTRAVENOUS at 18:47

## 2024-02-06 RX ADMIN — HEPARIN SODIUM 2000 UNITS/HR: 10000 INJECTION, SOLUTION INTRAVENOUS at 16:02

## 2024-02-06 RX ADMIN — VANCOMYCIN HYDROCHLORIDE 1500 MG: 1.5 INJECTION, POWDER, LYOPHILIZED, FOR SOLUTION INTRAVENOUS at 12:48

## 2024-02-06 RX ADMIN — HEPARIN SODIUM 2000 UNITS/HR: 10000 INJECTION, SOLUTION INTRAVENOUS at 04:29

## 2024-02-06 RX ADMIN — PIPERACILLIN SODIUM AND TAZOBACTAM SODIUM 3.38 G: 3; .375 INJECTION, SOLUTION INTRAVENOUS at 12:48

## 2024-02-06 RX ADMIN — CYANOCOBALAMIN TAB 1000 MCG 1000 MCG: 1000 TAB at 09:43

## 2024-02-06 RX ADMIN — PIPERACILLIN SODIUM AND TAZOBACTAM SODIUM 3.38 G: 3; .375 INJECTION, SOLUTION INTRAVENOUS at 04:29

## 2024-02-06 RX ADMIN — DIGOXIN 125 MCG: 0.25 INJECTION INTRAMUSCULAR; INTRAVENOUS at 09:44

## 2024-02-06 RX ADMIN — AMIODARONE HYDROCHLORIDE 400 MG: 400 TABLET ORAL at 22:03

## 2024-02-06 ASSESSMENT — PAIN - FUNCTIONAL ASSESSMENT
PAIN_FUNCTIONAL_ASSESSMENT: 0-10

## 2024-02-06 ASSESSMENT — PAIN SCALES - GENERAL
PAINLEVEL_OUTOF10: 0 - NO PAIN

## 2024-02-06 NOTE — PROGRESS NOTES
Occupational Therapy SCREEN                 Therapy Communication Note    Patient Name: Johnny Caicedo  MRN: [id number]  Today's Date: 2/6/2024     Discipline: Occupational Therapy    The patient was screened for need of OT services. Chart reviewed. Pt with nursing daily activity [facility] of 24. Spoke with PT, who saw the patient. Pt is SBA for mobility. SBA for self care in the bathroom. Pt appears close to baseline and not skilled OT eval is warranted at this time. DC

## 2024-02-06 NOTE — PROGRESS NOTES
Johnny Caicedo is a 63 y.o. male on day 2 of admission presenting with Hypokalemia.    Subjective   Interval History: No new complaints     Review of Systems    As above    Objective   Range of Vitals (last 24 hours)  Heart Rate:  [72-95]   Temp:  [35.8 °C (96.4 °F)-36.5 °C (97.7 °F)]   Resp:  [20-25]   BP: ()/(51-88)   Weight:  [153 kg (337 lb 4.9 oz)]   SpO2:  [92 %-100 %]   Daily Weight  02/05/24 : (!) 153 kg (337 lb 4.9 oz)    Body mass index is 42.16 kg/m².    Physical Exam    General: AAO*3  Skin: no rashes  Neck: supple  CVS: S1S2  Chest:CTAB  GI: soft, non tender  : no CVAT  Psych: alert,oriented  CNS: no FND      Antibiotics  sodium chloride 0.9 % bolus 500 mL  piperacillin-tazobactam-dextrose (Zosyn) IV 4.5 g  azithromycin (Zithromax) in dextrose 5 % in water (D5W) 250 mL  mg  vancomycin (Vancocin) 2 g in dextrose 5 % in water (D5W) 500 mL IV  iohexol (OMNIPaque) 350 mg iodine/mL solution 75 mL  ketorolac (Toradol) injection 15 mg  iohexol (OMNIPaque) 350 mg iodine/mL solution 150 mL  metoprolol tartrate (Lopressor) injection 2.5 mg  magnesium sulfate IV 2 g  acetaminophen (Tylenol) tablet 650 mg  metoprolol tartrate (Lopressor) injection 5 mg  acetaminophen (Tylenol) tablet 650 mg  ondansetron (Zofran) injection 4 mg  piperacillin-tazobactam-dextrose (Zosyn) IV 3.375 g  oxygen (O2) therapy  perflutren lipid microspheres (Definity) injection 0.5-10 mL of dilution  sulfur hexafluoride microsphr (Lumason) injection 24.28 mg  perflutren protein A microsphere (Optison) injection 0.5 mL  vancomycin (Vancocin) 1,000 mg in dextrose 5% water 200 mL  heparin (porcine) injection 7,500 Units  metoprolol tartrate (Lopressor) injection 5 mg  ipratropium-albuteroL (Duo-Neb) 0.5-2.5 mg/3 mL nebulizer solution 3 mL  furosemide (Lasix) injection 20 mg  fluticasone furoate-vilanteroL (Breo Ellipta) 200-25 mcg/dose inhaler 1 puff  amiodarone (Nexterone) 150 mg in dextrose,iso-osm 100 mL (1.5 mg/mL) IV  (premix)  amiodarone (Nexterone) 360 mg in dextrose,iso-osm 200 mL (1.8 mg/mL) infusion (premix)  heparin (porcine) injection 10,000 Units  heparin 25,000 Units in dextrose 5% 250 mL (100 Units/mL) infusion (premix)  heparin (porcine) injection 5,000-10,000 Units  digoxin (Lanoxin) injection 500 mcg  digoxin (Lanoxin) injection 250 mcg  digoxin (Lanoxin) injection 125 mcg  furosemide (Lasix) injection 40 mg  cyanocobalamin (Vitamin B-12) tablet 1,000 mcg  atorvastatin (Lipitor) tablet  venlafaxine (Effexor-XR) 24 hr capsule  spironolactone (Aldactone) tablet  furosemide (Lasix) tablet      montelukast (Singulair) tablet  vancomycin (Vancocin) 1,500 mg in dextrose 5 % in water (D5W) 500 mL IV  amiodarone (Pacerone) tablet 400 mg  amiodarone (Pacerone) tablet 200 mg      Relevant Results  Labs  Results from last 72 hours   Lab Units 02/06/24  0433 02/05/24  0457 02/04/24  0535 02/03/24  2040   WBC AUTO x10*3/uL 10.0 12.6* 16.1* 21.0*   HEMOGLOBIN g/dL 11.9* 11.4* 12.6* 14.3   HEMATOCRIT % 38.0* 36.3* 39.9* 44.5   PLATELETS AUTO x10*3/uL 208 177 213 256   NEUTROS PCT AUTO %  --   --   --  90.8   LYMPHS PCT AUTO %  --   --   --  4.0   MONOS PCT AUTO %  --   --   --  3.6   EOS PCT AUTO %  --   --   --  0.1     Results from last 72 hours   Lab Units 02/06/24  0433 02/05/24  0457 02/04/24  0535   SODIUM mmol/L 136 134* 129*   POTASSIUM mmol/L 3.7 3.9 4.0   CHLORIDE mmol/L 101 99 97*   CO2 mmol/L 27 26 25   BUN mg/dL 14 12 12   CREATININE mg/dL 1.05 0.89 1.01   GLUCOSE mg/dL 99 103* 95   CALCIUM mg/dL 8.0* 7.2* 8.3*   ANION GAP mmol/L 12 13 11   EGFR mL/min/1.73m*2 79 >90 83   PHOSPHORUS mg/dL  --   --  4.5     Results from last 72 hours   Lab Units 02/04/24  0535 02/03/24  2040   ALK PHOS U/L  --  53   BILIRUBIN TOTAL mg/dL  --  0.6   PROTEIN TOTAL g/dL  --  8.1   ALT U/L  --  18   AST U/L  --  16   ALBUMIN g/dL 3.1* 3.8     Estimated Creatinine Clearance: 112.6 mL/min (by C-G formula based on SCr of 1.05  mg/dL).  C-Reactive Protein   Date Value Ref Range Status   02/04/2024 14.81 (H) <1.00 mg/dL Final     Microbiology  Susceptibility data from last 14 days.  Collected Specimen Info Organism Clindamycin Erythromycin Penicillin Tetracycline   02/03/24 Blood culture from Peripheral Venipuncture Streptococcus dysgalactiae/canis S S S S   02/03/24 Blood culture from Peripheral Venipuncture Streptococcus dysgalactiae/canis         Assessment:  Sepsis   Strep canis bacteremia   New onset a fib  Chronic hypoxic respiratory failure on 2 L via NC  Morbid obesity   Depression   Anxiety   GERD  New onset atrial fibrillation   CVA  COPD  HTN  HLD     Suggest:  Blood cx strep dysgalactiae  Repeat blood cx ngtd  MRI - no acute infarct  Nasal mrsa negative  Chest x ray no consolidation   C/w zosyn   Dc vancomycin  Check vancomycin trough and creatinine   Trend wbc and temps       Bradford Bernard MD

## 2024-02-06 NOTE — CARE PLAN
The patient's goals for the shift include      The clinical goals for the shift include will remain in sr this shift

## 2024-02-06 NOTE — PROGRESS NOTES
Vancomycin Dosing by Pharmacy- Cessation of Therapy    Consult to pharmacy for vancomycin dosing has been discontinued by the prescriber, pharmacy will sign off at this time.    Please call pharmacy if there are further questions or re-enter a consult if vancomycin is resumed.     Amador Stafford [facility]

## 2024-02-06 NOTE — PROGRESS NOTES
Johnny Caicedo is a 63 y.o. male on day 2 of admission presenting with Hypokalemia.    Subjective   Johnny Caicedo is a 63 y.o.  male with a past medical history significant for HTN, HLD, COPD, chronic hypoxic respiratory failure on 2 L via NC, morbid obesity, prior tobacco use history having quit about 20 years ago, anxiety, depression and GERD.  The below information was obtained from the very limited records in the EMR.  Patient states that he receives the majority of his care in [place], [place].  He states that he does remember that he does take Breo, as needed albuterol, Lasix, a blood pressure medication as well as venlafaxine.  Patient was brought in by family for increasing bilateral lower extremity swelling, shortness of breath, generalized weakness and confusion.  Patient's family had noted that he had become very sporadically confused with these episodes only lasting about a few seconds.  He did have an episode while in the ED that lasted but a few minutes where at first he was completely not alert and oriented and did not know where he was but then promptly after returning from the CT scanner he was alert and oriented x 4 and appropriately interactive.  The patient states that he has felt generally unwell for about 2 days but noted sudden onset shortness of breath and just prior to presenting to the ED for further evaluation and management.  He denies any recent sick contacts, chemical/environmental exposures, changes in dietary habits or any recent traumatic events/falls other than noted above.  He denies any fevers, chills, night sweats, vision changes, auditory changes, change in taste/smell, loss of bowel/bladder control, loss consciousness, dizziness, vertigo, syncope, seizure-like activity, chest pain, palpitations, coughing, wheezing, congestion, hemoptysis, hematemesis, abdominal pain, nausea, vomiting, diarrhea, constipation, dysuria, hematuria, dyschezia, hematochezia any lateralizing  motor/sensory deficits other than noted above.     ED course:  1.  Vital signs on presentation: Temperature 36.8 °C, heart rate of 82, respirations 24, blood pressure 143/72, pulse ox of 94% initially on 4 L by nasal cannula but was down titrated back to his home O2 demands of 2 L via nasal cannula  2.  Promptly after arrival to the ED he did suddenly become tachycardic and an EKG noted atrial fibrillation with rapid ventricular response with rates as high as 160s  3.  Repeat EKG noted atrial fibrillation with minimal ST segment elevations in the inferior leads, rate of 133, , QRS 92, QTc of 429  4.  Given the patient's labs and unclear presentation or oral follow-up symptomology he was treated for multiple aspects with infection being the running diagnosis per the ED physician.  Patient was initiated on IV antibiotics with vancomycin/Zosyn/azithromycin.  He was also given a total of 7-1/2 mg of IV Lopressor with notable improvement in his rates to the 110s to 120s.  He was also given 2 g of IV magnesium, Toradol.  5.  WBC of 21  6.  Hemoglobin of 14.3  7.  Glucose of 103  8.  Sodium 129  9.  BUNs/creatinine of 11/0.93  10.  Magnesium of 1.33  11.  BNP of 207  12.  D-dimer of 857  13.  Influenza A/B PCR negative, COVID-19 PCR negative  14.  Blood cultures x 2 pending  15.  VBG: pH is 7.45, pCO2 37, pO2 47, SO2 of 77, [place] bicarb of 25.7  16.  XR chest: Narrowing of the trachea suggested stricture/sequela or COPD considerations,  17.  CTA chest: Noted no acute pulmonary emboli or acute cardiopulmonary processes  18.  Lactate of 2 CT head: .5 with a repeat of 1.6  19.  Noted no acute intracranial hemorrhage or mass effect but there is a left frontal focus of encephalomalacia and mild nonspecific white matter changes  20.  CTA chest/abdomen/pelvis: No aortic aneurysm or dissection, fluid contents within the colon suggestive of infectious versus inflammatory changes, mild  mediastinal/retroperitoneal/periportal/inguinal lymphadenopathy possibly reactive, interstitial opacities in the lung parenchyma which may be related to fibrosis/scarring or possible mildly superimposed interstitial edema not entirely excluded, minimal L5-S1 anterolisthesis with chronic appearing pars defects, hepatic steatosis, coronary artery calcifications and additional findings as noted below in the full report.  21.  Troponin level of 11 then 10 then 15  22.  UA was unremarkable for any infectious etiologies  23.  Urine drug screen was unremarkable  24.  Acetaminophen/salicylate/alcohol were within range                A 12 point ROS was performed with the patient denying any complaints at this time aside from those listed in the HPI above.    2/5: His mentation appears to be improving as his speech fluency and speed are improving.     2/6: He is without complaint            Objective     Constitutional:       General: He is not in acute distress.     Appearance: He is obese. He is not ill-appearing or diaphoretic.   HENT:      Head: Normocephalic and atraumatic.      Mouth/Throat:      Mouth: Mucous membranes are moist.      Pharynx: Oropharynx is clear.   Eyes:      Extraocular Movements: Extraocular movements intact.      Conjunctiva/sclera: Conjunctivae normal.      Pupils: Pupils are equal, round, and reactive to light.   Neck:      Vascular: No carotid bruit.      Comments: No grossly evident JVD but difficult to assess given neck adiposity   Cardiovascular:      Rate and Rhythm: Rhythm irregular.      Pulses: Normal pulses.      Heart sounds: Murmur (heard best at the right upper sternal border) heard.   Pulmonary:      Effort: Pulmonary effort is normal. No respiratory distress.      Breath sounds: No wheezing or rhonchi.      Comments: Nonlabored on 2L via NC, diminished breath sounds throughout all lung fields with very faint crackles in the bases,   Chest:      Chest wall: No tenderness.  "  Abdominal:      Comments: Obese abdomen, unable to fully appreciate for masses/organomegaly due to body habitus, nontender, no guarding, no rebound tenderness, soft, bowel sounds present throughout   Musculoskeletal:      Cervical back: Normal range of motion and neck supple. No rigidity or tenderness.      Comments: Range of motion/strength appears globally diminished at likely a 4 out of 5 in the upper extremity but slightly weaker in the lower extremities and limited by edema and body habitus, he does have notable 2+ pitting edema to just below the knees bilaterally, I did not appreciate any worse swelling on 1 side versus the other in comparison to the ED documentation.   Lymphadenopathy:      Cervical: No cervical adenopathy.   Skin:     General: Skin is warm and dry.      Findings: No bruising, erythema, lesion or rash.   Neurological:      General: No focal deficit present.      Mental Status: He is alert and oriented to person, place, and time.      Comments: Decreased sensation to light touch of bilateral feet, finger-to-nose appears grossly intact, he did attempt to perform heel-to-shin but he stated that it was somewhat difficult because he felt like his lower extremities felt heavy, fluency and speed of speech are improving, he does appropriately interact, he is alert and oriented x 4   Psychiatric:         Mood and Affect: Mood normal.         Behavior: Behavior normal.         Thought Content: Thought content normal.         Judgment: Judgment normal.     Last Recorded Vitals  Blood pressure 96/54, pulse 72, temperature 35.8 °C (96.4 °F), resp. rate 22, height 1.905 m (6' 3\"), weight (!) 153 kg (337 lb 4.9 oz), SpO2 97 %.  Intake/Output last 3 Shifts:  I/O last 3 completed shifts:  In: 3490.8 (22.8 mL/kg) [P.O.:240; I.V.:1690.8 (11.1 mL/kg); IV Piggyback:1560]  Out: 1116 (7.3 mL/kg) [Urine:1116 (0.2 mL/kg/hr)]  Weight: 153 kg     Relevant Results           This patient currently has cardiac telemetry " ordered; if you would like to modify or discontinue the telemetry order, click here to go to the orders activity to modify/discontinue the order.                 Assessment/Plan     New onset atrial fibrillation with rapid ventricular response  -Telemetry monitoring  -TTE w/ RHF  -S/P amiodarone gtt, now on oral load   -Continue digoxin.   -Heparin gtt for stroke ppx  -Cardiology consult appreciated     Strep dysgalactiae/canis bacteremia   -source unclear  -No grossly evident sources of infection on imaging. TTE without vegetations.   -UA negative  -TTE pending  -2/3 Bcx: Strep dysgalactiae/canis   -2/4 Bcx: pending  -Continued on IV vancomycin/Zosyn while awaiting sensitivities  -ID consulting      Vitamin B12 deficiency   -repleting     Acute Metabolic Encephalopathy  -multifactorial 2/2 the above issues, improving   -CT head without contrast noted no acute intracranial processes but there is mention of a left frontal focus of encephalomalacia and mild nonspecific white matter changes present.  -No noted focal neurological deficits on examination, speech is improving   -MRI Brain/MRA head without acute process.     AOC HFpEF   -YANELIS with normal EF and RHF   -Continue IV diuresis   -trend Scr and electrolytes   -Cardiology consulting    Hypervolemic Hyponatremia  -improving with diuresis      COPD with chronic hypoxic respiratory failure no signs of acute distress/exacerbation  -Continued on as needed nebulizer treatments  -Will continue on home Breo     Acute deconditioning/ambulatory dysfunction  -PT/OT appreciated         Waylon De La Fuente MD PhD

## 2024-02-06 NOTE — PROGRESS NOTES
Physical Therapy                 Therapy Communication Note    Patient Name: Johnny Caicedo  MRN: [id number]  Today's Date: 2/6/2024     Discipline: Physical Therapy    Missed Visit Reason: Missed Visit Reason:  (SCREEN/DC, PT. STATES AMB INDEP IN ROOM, HAS NO MOBILTY CONCERNS, RN CONFIRMS INDEP TO BATHROOM PT. WEARS O2 -TAKES OFF TO AMB W/ P.T. AMB 20 FT X2 SITS ON TOILET INDEP TOILETING, MOD SOB W/ AMB SATS POST AMB 97%)    Missed Time:     Comment:HAD ONE NEAR LOB W/ ONE OF HIS TURNS DURING AMB, SELF CORRECTION, ENCOURAGED PT. TO AMB W/ RN EACH TIME SHE COMES IN ROOM TO REGAIN HIS ENDURANCE. HE VERBALIZED UNDERSTANDING THIS INFO RELAYED TO RN, PT. STATES HE IS AT BASELINE AMB AND HAS PLENTY OF A AT HOME, NO CURRENT P.T. NEEDS, DC P.T., RECOMMEND CARDIOPULMONARY REHAB

## 2024-02-07 ENCOUNTER — PHARMACY VISIT (OUTPATIENT)
Dept: PHARMACY | Facility: CLINIC | Age: 64
End: 2024-02-07
Payer: COMMERCIAL

## 2024-02-07 VITALS
OXYGEN SATURATION: 95 % | DIASTOLIC BLOOD PRESSURE: 77 MMHG | WEIGHT: 315 LBS | HEART RATE: 85 BPM | SYSTOLIC BLOOD PRESSURE: 117 MMHG | BODY MASS INDEX: 39.17 KG/M2 | RESPIRATION RATE: 9 BRPM | HEIGHT: 75 IN | TEMPERATURE: 97.2 F

## 2024-02-07 PROBLEM — R78.81 BACTEREMIA: Status: ACTIVE | Noted: 2024-02-07

## 2024-02-07 PROBLEM — E87.6 HYPOKALEMIA: Status: RESOLVED | Noted: 2024-02-04 | Resolved: 2024-02-07

## 2024-02-07 PROBLEM — I48.0 AF (PAROXYSMAL ATRIAL FIBRILLATION) (MULTI): Status: ACTIVE | Noted: 2024-02-07

## 2024-02-07 LAB
ANION GAP SERPL CALC-SCNC: 11 MMOL/L (ref 10–20)
BUN SERPL-MCNC: 19 MG/DL (ref 6–23)
CALCIUM SERPL-MCNC: 8.1 MG/DL (ref 8.6–10.3)
CHLORIDE SERPL-SCNC: 102 MMOL/L (ref 98–107)
CO2 SERPL-SCNC: 27 MMOL/L (ref 21–32)
CREAT SERPL-MCNC: 1.3 MG/DL (ref 0.5–1.3)
EGFRCR SERPLBLD CKD-EPI 2021: 61 ML/MIN/1.73M*2
GLUCOSE SERPL-MCNC: 111 MG/DL (ref 74–99)
MAGNESIUM SERPL-MCNC: 1.86 MG/DL (ref 1.6–2.4)
POTASSIUM SERPL-SCNC: 3.5 MMOL/L (ref 3.5–5.3)
SODIUM SERPL-SCNC: 136 MMOL/L (ref 136–145)
UFH PPP CHRO-ACNC: 0.3 IU/ML

## 2024-02-07 PROCEDURE — 2500000004 HC RX 250 GENERAL PHARMACY W/ HCPCS (ALT 636 FOR OP/ED): Performed by: INTERNAL MEDICINE

## 2024-02-07 PROCEDURE — 80048 BASIC METABOLIC PNL TOTAL CA: CPT | Performed by: INTERNAL MEDICINE

## 2024-02-07 PROCEDURE — 85520 HEPARIN ASSAY: CPT | Performed by: INTERNAL MEDICINE

## 2024-02-07 PROCEDURE — RXMED WILLOW AMBULATORY MEDICATION CHARGE

## 2024-02-07 PROCEDURE — 2500000001 HC RX 250 WO HCPCS SELF ADMINISTERED DRUGS (ALT 637 FOR MEDICARE OP): Performed by: PHYSICIAN ASSISTANT

## 2024-02-07 PROCEDURE — 2500000001 HC RX 250 WO HCPCS SELF ADMINISTERED DRUGS (ALT 637 FOR MEDICARE OP): Performed by: INTERNAL MEDICINE

## 2024-02-07 PROCEDURE — 83735 ASSAY OF MAGNESIUM: CPT | Performed by: INTERNAL MEDICINE

## 2024-02-07 PROCEDURE — 99232 SBSQ HOSP IP/OBS MODERATE 35: CPT | Performed by: PHYSICIAN ASSISTANT

## 2024-02-07 PROCEDURE — 2500000002 HC RX 250 W HCPCS SELF ADMINISTERED DRUGS (ALT 637 FOR MEDICARE OP, ALT 636 FOR OP/ED): Performed by: INTERNAL MEDICINE

## 2024-02-07 PROCEDURE — 99239 HOSP IP/OBS DSCHRG MGMT >30: CPT | Performed by: INTERNAL MEDICINE

## 2024-02-07 PROCEDURE — 36415 COLL VENOUS BLD VENIPUNCTURE: CPT | Performed by: INTERNAL MEDICINE

## 2024-02-07 RX ORDER — LANOLIN ALCOHOL/MO/W.PET/CERES
1000 CREAM (GRAM) TOPICAL DAILY
Qty: 30 TABLET | Refills: 11 | Status: SHIPPED | OUTPATIENT
Start: 2024-02-08 | End: 2024-03-06 | Stop reason: SDUPTHER

## 2024-02-07 RX ORDER — DIGOXIN 125 MCG
125 TABLET ORAL DAILY
Qty: 30 TABLET | Refills: 11 | Status: SHIPPED | OUTPATIENT
Start: 2024-02-07 | End: 2024-03-06 | Stop reason: SDUPTHER

## 2024-02-07 RX ORDER — AMIODARONE HYDROCHLORIDE 200 MG/1
TABLET ORAL
Qty: 56 TABLET | Refills: 11 | Status: SHIPPED | OUTPATIENT
Start: 2024-02-18 | End: 2024-03-06 | Stop reason: SDUPTHER

## 2024-02-07 RX ORDER — FUROSEMIDE 40 MG/1
40 TABLET ORAL DAILY
Qty: 30 TABLET | Refills: 11 | Status: SHIPPED | OUTPATIENT
Start: 2024-02-08 | End: 2024-03-06 | Stop reason: SDUPTHER

## 2024-02-07 RX ORDER — FUROSEMIDE 40 MG/1
40 TABLET ORAL DAILY
Status: DISCONTINUED | OUTPATIENT
Start: 2024-02-07 | End: 2024-02-07 | Stop reason: HOSPADM

## 2024-02-07 RX ORDER — AMOXICILLIN AND CLAVULANATE POTASSIUM 875; 125 MG/1; MG/1
1 TABLET, FILM COATED ORAL 2 TIMES DAILY
Qty: 28 TABLET | Refills: 0 | Status: SHIPPED | OUTPATIENT
Start: 2024-02-07 | End: 2024-02-21

## 2024-02-07 RX ADMIN — APIXABAN 5 MG: 5 TABLET, FILM COATED ORAL at 09:00

## 2024-02-07 RX ADMIN — DIGOXIN 125 MCG: 0.25 INJECTION INTRAMUSCULAR; INTRAVENOUS at 09:00

## 2024-02-07 RX ADMIN — HEPARIN SODIUM 2000 UNITS/HR: 10000 INJECTION, SOLUTION INTRAVENOUS at 07:11

## 2024-02-07 RX ADMIN — AMIODARONE HYDROCHLORIDE 400 MG: 400 TABLET ORAL at 08:59

## 2024-02-07 RX ADMIN — PIPERACILLIN SODIUM AND TAZOBACTAM SODIUM 3.38 G: 3; .375 INJECTION, SOLUTION INTRAVENOUS at 09:58

## 2024-02-07 RX ADMIN — CYANOCOBALAMIN TAB 1000 MCG 1000 MCG: 1000 TAB at 09:00

## 2024-02-07 RX ADMIN — PIPERACILLIN SODIUM AND TAZOBACTAM SODIUM 3.38 G: 3; .375 INJECTION, SOLUTION INTRAVENOUS at 04:18

## 2024-02-07 RX ADMIN — FLUTICASONE FUROATE AND VILANTEROL TRIFENATATE 1 PUFF: 200; 25 POWDER RESPIRATORY (INHALATION) at 09:01

## 2024-02-07 RX ADMIN — FUROSEMIDE 40 MG: 40 TABLET ORAL at 09:00

## 2024-02-07 ASSESSMENT — ENCOUNTER SYMPTOMS
DYSURIA: 0
SHORTNESS OF BREATH: 0
ORTHOPNEA: 0
WHEEZING: 0
DIARRHEA: 0
VOMITING: 0
WEAKNESS: 0
NAUSEA: 0
ABDOMINAL PAIN: 0
FEVER: 0
PALPITATIONS: 0

## 2024-02-07 ASSESSMENT — PAIN - FUNCTIONAL ASSESSMENT: PAIN_FUNCTIONAL_ASSESSMENT: 0-10

## 2024-02-07 ASSESSMENT — PAIN SCALES - GENERAL
PAINLEVEL_OUTOF10: 0 - NO PAIN
PAINLEVEL_OUTOF10: 0 - NO PAIN

## 2024-02-07 NOTE — CARE PLAN
The patient's goals for the shift include      The clinical goals for the shift include maintain therapueutic heparin assay

## 2024-02-07 NOTE — DISCHARGE SUMMARY
DISCHARGE SUMMARY     Discharge Diagnosis  Hypokalemia    This discharge took greater than 35 minutes.    Test Results Pending At Discharge  Pending Labs       Order Current Status    Blood Culture Preliminary result    Blood Culture Preliminary result    Blood Culture Preliminary result            Hospital Course   Johnny Caicedo is a 63 y.o.  male with a past medical history significant for HTN, HLD, COPD, chronic hypoxic respiratory failure on 2 L via NC, morbid obesity, prior tobacco use history having quit about 20 years ago, anxiety, depression and GERD.  The below information was obtained from the very limited records in the EMR.  Patient states that he receives the majority of his care in [place], [place].  He states that he does remember that he does take Breo, as needed albuterol, Lasix, a blood pressure medication as well as venlafaxine.  Patient was brought in by family for increasing bilateral lower extremity swelling, shortness of breath, generalized weakness and confusion.  Patient's family had noted that he had become very sporadically confused with these episodes only lasting about a few seconds.  He did have an episode while in the ED that lasted but a few minutes where at first he was completely not alert and oriented and did not know where he was but then promptly after returning from the CT scanner he was alert and oriented x 4 and appropriately interactive.  The patient states that he has felt generally unwell for about 2 days but noted sudden onset shortness of breath and just prior to presenting to the ED for further evaluation and management.  He denies any recent sick contacts, chemical/environmental exposures, changes in dietary habits or any recent traumatic events/falls other than noted above.  He denies any fevers, chills, night sweats, vision changes, auditory changes, change in taste/smell, loss of bowel/bladder control, loss consciousness, dizziness, vertigo, syncope,  seizure-like activity, chest pain, palpitations, coughing, wheezing, congestion, hemoptysis, hematemesis, abdominal pain, nausea, vomiting, diarrhea, constipation, dysuria, hematuria, dyschezia, hematochezia any lateralizing motor/sensory deficits other than noted above.     ED course:  1.  Vital signs on presentation: Temperature 36.8 °C, heart rate of 82, respirations 24, blood pressure 143/72, pulse ox of 94% initially on 4 L by nasal cannula but was down titrated back to his home O2 demands of 2 L via nasal cannula  2.  Promptly after arrival to the ED he did suddenly become tachycardic and an EKG noted atrial fibrillation with rapid ventricular response with rates as high as 160s  3.  Repeat EKG noted atrial fibrillation with minimal ST segment elevations in the inferior leads, rate of 133, , QRS 92, QTc of 429  4.  Given the patient's labs and unclear presentation or oral follow-up symptomology he was treated for multiple aspects with infection being the running diagnosis per the ED physician.  Patient was initiated on IV antibiotics with vancomycin/Zosyn/azithromycin.  He was also given a total of 7-1/2 mg of IV Lopressor with notable improvement in his rates to the 110s to 120s.  He was also given 2 g of IV magnesium, Toradol.  5.  WBC of 21  6.  Hemoglobin of 14.3  7.  Glucose of 103  8.  Sodium 129  9.  BUNs/creatinine of 11/0.93  10.  Magnesium of 1.33  11.  BNP of 207  12.  D-dimer of 857  13.  Influenza A/B PCR negative, COVID-19 PCR negative  14.  Blood cultures x 2 pending  15.  VBG: pH is 7.45, pCO2 37, pO2 47, SO2 of 77, [place] bicarb of 25.7  16.  XR chest: Narrowing of the trachea suggested stricture/sequela or COPD considerations,  17.  CTA chest: Noted no acute pulmonary emboli or acute cardiopulmonary processes  18.  Lactate of 2 CT head: .5 with a repeat of 1.6  19.  Noted no acute intracranial hemorrhage or mass effect but there is a left frontal focus of encephalomalacia and mild  nonspecific white matter changes  20.  CTA chest/abdomen/pelvis: No aortic aneurysm or dissection, fluid contents within the colon suggestive of infectious versus inflammatory changes, mild mediastinal/retroperitoneal/periportal/inguinal lymphadenopathy possibly reactive, interstitial opacities in the lung parenchyma which may be related to fibrosis/scarring or possible mildly superimposed interstitial edema not entirely excluded, minimal L5-S1 anterolisthesis with chronic appearing pars defects, hepatic steatosis, coronary artery calcifications and additional findings as noted below in the full report.  21.  Troponin level of 11 then 10 then 15  22.  UA was unremarkable for any infectious etiologies  23.  Urine drug screen was unremarkable  24.  Acetaminophen/salicylate/alcohol were within range                A 12 point ROS was performed with the patient denying any complaints at this time aside from those listed in the HPI above.     2/5: His mentation appears to be improving as his speech fluency and speed are improving.      2/6: He is without complaint    New onset atrial fibrillation with rapid ventricular response  -Telemetry monitoring  -TTE w/ RHF  -S/P amiodarone gtt, now on oral load   -Continue digoxin.   -Eliquis for stroke ppx  -Cardiology consult appreciated     Strep dysgalactiae/canis bacteremia   -source unclear  -No grossly evident sources of infection on imaging. TTE without vegetations.   -UA negative  -2/3 Bcx: Strep dysgalactiae/canis   -2/4 Bcx: NGTD  -Per ID recs: Transition to Augmentin with a stop date of 2/21/24   -ID consulting      Vitamin B12 deficiency   -repleting   -repeat levels in a few month      Acute Metabolic Encephalopathy  -multifactorial 2/2 the above issues, improving   -CT head without contrast noted no acute intracranial processes but there is mention of a left frontal focus of encephalomalacia and mild nonspecific white matter changes present.  -No noted focal  neurological deficits on examination, speech is improving   -MRI Brain/MRA head without acute process.     AOC RHF  -YANELIS with normal EF and RHF   -Continue PO diuresis on DC (home lasix dose increased from 20 to 40 mg every day by the cardiology team)   -Cardiology consulting     Hypervolemic Hyponatremia  -improving with diuresis      COPD with chronic hypoxic respiratory failure no signs of acute distress/exacerbation  -Continued on as needed nebulizer treatments  -Will continue on home Breo       Pertinent Physical Exam At Time of Discharge  Constitutional:       General: He is not in acute distress.     Appearance: He is obese. He is not ill-appearing or diaphoretic.   HENT:      Head: Normocephalic and atraumatic.      Mouth/Throat:      Mouth: Mucous membranes are moist.      Pharynx: Oropharynx is clear.   Eyes:      Extraocular Movements: Extraocular movements intact.      Conjunctiva/sclera: Conjunctivae normal.      Pupils: Pupils are equal, round, and reactive to light.   Neck:      Vascular: No carotid bruit.      Comments: No grossly evident JVD but difficult to assess given neck adiposity   Cardiovascular:      Rate and Rhythm: Rhythm irregular.      Pulses: Normal pulses.      Heart sounds: Murmur (heard best at the right upper sternal border) heard.   Pulmonary:      Effort: Pulmonary effort is normal. No respiratory distress.      Breath sounds: No wheezing or rhonchi.      Comments: Nonlabored on 2L via NC, diminished breath sounds throughout all lung fields    Chest:      Chest wall: No tenderness.   Abdominal:      Comments: Obese abdomen, unable to fully appreciate for masses/organomegaly due to body habitus, nontender, no guarding, no rebound tenderness, soft, bowel sounds present throughout   Musculoskeletal:      Cervical back: Normal range of motion and neck supple. No rigidity or tenderness.      Comments: Range of motion/strength appears globally diminished at likely a 4 out of 5 in the  upper extremity but slightly weaker in the lower extremities.  Lymphadenopathy:      Cervical: No cervical adenopathy.   Skin:     General: Skin is warm and dry.      Findings: No bruising, erythema, lesion or rash.   Neurological:      General: No focal deficit present.      Mental Status: He is alert and oriented to person, place, and time.      Comments: Decreased sensation to light touch of bilateral feet, finger-to-nose appears grossly intact, he did attempt to perform heel-to-shin but he stated that it was somewhat difficult because he felt like his lower extremities felt heavy, fluency and speed of speech are improving, he does appropriately interact, he is alert and oriented x 4   Psychiatric:         Mood and Affect: Mood normal.         Behavior: Behavior normal.         Thought Content: Thought content normal.         Judgment: Judgment normal.     Home Medications     Medication List      START taking these medications     amiodarone 200 mg tablet; Commonly known as: Pacerone; From the evening   of 2/7 to the evening of 2/16 take TWO tabs TWICE daily. On the morning of   2/17 switch to ONE tab ONCE daily. Do not start before February 18, 2024.;   Start taking on: February 18, 2024   amoxicillin-pot clavulanate 875-125 mg tablet; Commonly known as:   Augmentin; Take 1 tablet by mouth 2 times a day for 14 days.   apixaban 5 mg tablet; Commonly known as: Eliquis; Take 1 tablet (5 mg)   by mouth every 12 hours.   cyanocobalamin 1,000 mcg tablet; Commonly known as: Vitamin B-12; Take 1   tablet (1,000 mcg) by mouth once daily. Do not start before February 8, 2024.; Start taking on: February 8, 2024   digoxin 125 MCG tablet; Commonly known as: Lanoxin; Take 1 tablet (125   mcg) by mouth once daily.     CHANGE how you take these medications     furosemide 40 mg tablet; Commonly known as: Lasix; Take 1 tablet (40 mg)   by mouth once daily. Do not start before February 8, 2024.; Start taking   on: February 8,  2024; What changed: medication strength, how much to take     CONTINUE taking these medications     albuterol 90 mcg/actuation aerosol powdr breath activated inhaler   atorvastatin 40 mg tablet; Commonly known as: Lipitor   Breo Ellipta 200-25 mcg/dose inhaler; Generic drug: fluticasone   furoate-vilanteroL   montelukast 10 mg tablet; Commonly known as: Singulair   spironolactone 25 mg tablet; Commonly known as: Aldactone   venlafaxine XR 37.5 mg 24 hr capsule; Commonly known as: Effexor-XR       Outpatient Follow-Up  No follow-ups on file.     Waylon De La Fuente MD PhD  2/7/2024  12:54 PM

## 2024-02-07 NOTE — PROGRESS NOTES
Subjective Data:  Johnny Caicedo is a 63 y.o. male with a past medical history significant for HTN, HLD, COPD, chronic hypoxic respiratory failure on 2 L via NC, morbid obesity, prior tobacco use history having quit about 20 years ago, anxiety, depression and GERD.  The below information was obtained from the very limited records in the EMR.  Patient states that he receives the majority of his care in [place], [place].  He states that he does remember that he does take Breo, as needed albuterol, Lasix, a blood pressure medication as well as venlafaxine.  Patient was brought in by family for increasing bilateral lower extremity swelling, shortness of breath, generalized weakness and confusion.  Patient's family had noted that he had become very sporadically confused with these episodes only lasting about a few seconds.  He did have an episode while in the ED that lasted but a few minutes where at first he was completely not alert and oriented and did not know where he was but then promptly after returning from the CT scanner he was alert and oriented x 4 and appropriately interactive.  The patient states that he has felt generally unwell for about 2 days but noted sudden onset shortness of breath and just prior to presenting to the ED for further evaluation and management.  He denies any recent sick contacts, chemical/environmental exposures, changes in dietary habits or any recent traumatic events/falls other than noted above.  He denies any fevers, chills, night sweats, vision changes, auditory changes, change in taste/smell, loss of bowel/bladder control, loss consciousness, dizziness, vertigo, syncope, seizure-like activity, chest pain, palpitations, coughing, wheezing, congestion, hemoptysis, hematemesis, abdominal pain, nausea, vomiting, diarrhea, constipation, dysuria, hematuria, dyschezia, hematochezia any lateralizing motor/sensory deficits other than noted above.   2/5/2024 : Remains in afib. Feels  better  2-7-24: Patient sitting up at edge of bed with no cardiac complaints or concerns.  He states he is bored and would like to be discharged home.  Telemetry shows rate controlled atrial fibrillation.  He has no awareness of the atrial fibrillation.  His respiratory status is at baseline and states that he is on oxygen at home.    Overnight Events:    Remains in afib     Objective Data:  Last Recorded Vitals:  Vitals:    02/06/24 2301 02/07/24 0001 02/07/24 0101 02/07/24 0700   BP: 121/70 124/76 99/72    Pulse: 85 89 85    Resp:       Temp:    36.2 °C (97.2 °F)   TempSrc:       SpO2:       Weight:       Height:           Last Labs:  CBC - 2/6/2024:  4:33 AM  10.0 11.9 208    38.0      CMP - 2/7/2024:  4:17 AM  8.1 8.1 16 --- 0.6   4.5 3.1 18 53      PTT - No results in last year.  _   _ _     TROPHS   Date/Time Value Ref Range Status   02/04/2024 01:55 AM 15 0 - 20 ng/L Final   02/03/2024 10:57 PM 12 0 - 20 ng/L Final   02/03/2024 08:40 PM 11 0 - 20 ng/L Final     BNP   Date/Time Value Ref Range Status   02/03/2024 08:40  0 - 99 pg/mL Final     HGBA1C   Date/Time Value Ref Range Status   02/04/2024 05:35 AM 6.4 see below % Final     LDLCALC   Date/Time Value Ref Range Status   02/04/2024 05:35 AM 45 <=99 mg/dL Final     Comment:                                 Near   Borderline      AGE      Desirable  Optimal    High     High     Very High     0-19 Y     0 - 109     ---    110-129   >/= 130     ----    20-24 Y     0 - 119     ---    120-159   >/= 160     ----      >24 Y     0 -  99   100-129  130-159   160-189     >/=190       VLDL   Date/Time Value Ref Range Status   02/04/2024 05:35 AM 12 0 - 40 mg/dL Final      Last I/O:  I/O last 3 completed shifts:  In: 2500.8 (16.3 mL/kg) [I.V.:1040.8 (6.8 mL/kg); IV Piggyback:1460]  Out: - (0 mL/kg)   Weight: 153 kg     Past Cardiology Tests (Last 3 Years):    Echo: CONCLUSIONS:   1. Left ventricular systolic function is normal with a 60-65% estimated ejection  fraction.   2. There is moderate concentric left ventricular hypertrophy.   3. There is reduced right ventricular systolic function.   4. Aortic valve stenosis is not present.   5. There is moderate dilatation of the aortic root.       Ejection Fractions:  EF   Date/Time Value Ref Range Status   02/05/2024 10:11 AM 64 %        Carotid Imaging:  Right Carotid: Findings are consistent with less than 50% stenosis of the right proximal internal carotid artery. Right external carotid artery appears patent with no evidence of stenosis. The right vertebral artery is patent with antegrade flow. No evidence of hemodynamically significant stenosis in the right subclavian artery.  Left Carotid: Findings are consistent with less than 50% stenosis of the left proximal internal carotid artery. Left external carotid artery appears patent with no evidence of stenosis. The left vertebral artery is patent with antegrade flow. No evidence of hemodynamically significant stenosis in the left subclavian artery.  No results found for this or any previous visit from the past 1095 days.      Inpatient Medications:  Scheduled medications   Medication Dose Route Frequency    amiodarone  400 mg oral BID    Followed by    [START ON 2/18/2024] amiodarone  200 mg oral Daily    cyanocobalamin  1,000 mcg oral Daily    digoxin  250 mcg intravenous Once    Followed by    digoxin  125 mcg intravenous Daily    fluticasone furoate-vilanteroL  1 puff inhalation Daily    furosemide  40 mg intravenous Daily    iohexol  150 mL intravenous Once in imaging    perflutren lipid microspheres  0.5-10 mL of dilution intravenous Once in imaging    perflutren protein A microsphere  0.5 mL intravenous Once in imaging    piperacillin-tazobactam  3.375 g intravenous q6h    sulfur hexafluoride microsphr  2 mL intravenous Once in imaging     Review of Systems   Constitutional: Negative for fever and malaise/fatigue.        Being bored is his biggest complaint    Cardiovascular:  Negative for chest pain, orthopnea and palpitations.        Not aware of the AF   Respiratory:  Negative for shortness of breath and wheezing.    Skin:  Negative for itching and rash.   Gastrointestinal:  Negative for abdominal pain, diarrhea, nausea and vomiting.   Genitourinary:  Negative for dysuria.   Neurological:  Negative for weakness.     Physical Exam  Constitutional:       General: He is not in acute distress.     Appearance: Normal appearance. He is obese.   HENT:      Mouth/Throat:      Mouth: Mucous membranes are moist.   Neck:      Comments: No obvious JVD and no bruits    Cardiovascular:      Rate and Rhythm: Normal rate. Rhythm irregular.      Heart sounds: Normal heart sounds. No murmur heard.  Pulmonary:      Effort: Pulmonary effort is normal.      Breath sounds: Normal breath sounds.   Abdominal:      General: Abdomen is flat. Bowel sounds are normal.      Palpations: Abdomen is soft.      Tenderness: There is no abdominal tenderness.   Musculoskeletal:         General: Swelling (mild;  chronic skin changes) present.   Skin:     General: Skin is warm and dry.   Neurological:      Mental Status: He is alert and oriented to person, place, and time.   Psychiatric:         Mood and Affect: Mood normal.          Assessment/Plan   1) Atrial Fib with RVR  Start IV Amio with relative hypotension  Dig loading  IV Heparin  Echo  2/5/2024:  Check dig level in am  Continue IV amio/Heparin  Review echo  2-7-24: Patient remains in atrial fibrillation but heart rates are controlled.  He remains on heparin infusion.  His echocardiogram shows normal LV systolic function.  If no invasive testing or procedures necessary likely needs transition to oral Eliquis 5 mg twice daily.  Digoxin level 0.48.  Will continue loading amiodarone and will likely attempt cardioversion as outpatient.     2) Acute diastolic heart failure  IV lasix  Echo  2-7-24: Again echo with normal LV systolic function.  He does  have very mild lower extremity edema and is on IV Lasix.  Creatinine is crept up ever so slightly and he has no shortness of breath.  Will transition to oral diuretic at this time.    Recommendations--transition to oral diuretic and oral anticoagulant.  No further cardiac testing planned at this time.  Continue loading amiodarone and will likely do outpatient cardioversion.          Abdi Deluna PA-C  2/7/2024  8:40 AM

## 2024-02-07 NOTE — PROGRESS NOTES
Johnny Caicedo is a 63 y.o. male on day 3 of admission presenting with Hypokalemia.    Subjective   Interval History: No new complaints     Review of Systems    As above    Objective   Range of Vitals (last 24 hours)  Heart Rate:  []   Temp:  [36 °C (96.8 °F)-36.2 °C (97.2 °F)]   BP: ()/(50-83)   Weight:  [153 kg (337 lb 4.9 oz)]   Daily Weight  02/06/24 : (!) 153 kg (337 lb 4.9 oz)    Body mass index is 42.16 kg/m².    Physical Exam    General: AAO*3  Skin: no rashes  Neck: supple  CVS: S1S2  Chest:CTAB  GI: soft, non tender  : no CVAT  Psych: alert,oriented  CNS: no FND      Antibiotics  sodium chloride 0.9 % bolus 500 mL  piperacillin-tazobactam-dextrose (Zosyn) IV 4.5 g  azithromycin (Zithromax) in dextrose 5 % in water (D5W) 250 mL  mg  vancomycin (Vancocin) 2 g in dextrose 5 % in water (D5W) 500 mL IV  iohexol (OMNIPaque) 350 mg iodine/mL solution 75 mL  ketorolac (Toradol) injection 15 mg  iohexol (OMNIPaque) 350 mg iodine/mL solution 150 mL  metoprolol tartrate (Lopressor) injection 2.5 mg  magnesium sulfate IV 2 g  acetaminophen (Tylenol) tablet 650 mg  metoprolol tartrate (Lopressor) injection 5 mg  acetaminophen (Tylenol) tablet 650 mg  ondansetron (Zofran) injection 4 mg  piperacillin-tazobactam-dextrose (Zosyn) IV 3.375 g  oxygen (O2) therapy  perflutren lipid microspheres (Definity) injection 0.5-10 mL of dilution  sulfur hexafluoride microsphr (Lumason) injection 24.28 mg  perflutren protein A microsphere (Optison) injection 0.5 mL  vancomycin (Vancocin) 1,000 mg in dextrose 5% water 200 mL  heparin (porcine) injection 7,500 Units  metoprolol tartrate (Lopressor) injection 5 mg  ipratropium-albuteroL (Duo-Neb) 0.5-2.5 mg/3 mL nebulizer solution 3 mL  furosemide (Lasix) injection 20 mg  fluticasone furoate-vilanteroL (Breo Ellipta) 200-25 mcg/dose inhaler 1 puff  amiodarone (Nexterone) 150 mg in dextrose,iso-osm 100 mL (1.5 mg/mL) IV (premix)  amiodarone (Nexterone) 360 mg in  dextrose,iso-osm 200 mL (1.8 mg/mL) infusion (premix)  heparin (porcine) injection 10,000 Units  heparin 25,000 Units in dextrose 5% 250 mL (100 Units/mL) infusion (premix)  heparin (porcine) injection 5,000-10,000 Units  digoxin (Lanoxin) injection 500 mcg  digoxin (Lanoxin) injection 250 mcg  digoxin (Lanoxin) injection 125 mcg  furosemide (Lasix) injection 40 mg  cyanocobalamin (Vitamin B-12) tablet 1,000 mcg  atorvastatin (Lipitor) tablet  venlafaxine (Effexor-XR) 24 hr capsule  spironolactone (Aldactone) tablet  furosemide (Lasix) tablet      montelukast (Singulair) tablet  vancomycin (Vancocin) 1,500 mg in dextrose 5 % in water (D5W) 500 mL IV  amiodarone (Pacerone) tablet 400 mg  amiodarone (Pacerone) tablet 200 mg      Relevant Results  Labs  Results from last 72 hours   Lab Units 02/06/24  0433 02/05/24  0457   WBC AUTO x10*3/uL 10.0 12.6*   HEMOGLOBIN g/dL 11.9* 11.4*   HEMATOCRIT % 38.0* 36.3*   PLATELETS AUTO x10*3/uL 208 177       Results from last 72 hours   Lab Units 02/07/24  0417 02/06/24  0433 02/05/24  0457   SODIUM mmol/L 136 136 134*   POTASSIUM mmol/L 3.5 3.7 3.9   CHLORIDE mmol/L 102 101 99   CO2 mmol/L 27 27 26   BUN mg/dL 19 14 12   CREATININE mg/dL 1.30 1.05 0.89   GLUCOSE mg/dL 111* 99 103*   CALCIUM mg/dL 8.1* 8.0* 7.2*   ANION GAP mmol/L 11 12 13   EGFR mL/min/1.73m*2 61 79 >90             Estimated Creatinine Clearance: 90.9 mL/min (by C-G formula based on SCr of 1.3 mg/dL).  C-Reactive Protein   Date Value Ref Range Status   02/04/2024 14.81 (H) <1.00 mg/dL Final     Microbiology  Susceptibility data from last 14 days.  Collected Specimen Info Organism Clindamycin Erythromycin Penicillin Tetracycline   02/03/24 Blood culture from Peripheral Venipuncture Streptococcus dysgalactiae/canis S S S S   02/03/24 Blood culture from Peripheral Venipuncture Streptococcus dysgalactiae/canis           Assessment:  Sepsis   Strep canis bacteremia   New onset a fib  Chronic hypoxic respiratory  failure on 2 L via NC  Morbid obesity   Depression   Anxiety   GERD  New onset atrial fibrillation   CVA  COPD  HTN  HLD     Suggest:  Blood cx strep dysgalactiae  Repeat blood cx ngtd  TTE negative  MRI - no acute infarct  Nasal mrsa negative  Chest x ray no consolidation   C/w zosyn   Trend wbc and temps     Discharge planning: Augmentin 875 q12 until 2/21/24      Bradford Bernard MD

## 2024-02-08 ENCOUNTER — PATIENT OUTREACH (OUTPATIENT)
Dept: CARE COORDINATION | Facility: CLINIC | Age: 64
End: 2024-02-08
Payer: COMMERCIAL

## 2024-02-08 DIAGNOSIS — I48.0 AF (PAROXYSMAL ATRIAL FIBRILLATION) (MULTI): Primary | ICD-10-CM

## 2024-02-08 DIAGNOSIS — E66.01 MORBID OBESITY (MULTI): ICD-10-CM

## 2024-02-08 DIAGNOSIS — J96.10 CHRONIC RESPIRATORY FAILURE, UNSPECIFIED WHETHER WITH HYPOXIA OR HYPERCAPNIA (MULTI): ICD-10-CM

## 2024-02-08 DIAGNOSIS — I10 PRIMARY HYPERTENSION: ICD-10-CM

## 2024-02-08 LAB
ATRIAL RATE: 278 BPM
ATRIAL RATE: 300 BPM
BACTERIA BLD AEROBE CULT: ABNORMAL
BACTERIA BLD CULT: ABNORMAL
GRAM STN SPEC: ABNORMAL
P AXIS: -85 DEGREES
P AXIS: 98 DEGREES
PR INTERVAL: 113 MS
PR INTERVAL: 170 MS
Q ONSET: 251 MS
Q ONSET: 252 MS
QRS COUNT: 22 BEATS
QRS COUNT: 27 BEATS
QRS DURATION: 82 MS
QRS DURATION: 92 MS
QT INTERVAL: 267 MS
QT INTERVAL: 288 MS
QTC CALCULATION(BAZETT): 429 MS
QTC CALCULATION(BAZETT): 443 MS
QTC FREDERICIA: 374 MS
QTC FREDERICIA: 375 MS
R AXIS: 43 DEGREES
R AXIS: 49 DEGREES
T AXIS: 37 DEGREES
T AXIS: 52 DEGREES
T OFFSET: 385 MS
T OFFSET: 395 MS
VENTRICULAR RATE: 133 BPM
VENTRICULAR RATE: 165 BPM

## 2024-02-08 NOTE — PROGRESS NOTES
Discharge Facility: [facility]  Discharge Diagnosis: Hypokalemia   Admission Date: 2-3-24   Discharge Date: 2-8-24     PCP Appointment Date: 3-6-24 (New Patient)   Specialist Appointment Date:  Unknown   Hospital Encounter and Summary: Linked   See discharge assessment below for further details    Engagement  Call Start Time: 1115 (2/8/2024 11:18 AM)    Medications  Medications reviewed with patient/caregiver?: Yes (2/8/2024 11:18 AM)  Is the patient having any side effects they believe may be caused by any medication additions or changes?: No (2/8/2024 11:18 AM)  Does the patient have all medications ordered at discharge?: Yes (2/8/2024 11:18 AM)  Care Management Interventions: No intervention needed (2/8/2024 11:18 AM)  Prescription Comments: START taking:  amiodarone (Pacerone)   Start taking on: February 18, 2024  amoxicillin-pot clavulanate (Augmentin)   apixaban (Eliquis)   cyanocobalamin (Vitamin B-12)   Start taking on: February 8, 2024  digoxin (Lanoxin)    CHANGE how you take:  furosemide (Lasix) (2/8/2024 11:18 AM)    Appointments  Does the patient have a primary care provider?: Yes (2/8/2024 11:18 AM)  Care Management Interventions: Advised patient to make appointment (2/8/2024 11:18 AM)  Has the patient kept scheduled appointments due by today?: Not applicable (2/8/2024 11:18 AM)    Self Management  Has home health visited the patient within 72 hours of discharge?: Not applicable (2/8/2024 11:18 AM)    Patient Teaching  Does the patient have access to their discharge instructions?: Yes (2/8/2024 11:18 AM)  Care Management Interventions: Reviewed instructions with patient (2/8/2024 11:18 AM)  What is the patient's perception of their health status since discharge?: Improving (2/8/2024 11:18 AM)  Is the patient/caregiver able to teach back the hierarchy of who to call/visit for symptoms/problems? PCP, Specialist, Home Health nurse, Urgent Care, ED, 911: Yes (2/8/2024 11:18 AM)      Elayne Martinez  LPN

## 2024-02-09 LAB
BACTERIA BLD CULT: NORMAL
BACTERIA BLD CULT: NORMAL

## 2024-02-15 NOTE — DOCUMENTATION CLARIFICATION NOTE
"    PATIENT:               ELVIS MONTERO  ACCT #:                  [id number]  MRN:                       [id number]  :                       1960  ADMIT DATE:       2/3/2024 8:25 PM  DISCH DATE:        2024 4:49 PM  RESPONDING PROVIDER #:        [id number]          PROVIDER RESPONSE TEXT:    Sepsis with associated acute multi-system organ dysfunction of DEE, new onset afib, acute metabolic encephalopathy    CDI QUERY TEXT:    [facility]_Sepsis Link Organ Dysfunction      Instruction:    Based on your assessment of the patient and the clinical information, please provide the requested documentation by clicking on the appropriate radio button and enter any additional information if prompted.    Question: Please further clarify if a relationship exists between the Sepsis and acute organ dysfunction    When answering this query, please exercise your independent professional judgment. The fact that a question is being asked, does not imply that any particular answer is desired or expected.    The patient's clinical indicators include:  Clinical Information: 62 yo male admitted with new onset afib, leukocytosis, acute encephalopathy, hyponatremia.    Clinical Indicators:  Vitals (2238) Temp-37.8 HR-135  RR-28  BP-140/70  SPO2-98 4L    WBC: 21.0 (2/3 2040), 16.1 (535), 12.6 (457), 10.0 (433)  Neutrophils absolute: 19.04 (2/3 2040)  Creatinine: 0.93 (2/3 2040), 1.01 (535), 0.89 (457), 1.05 (433), 1.30 (417)  CRP: 14.81 ()  BNP:207 (2/3)  Blood cultures: streptococcus dysgalactiae/canis (2/3 2100)     Dr. De La Fuente PN: \"Patient's family had noted that he had become very sporadically confused with these episodes only lasting about a few seconds. He did have an episode while in the ED that lasted but a few minutes where at first he was completely not alert and oriented and did not know where he was but then promptly after returning from the CT scanner he was alert and oriented x 4 and " "appropriately interactive.\"    2/7 DCN Dr. De La Fuente: \"Acute Metabolic Encephalopathy-multifactorial 2/2 the above issues, improving \"    2/4 Consult Dr. Montenegro: \"Afib causing encephalopathy, ? Possible stroke related to afib causing encephalopathy, infection (? where)\"    Treatment:  IV antibiotics with vancomycin/Zosyn/azithromycin, NS bolus 500 ml    Risk Factors: SIRS, bacteremia, DEE, acute encephalopathy  Options provided:  -- Sepsis with associated acute multi-system organ dysfunction of DEE, new onset afib, acute metabolic encephalopathy  -- Sepsis with other associated acute organ dysfunction, Please specify additional information below  -- Other - I will add my own diagnosis  -- Refer to Clinical Documentation Reviewer    Query created by: Joyce Fatima on 2/13/2024 12:26 PM      Electronically signed by:  KANIKA DE LA FUENTE MD PhD 2/15/2024 8:16 AM          "

## 2024-02-21 ENCOUNTER — TELEMEDICINE (OUTPATIENT)
Dept: PHARMACY | Facility: HOSPITAL | Age: 64
End: 2024-02-21
Payer: COMMERCIAL

## 2024-02-21 DIAGNOSIS — I48.0 AF (PAROXYSMAL ATRIAL FIBRILLATION) (MULTI): ICD-10-CM

## 2024-02-21 DIAGNOSIS — E66.01 MORBID OBESITY (MULTI): ICD-10-CM

## 2024-02-21 DIAGNOSIS — J96.10 CHRONIC RESPIRATORY FAILURE, UNSPECIFIED WHETHER WITH HYPOXIA OR HYPERCAPNIA (MULTI): ICD-10-CM

## 2024-02-21 DIAGNOSIS — I10 PRIMARY HYPERTENSION: ICD-10-CM

## 2024-02-22 ENCOUNTER — PATIENT OUTREACH (OUTPATIENT)
Dept: CARE COORDINATION | Facility: CLINIC | Age: 64
End: 2024-02-22
Payer: COMMERCIAL

## 2024-02-22 NOTE — PROGRESS NOTES
Unable to reach patient for call back after patient's follow up appointment with PCP.   LVM with call back number for patient to call if needed   If no voicemail available call attempts x 2 were made to contact the patient to assist with any questions or concerns patient may have.    Elayne Martinez LPN

## 2024-02-22 NOTE — PROGRESS NOTES
Transitional Care Management: Pharmacy    Subjective   Johnny Caicedo is a 63 y.o. male who was referred to Clinical Pharmacy Team to complete TCM medication reconciliation prior to office visit with Gwendolyn Bowling MD. A comprehensive medication review was completed with the patient. patient had all medications and/or an updated medication list in front of them during the telephone encounter.     Admission Date:2/3/24  Discharge Date: 2/7/24    Notable medication changes following discharge:  START:Amiodarone (currently 200 mg daily); Digoxin 0.125 mg once daily; Eliquis 5 mg BID;  STOP:Aspirin 81 mg (unclear if stopped intentionally during admission)  CHANGE: Furosemide 40 mg once daily in the am    No Known Allergies    [facility]  [address] [address]  Phone: [phone] Fax: [phone]       No issues reported in regards to accessibility affordability adherence adverse effects organization.      Objective     LAB  There were no vitals taken for this visit.     Lab Results   Component Value Date    BILITOT 0.6 02/03/2024    CALCIUM 8.1 (L) 02/07/2024    CO2 27 02/07/2024     02/07/2024    CREATININE 1.30 02/07/2024    GLUCOSE 111 (H) 02/07/2024    ALKPHOS 53 02/03/2024    K 3.5 02/07/2024    PROT 8.1 02/03/2024     02/07/2024    AST 16 02/03/2024    ALT 18 02/03/2024    BUN 19 02/07/2024    ANIONGAP 11 02/07/2024    MG 1.86 02/07/2024    PHOS 4.5 02/04/2024    ALBUMIN 3.1 (L) 02/04/2024    LIPASE 9 02/03/2024     Lab Results   Component Value Date    TRIG 60 02/04/2024    CHOL 86 02/04/2024    LDLCALC 45 02/04/2024    HDL 28.8 02/04/2024     Lab Results   Component Value Date    HGBA1C 6.4 (H) 02/04/2024       Assessment/Plan    Comments/Recommendations to PCP:  - Patient reports currently using Breo inhaler for COPD, but notes previous therapy with Trelegy which provided more perceived benefit. May recommend switching back to Trelegy 200 mcg-62.5 mcg-25 mcg 1  puff daily. Patient also notes difficulty breathing while lying down on back that improves with positional changes.  Patient notes currently prescribed albuterol inhaler as needed, but mentions previously using albuterol nebulizer solution to better effect back in [place]. Recommend potential addition of albuterol nebulizer solution (pt does have nebulizer at home).  - Recommend follow up referral to cardiology to discuss choice of amiodarone + digoxin vs other afib treatments. Digoxin not typically first line therapy given narrow therapeutic index.      Continue all meds under the continuation of care with the referring provider and clinical pharmacy team.    Joe Canales, PharmD    Verbal consent to manage patient's drug therapy was obtained from the patient and/or an individual authorized to act on behalf of a patient. They were informed they may decline to participate or withdraw from participation in pharmacy services at any time.

## 2024-02-28 ENCOUNTER — TELEPHONE (OUTPATIENT)
Dept: PRIMARY CARE | Facility: CLINIC | Age: 64
End: 2024-02-28
Payer: COMMERCIAL

## 2024-02-28 NOTE — TELEPHONE ENCOUNTER
----- Message from Raine Nicole LPN sent at 2/28/2024  8:47 AM EST -----  Regarding: FW: [facility] d/c    ----- Message -----  From: Ellen Nicolas CMA  Sent: 2/8/2024  11:24 AM EST  To: Radha Núñez MA  Subject: FW: [facility] d/                               ----- Message -----  From: Elayne Martinez LPN  Sent: 2/8/2024  11:23 AM EST  To: Do Crowder[address] [place] [address] Clinical Support Staff  Subject: [facility] d/c                                 Discharge Facility: [facility]   Discharge Diagnosis: Hypokalemia   Admission Date: 2-3-24   Discharge Date: 2-8-24     PCP Appointment Date: 3-6-24 (New Patient)       No PCP appointments available within 14 days of discharge.   Please reach out to patient and schedule an appointment within 7-13 days from discharge date.      Pt. Is new, I didn't want to touch the scheduling for hospital discharge and new patient.     Thank you,  Elayne Martinez LPN

## 2024-03-06 ENCOUNTER — OFFICE VISIT (OUTPATIENT)
Dept: PRIMARY CARE | Facility: CLINIC | Age: 64
End: 2024-03-06
Payer: COMMERCIAL

## 2024-03-06 VITALS
RESPIRATION RATE: 21 BRPM | BODY MASS INDEX: 39.17 KG/M2 | WEIGHT: 315 LBS | HEART RATE: 92 BPM | SYSTOLIC BLOOD PRESSURE: 158 MMHG | DIASTOLIC BLOOD PRESSURE: 80 MMHG | HEIGHT: 75 IN | OXYGEN SATURATION: 95 %

## 2024-03-06 DIAGNOSIS — E53.8 B12 DEFICIENCY: ICD-10-CM

## 2024-03-06 DIAGNOSIS — Z00.00 ROUTINE GENERAL MEDICAL EXAMINATION AT HEALTH CARE FACILITY: Primary | ICD-10-CM

## 2024-03-06 DIAGNOSIS — N40.1 NOCTURIA ASSOCIATED WITH BENIGN PROSTATIC HYPERPLASIA: ICD-10-CM

## 2024-03-06 DIAGNOSIS — E87.1 HYPONATREMIA: ICD-10-CM

## 2024-03-06 DIAGNOSIS — R09.02 HYPOXIA: ICD-10-CM

## 2024-03-06 DIAGNOSIS — T46.2X5S ADVERSE EFFECT OF AMIODARONE, SEQUELA: ICD-10-CM

## 2024-03-06 DIAGNOSIS — I50.812 CHRONIC RIGHT-SIDED HEART FAILURE (MULTI): ICD-10-CM

## 2024-03-06 DIAGNOSIS — Z99.81 DEPENDENCE ON CONTINUOUS SUPPLEMENTAL OXYGEN: ICD-10-CM

## 2024-03-06 DIAGNOSIS — I48.0 AF (PAROXYSMAL ATRIAL FIBRILLATION) (MULTI): ICD-10-CM

## 2024-03-06 DIAGNOSIS — Z00.00 MEDICARE ANNUAL WELLNESS VISIT, SUBSEQUENT: ICD-10-CM

## 2024-03-06 DIAGNOSIS — Z87.898 HISTORY OF NOCTURIA: ICD-10-CM

## 2024-03-06 DIAGNOSIS — F41.9 ANXIETY: ICD-10-CM

## 2024-03-06 DIAGNOSIS — E66.2 EXTREME OBESITY WITH ALVEOLAR HYPOVENTILATION (MULTI): ICD-10-CM

## 2024-03-06 DIAGNOSIS — J43.9 PULMONARY EMPHYSEMA, UNSPECIFIED EMPHYSEMA TYPE (MULTI): ICD-10-CM

## 2024-03-06 DIAGNOSIS — R35.1 NOCTURIA ASSOCIATED WITH BENIGN PROSTATIC HYPERPLASIA: ICD-10-CM

## 2024-03-06 DIAGNOSIS — I10 PRIMARY HYPERTENSION: ICD-10-CM

## 2024-03-06 DIAGNOSIS — E78.2 MIXED HYPERLIPIDEMIA: ICD-10-CM

## 2024-03-06 PROBLEM — E66.813 CLASS 3 SEVERE OBESITY DUE TO EXCESS CALORIES WITH SERIOUS COMORBIDITY AND BODY MASS INDEX (BMI) OF 40.0 TO 44.9 IN ADULT: Status: ACTIVE | Noted: 2024-03-06

## 2024-03-06 PROBLEM — E66.01 CLASS 3 SEVERE OBESITY DUE TO EXCESS CALORIES WITH SERIOUS COMORBIDITY AND BODY MASS INDEX (BMI) OF 40.0 TO 44.9 IN ADULT (MULTI): Status: ACTIVE | Noted: 2024-03-06

## 2024-03-06 PROBLEM — I50.810 RIGHT-SIDED HEART FAILURE (MULTI): Status: ACTIVE | Noted: 2024-03-06

## 2024-03-06 PROCEDURE — 99205 OFFICE O/P NEW HI 60 MIN: CPT | Performed by: FAMILY MEDICINE

## 2024-03-06 PROCEDURE — 1036F TOBACCO NON-USER: CPT | Performed by: FAMILY MEDICINE

## 2024-03-06 PROCEDURE — G0439 PPPS, SUBSEQ VISIT: HCPCS | Performed by: FAMILY MEDICINE

## 2024-03-06 PROCEDURE — 3079F DIAST BP 80-89 MM HG: CPT | Performed by: FAMILY MEDICINE

## 2024-03-06 PROCEDURE — 3077F SYST BP >= 140 MM HG: CPT | Performed by: FAMILY MEDICINE

## 2024-03-06 RX ORDER — SPIRONOLACTONE 25 MG/1
25 TABLET ORAL DAILY
Qty: 90 TABLET | Refills: 3 | Status: SHIPPED | OUTPATIENT
Start: 2024-03-06 | End: 2025-03-06

## 2024-03-06 RX ORDER — FUROSEMIDE 40 MG/1
40 TABLET ORAL DAILY
Qty: 90 TABLET | Refills: 3 | Status: SHIPPED | OUTPATIENT
Start: 2024-03-06 | End: 2025-03-06

## 2024-03-06 RX ORDER — FLUTICASONE FUROATE AND VILANTEROL 200; 25 UG/1; UG/1
1 POWDER RESPIRATORY (INHALATION) DAILY
Qty: 28 EACH | Refills: 11 | Status: SHIPPED | OUTPATIENT
Start: 2024-03-06

## 2024-03-06 RX ORDER — ALBUTEROL SULFATE 90 UG/1
2 AEROSOL, METERED RESPIRATORY (INHALATION)
Qty: 18 G | Refills: 11 | Status: SHIPPED | OUTPATIENT
Start: 2024-03-06 | End: 2024-06-06 | Stop reason: WASHOUT

## 2024-03-06 RX ORDER — VENLAFAXINE HYDROCHLORIDE 37.5 MG/1
37.5 CAPSULE, EXTENDED RELEASE ORAL DAILY
Qty: 90 CAPSULE | Refills: 3 | Status: SHIPPED | OUTPATIENT
Start: 2024-03-06 | End: 2025-03-06

## 2024-03-06 RX ORDER — IPRATROPIUM BROMIDE AND ALBUTEROL SULFATE 2.5; .5 MG/3ML; MG/3ML
3 SOLUTION RESPIRATORY (INHALATION) 4 TIMES DAILY PRN
Qty: 120 ML | Refills: 11 | Status: SHIPPED | OUTPATIENT
Start: 2024-03-06 | End: 2025-03-06

## 2024-03-06 RX ORDER — DIGOXIN 125 MCG
125 TABLET ORAL DAILY
Qty: 90 TABLET | Refills: 3 | Status: SHIPPED | OUTPATIENT
Start: 2024-03-06 | End: 2025-03-06

## 2024-03-06 RX ORDER — ATORVASTATIN CALCIUM 40 MG/1
40 TABLET, FILM COATED ORAL DAILY
Qty: 90 TABLET | Refills: 3 | Status: SHIPPED | OUTPATIENT
Start: 2024-03-06 | End: 2025-03-06

## 2024-03-06 RX ORDER — AMIODARONE HYDROCHLORIDE 200 MG/1
200 TABLET ORAL DAILY
Qty: 90 TABLET | Refills: 3 | Status: SHIPPED | OUTPATIENT
Start: 2024-03-06 | End: 2025-03-06

## 2024-03-06 RX ORDER — LANOLIN ALCOHOL/MO/W.PET/CERES
1000 CREAM (GRAM) TOPICAL DAILY
Qty: 30 TABLET | Refills: 11 | Status: SHIPPED | OUTPATIENT
Start: 2024-03-06 | End: 2024-06-06 | Stop reason: WASHOUT

## 2024-03-06 ASSESSMENT — COLUMBIA-SUICIDE SEVERITY RATING SCALE - C-SSRS
2. HAVE YOU ACTUALLY HAD ANY THOUGHTS OF KILLING YOURSELF?: NO
6. HAVE YOU EVER DONE ANYTHING, STARTED TO DO ANYTHING, OR PREPARED TO DO ANYTHING TO END YOUR LIFE?: NO
1. IN THE PAST MONTH, HAVE YOU WISHED YOU WERE DEAD OR WISHED YOU COULD GO TO SLEEP AND NOT WAKE UP?: NO

## 2024-03-06 ASSESSMENT — PATIENT HEALTH QUESTIONNAIRE - PHQ9
SUM OF ALL RESPONSES TO PHQ9 QUESTIONS 1 AND 2: 0
2. FEELING DOWN, DEPRESSED OR HOPELESS: NOT AT ALL
1. LITTLE INTEREST OR PLEASURE IN DOING THINGS: NOT AT ALL

## 2024-03-06 ASSESSMENT — ENCOUNTER SYMPTOMS
DEPRESSION: 0
FATIGUE: 1
LOSS OF SENSATION IN FEET: 0
OCCASIONAL FEELINGS OF UNSTEADINESS: 0
SHORTNESS OF BREATH: 1
DYSURIA: 1

## 2024-03-06 NOTE — PROGRESS NOTES
"Subjective   Reason for Visit: Johnny Caicedo is an 63 y.o. male here for a Medicare Wellness visit.               HPI  63 year old male to [facility], He moved from [place] one year ago. [place], did not get around to finding PCP. He went to [place], then joined [facility], [facility],   then remarried, in [place], had 28 years of marriage before wife passed away. COPD 2L Oxygen, Heart issues, he was seen every three months in [place]    Patient Care Team:  Gwendolyn Bowling MD as PCP - General (Family Medicine)  Elayne Martinez LPN as Care Manager (Case Management)     Review of Systems   Constitutional:  Positive for fatigue.   Respiratory:  Positive for shortness of breath.    Genitourinary:  Positive for dysuria.   All other systems reviewed and are negative.      Objective   Vitals:  /80   Pulse 92   Resp 21   Ht 1.905 m (6' 3\")   Wt (!) 156 kg (344 lb 12.8 oz)   SpO2 95%   BMI 43.10 kg/m²       Physical Exam  Vitals reviewed.   Constitutional:       Appearance: Normal appearance. He is obese.   HENT:      Head: Normocephalic and atraumatic.      Right Ear: Tympanic membrane normal.      Left Ear: Tympanic membrane normal.      Nose: Nose normal.      Mouth/Throat:      Mouth: Mucous membranes are moist.      Pharynx: Oropharynx is clear.   Eyes:      Extraocular Movements: Extraocular movements intact.      Conjunctiva/sclera: Conjunctivae normal.      Pupils: Pupils are equal, round, and reactive to light.   Cardiovascular:      Rate and Rhythm: Normal rate and regular rhythm.      Pulses: Normal pulses.      Heart sounds: Normal heart sounds.   Pulmonary:      Effort: Pulmonary effort is normal.      Breath sounds: Normal breath sounds.      Comments: Slightly decreased breath sounds  Abdominal:      General: Abdomen is flat. Bowel sounds are normal.      Palpations: Abdomen is soft.   Musculoskeletal:         General: Normal range of motion.      Cervical back: Normal range of motion and " neck supple.   Skin:     General: Skin is warm and dry.      Capillary Refill: Capillary refill takes less than 2 seconds.   Neurological:      General: No focal deficit present.      Mental Status: He is alert and oriented to person, place, and time.   Psychiatric:         Mood and Affect: Mood normal.         Behavior: Behavior normal.         Assessment/Plan   Problem List Items Addressed This Visit       AF (paroxysmal atrial fibrillation) (CMS/[facility])    Relevant Medications    amiodarone (Pacerone) 200 mg tablet    apixaban (Eliquis) 5 mg tablet    digoxin (Lanoxin) 125 MCG tablet    furosemide (Lasix) 40 mg tablet    Other Relevant Orders    Referral to Cardiology    Right-sided heart failure (CMS/[facility])    Relevant Medications    amiodarone (Pacerone) 200 mg tablet    apixaban (Eliquis) 5 mg tablet    digoxin (Lanoxin) 125 MCG tablet    spironolactone (Aldactone) 25 mg tablet    Other Relevant Orders    Referral to Cardiology    Hyponatremia    Pulmonary emphysema (CMS/[facility])    Relevant Medications    ipratropium-albuteroL (Duo-Neb) 0.5-2.5 mg/3 mL nebulizer solution    albuterol 90 mcg/actuation inhaler    fluticasone furoate-vilanteroL (Breo Ellipta) 200-25 mcg/dose inhaler    Hypoxia    Dependence on continuous supplemental oxygen    Extreme obesity with alveolar hypoventilation (CMS/[facility])    Medicare annual wellness visit, subsequent - Primary     Other Visit Diagnoses       B12 deficiency        Relevant Medications    cyanocobalamin (Vitamin B-12) 1,000 mcg tablet    Mixed hyperlipidemia        Relevant Medications    atorvastatin (Lipitor) 40 mg tablet    Anxiety        Relevant Medications    venlafaxine XR (Effexor-XR) 37.5 mg 24 hr capsule        Patient with CPAP, hypoxia and COPD. Has nebulizer, and CPAP machine with oxygen that he bleeds in, 2L at night and continuous during day.   Follow up three months.     Referrral to cardiology.

## 2024-03-11 ENCOUNTER — PATIENT OUTREACH (OUTPATIENT)
Dept: CARE COORDINATION | Facility: CLINIC | Age: 64
End: 2024-03-11
Payer: COMMERCIAL

## 2024-03-11 NOTE — PROGRESS NOTES
Unable to reach patient for one month post discharge follow up call.   LVM with call back number for patient to call if needed   If no voicemail available call attempts x 2 were made to contact the patient to assist with any questions or concerns patient may have.  Elayne Martinez LPN

## 2024-03-16 PROBLEM — F41.1 GENERALIZED ANXIETY DISORDER: Status: ACTIVE | Noted: 2023-01-11

## 2024-03-16 PROBLEM — R60.0 EDEMA OF LOWER EXTREMITY: Status: ACTIVE | Noted: 2023-01-11

## 2024-03-16 PROBLEM — R41.0 DISORIENTATION: Status: ACTIVE | Noted: 2024-03-16

## 2024-03-16 PROBLEM — I63.9 CEREBRAL INFARCTION (MULTI): Status: ACTIVE | Noted: 2024-03-16

## 2024-03-16 PROBLEM — R41.82 ALTERED MENTAL STATUS: Status: ACTIVE | Noted: 2024-03-16

## 2024-03-16 PROBLEM — N17.9 ACUTE KIDNEY INJURY (CMS-HCC): Status: ACTIVE | Noted: 2024-03-16

## 2024-03-16 PROBLEM — J44.9 CHRONIC OBSTRUCTIVE LUNG DISEASE (MULTI): Status: ACTIVE | Noted: 2020-06-16

## 2024-03-16 PROBLEM — R11.14 BILIOUS VOMITING: Status: ACTIVE | Noted: 2024-03-16

## 2024-03-16 PROBLEM — J45.909 ASTHMA (HHS-HCC): Status: ACTIVE | Noted: 2020-06-16

## 2024-03-16 PROBLEM — R74.8 ELEVATED LIVER ENZYMES: Status: ACTIVE | Noted: 2023-02-19

## 2024-03-16 PROBLEM — J30.2 SEASONAL ALLERGIC RHINITIS: Status: ACTIVE | Noted: 2020-06-16

## 2024-03-16 PROBLEM — I25.10 CORONARY ARTERIOSCLEROSIS: Status: ACTIVE | Noted: 2023-01-11

## 2024-03-16 PROBLEM — E83.42 HYPOMAGNESEMIA: Status: ACTIVE | Noted: 2024-03-16

## 2024-04-07 NOTE — PROGRESS NOTES
Counseling:  The patient was counseled regarding diagnostic results, instructions for management, risk factor reductions, prognosis, patient and family education, impressions, risks and benefits of treatment options and importance of compliance with treatment.      Chief Complaint:   The patient presents today for post-hospitalization followup of a-fib and heart failure.     History Of Present Illness:    Johnny Caicedo is a 63 y.o. male patient whose PMH is significant for paroxysmal atrial fibrillation, right-sided heart failure, hyperlipidemia, HTN, anxiety, cerebral infarction, pulmonary emphysema and COPD. He presents today for post-hospitalization followup of a-fib and heart failure. The patient was admitted to hospital from 02/03/2024 to 02/07/2024 after presenting to the ED with a chief complaint of SOB and LE edema. Upon arrival, the patient was tachypneic, EKG showed a-fib with RVR and CTA of the chest was negative for PE. Following CTA, the patient developed back pain concerning for possible aortic involvement, and his mental status became altered; therefore, he was taken back for a repeat CT scan of the chest/abdomen/pelvis and head, which was ultimately unremarkable. While in hospital, echocardiogram demonstrated an EF of 60-65%, moderate concentric LVH, reduced RV systolic function and moderate dilatation of the aortic root (4.20 cm), and carotid duplex revealed findings consistent with less than 50% stenosis bilaterally. Throughout his hospital course, the patient remained in a-fib with controlled ventricular response; therefore, upon discharge, he was continued on amiodarone loading with plan for outpatient cardioversion. Today, the patient states that he is feeling improved since being discharged from hospital. He reports persistent, although steadily improving SOB. He denies any CP or chest discomfort. EKG today shows that the patient is in sinus rhythm. The patient is compliant with his prescribed  "medications.     Last Recorded Vitals:  Vitals:    04/08/24 1321   BP: 152/86   BP Location: Left arm   Pulse: 99   Weight: (!) 157 kg (347 lb)   Height: 1.905 m (6' 3\")       Past Surgical History:  He has no past surgical history on file.      Social History:  He reports that he has quit smoking. His smoking use included cigarettes. He has never used smokeless tobacco. He reports that he does not currently use alcohol. He reports that he does not use drugs.    Family History:  Family History   Problem Relation Name Age of Onset    Other (paulson's) Mother      Atrial fibrillation Father      Hypertension Brother          Allergies:  Ethyl alcohol    Outpatient Medications:  Current Outpatient Medications   Medication Instructions    albuterol 90 mcg/actuation inhaler 2 puffs, inhalation, 4 times daily RT    amiodarone (PACERONE) 200 mg, oral, Daily    apixaban (ELIQUIS) 5 mg, oral, Every 12 hours    atorvastatin (LIPITOR) 40 mg, oral, Daily    cyanocobalamin (VITAMIN B-12) 1,000 mcg, oral, Daily    digoxin (LANOXIN) 125 mcg, oral, Daily    fluticasone furoate-vilanteroL (Breo Ellipta) 200-25 mcg/dose inhaler 1 puff, inhalation, Daily    furosemide (LASIX) 40 mg, oral, Daily    ipratropium-albuteroL (Duo-Neb) 0.5-2.5 mg/3 mL nebulizer solution 3 mL, nebulization, 4 times daily PRN    spironolactone (ALDACTONE) 25 mg, oral, Daily    venlafaxine XR (EFFEXOR-XR) 37.5 mg, oral, Daily, Do not crush or chew.     Review of Systems   Cardiovascular:  Positive for dyspnea on exertion.   All other systems reviewed and are negative.     Physical Exam:  Constitutional:       Appearance: Healthy appearance. Not in distress.   Neck:      Vascular: No JVR. JVD normal.   Pulmonary:      Effort: Pulmonary effort is normal.      Breath sounds: Normal breath sounds. No wheezing. No rhonchi. No rales.   Chest:      Chest wall: Not tender to palpatation.   Cardiovascular:      PMI at left midclavicular line. Normal rate. Regular rhythm. " Normal S1. Normal S2.       Murmurs: There is no murmur.      No gallop.  No click. No rub.   Pulses:     Intact distal pulses.   Edema:     Peripheral edema absent.   Abdominal:      General: Bowel sounds are normal.      Palpations: Abdomen is soft.      Tenderness: There is no abdominal tenderness.   Musculoskeletal: Normal range of motion.         General: No tenderness. Skin:     General: Skin is warm and dry.   Neurological:      General: No focal deficit present.      Mental Status: Alert and oriented to person, place and time.          Last Labs:  CBC -  Lab Results   Component Value Date    WBC 10.0 02/06/2024    HGB 11.9 (L) 02/06/2024    HCT 38.0 (L) 02/06/2024    MCV 84 02/06/2024     02/06/2024       CMP -  Lab Results   Component Value Date    CALCIUM 8.1 (L) 02/07/2024    PHOS 4.5 02/04/2024    PROT 8.1 02/03/2024    ALBUMIN 3.1 (L) 02/04/2024    AST 16 02/03/2024    ALT 18 02/03/2024    ALKPHOS 53 02/03/2024    BILITOT 0.6 02/03/2024       LIPID PANEL -   Lab Results   Component Value Date    CHOL 86 02/04/2024    TRIG 60 02/04/2024    HDL 28.8 02/04/2024    CHHDL 3.0 02/04/2024    VLDL 12 02/04/2024    NHDL 57 02/04/2024       RENAL FUNCTION PANEL -   Lab Results   Component Value Date    GLUCOSE 111 (H) 02/07/2024     02/07/2024    K 3.5 02/07/2024     02/07/2024    CO2 27 02/07/2024    ANIONGAP 11 02/07/2024    BUN 19 02/07/2024    CREATININE 1.30 02/07/2024    CALCIUM 8.1 (L) 02/07/2024    PHOS 4.5 02/04/2024    ALBUMIN 3.1 (L) 02/04/2024        Lab Results   Component Value Date     (H) 02/03/2024    HGBA1C 6.4 (H) 02/04/2024       Last Cardiology Tests:  02/06/2024 - Vascular Lab Carotid Artery Duplex    1. Right Carotid: Findings are consistent with less than 50% stenosis of the right proximal internal carotid artery. Right external carotid artery appears patent with no evidence of stenosis. The right vertebral artery is patent with antegrade flow. No evidence of  hemodynamically significant stenosis in the right subclavian artery.  2. Left Carotid: Findings are consistent with less than 50% stenosis of the left proximal internal carotid artery. Left external carotid artery appears patent with no evidence of stenosis. The left vertebral artery is patent with antegrade flow. No evidence of hemodynamically significant stenosis in the left subclavian artery.    02/05/2024 - TTE  1. Left ventricular systolic function is normal with a 60-65% estimated ejection fraction.  2. There is moderate concentric left ventricular hypertrophy.  3. There is reduced right ventricular systolic function.  4. Aortic valve stenosis is not present.  5. There is moderate dilatation of the aortic root.    02/04/2024 - CTA Chest/Abdomen/Pelvis  1. No aortic aneurysm or dissection.  2. Fluid contents within the colon which may be infectious or inflammatory.  3. Mild mediastinal, retroperitoneal, periportal and inguinal lymphadenopathy possibly reactive.  4. Interstitial opacities in the lung parenchyma which may be related to fibrosis and/or scarring. Mild superimposed interstitial edema not entirely excluded.  5. Minimal L5-S1 anterolisthesis with chronic appearing pars defects.  6. Hepatic steatosis, coronary artery calcifications and additional findings as detailed.    02/03/2024 - CXR  Narrowing of the trachea is suggested.  Stricture or sequela are COPD are considerations.  There could also be pathologic thickening of the trachea.  Consider chest CT for additional evaluation. No consolidation, pleural effusion, or pulmonary edema.    Assessment/Plan   1) New Onset Atrial Fibrillation   On amiodarone 200 mg daily, Eliquis 5 mg BID, digoxin 125 mcg daily  Hospital admission 02/03/2024 to 02/07/2024 with SOB and LE edema  Patient was tachypneic upon arrival with EKG showing a-fib with RVR  CTA chest negative for PE  Patient subsequently developed back pain concerning for possible aortic involvement,  as well as altered mental status  Repeat CT chest/abdomen/pelvis/head negative for AAA or intracranial abnormality   TTE with LVEF 60-65%, moderate concentric LVH, reduced RV systolic function, moderate dilatation of aortic root (4.20 cm)  Carotid duplex with less than 50% stenosis bilaterally  Patient remained in a-fib with controlled ventricular response throughout his hospital course  Discharged on continued amiodarone loading with plan for outpatient cardioversion   EKG today shows that the patient is in sinus rhythm  Check dig level  Followup with MANUELA Martin, in 1 month    2) Right-Sided Heart Failure  On furosemide 40 mg daily, spironolactone 25 mg daily  Hospital admission 02/03/2024 to 02/07/2024 with SOB and LE edema  BNP elevated at 207  Patient was tachypneic upon arrival with EKG showing a-fib with RVR  CTA chest negative for PE  TTE with LVEF 60-65%, moderate concentric LVH, reduced RV systolic function, moderate dilatation of aortic root (4.20 cm)  Carotid duplex with less than 50% stenosis bilaterally  Denies CP or chest discomfort  SOB is steadily improving  Currently on home oxygen therapy  Referral placed to Dr. Betancourt, pulmonologist, for management of home oxygen   Check CMP and BNP  Followup with MANUELA Martin, in 1 month      Scribe Attestation  By signing my name below, I, Manjit Farah   attest that this documentation has been prepared under the direction and in the presence of Gerry Laird MD.

## 2024-04-08 ENCOUNTER — OFFICE VISIT (OUTPATIENT)
Dept: CARDIOLOGY | Facility: CLINIC | Age: 64
End: 2024-04-08
Payer: COMMERCIAL

## 2024-04-08 VITALS
WEIGHT: 315 LBS | SYSTOLIC BLOOD PRESSURE: 152 MMHG | BODY MASS INDEX: 39.17 KG/M2 | DIASTOLIC BLOOD PRESSURE: 86 MMHG | HEIGHT: 75 IN | HEART RATE: 99 BPM

## 2024-04-08 DIAGNOSIS — I48.0 AF (PAROXYSMAL ATRIAL FIBRILLATION) (MULTI): ICD-10-CM

## 2024-04-08 DIAGNOSIS — I50.9 DYSPNEA DUE TO CONGESTIVE HEART FAILURE (MULTI): ICD-10-CM

## 2024-04-08 DIAGNOSIS — I50.812 CHRONIC RIGHT-SIDED HEART FAILURE (MULTI): ICD-10-CM

## 2024-04-08 PROCEDURE — 99213 OFFICE O/P EST LOW 20 MIN: CPT | Performed by: INTERNAL MEDICINE

## 2024-04-08 PROCEDURE — 1036F TOBACCO NON-USER: CPT | Performed by: INTERNAL MEDICINE

## 2024-04-08 PROCEDURE — 3079F DIAST BP 80-89 MM HG: CPT | Performed by: INTERNAL MEDICINE

## 2024-04-08 PROCEDURE — 93000 ELECTROCARDIOGRAM COMPLETE: CPT | Performed by: INTERNAL MEDICINE

## 2024-04-08 PROCEDURE — 3077F SYST BP >= 140 MM HG: CPT | Performed by: INTERNAL MEDICINE

## 2024-04-08 ASSESSMENT — ENCOUNTER SYMPTOMS
LOSS OF SENSATION IN FEET: 0
DEPRESSION: 0
DYSPNEA ON EXERTION: 1
OCCASIONAL FEELINGS OF UNSTEADINESS: 0

## 2024-04-08 NOTE — LETTER
April 8, 2024     Gwendolyn Bowling MD  [address] [address] [facility] [address]  [facility] [address]    Patient: Johnny Caicedo   YOB: 1960   Date of Visit: 4/8/2024       Dear Dr. Gwendolyn Bowling MD:    Thank you for referring Johnny Caicedo to me for evaluation. Below are my notes for this consultation.  If you have questions, please do not hesitate to call me. I look forward to following your patient along with you.       Sincerely,     Gerry Laird MD      CC: No Recipients  ______________________________________________________________________________________    Counseling:  The patient was counseled regarding diagnostic results, instructions for management, risk factor reductions, prognosis, patient and family education, impressions, risks and benefits of treatment options and importance of compliance with treatment.      Chief Complaint:   The patient presents today for post-hospitalization followup of a-fib and heart failure.     History Of Present Illness:    Johnny Caicedo is a 63 y.o. male patient whose PMH is significant for paroxysmal atrial fibrillation, right-sided heart failure, hyperlipidemia, HTN, anxiety, cerebral infarction, pulmonary emphysema and COPD. He presents today for post-hospitalization followup of a-fib and heart failure. The patient was admitted to hospital from 02/03/2024 to 02/07/2024 after presenting to the ED with a chief complaint of SOB and LE edema. Upon arrival, the patient was tachypneic, EKG showed a-fib with RVR and CTA of the chest was negative for PE. Following CTA, the patient developed back pain concerning for possible aortic involvement, and his mental status became altered; therefore, he was taken back for a repeat CT scan of the chest/abdomen/pelvis and head, which was ultimately unremarkable. While in hospital, echocardiogram demonstrated an EF of 60-65%, moderate concentric LVH, reduced RV systolic function and moderate dilatation of the  "aortic root (4.20 cm), and carotid duplex revealed findings consistent with less than 50% stenosis bilaterally. Throughout his hospital course, the patient remained in a-fib with controlled ventricular response; therefore, upon discharge, he was continued on amiodarone loading with plan for outpatient cardioversion. Today, the patient states that he is feeling improved since being discharged from hospital. He reports persistent, although steadily improving SOB. He denies any CP or chest discomfort. EKG today shows that the patient is in sinus rhythm. The patient is compliant with his prescribed medications.     Last Recorded Vitals:  Vitals:    04/08/24 1321   BP: 152/86   BP Location: Left arm   Pulse: 99   Weight: (!) 157 kg (347 lb)   Height: 1.905 m (6' 3\")       Past Surgical History:  He has no past surgical history on file.      Social History:  He reports that he has quit smoking. His smoking use included cigarettes. He has never used smokeless tobacco. He reports that he does not currently use alcohol. He reports that he does not use drugs.    Family History:  Family History   Problem Relation Name Age of Onset   • Other (paulson's) Mother     • Atrial fibrillation Father     • Hypertension Brother          Allergies:  Ethyl alcohol    Outpatient Medications:  Current Outpatient Medications   Medication Instructions   • albuterol 90 mcg/actuation inhaler 2 puffs, inhalation, 4 times daily RT   • amiodarone (PACERONE) 200 mg, oral, Daily   • apixaban (ELIQUIS) 5 mg, oral, Every 12 hours   • atorvastatin (LIPITOR) 40 mg, oral, Daily   • cyanocobalamin (VITAMIN B-12) 1,000 mcg, oral, Daily   • digoxin (LANOXIN) 125 mcg, oral, Daily   • fluticasone furoate-vilanteroL (Breo Ellipta) 200-25 mcg/dose inhaler 1 puff, inhalation, Daily   • furosemide (LASIX) 40 mg, oral, Daily   • ipratropium-albuteroL (Duo-Neb) 0.5-2.5 mg/3 mL nebulizer solution 3 mL, nebulization, 4 times daily PRN   • spironolactone (ALDACTONE) 25 " mg, oral, Daily   • venlafaxine XR (EFFEXOR-XR) 37.5 mg, oral, Daily, Do not crush or chew.     Review of Systems   Cardiovascular:  Positive for dyspnea on exertion.   All other systems reviewed and are negative.     Physical Exam:  Constitutional:       Appearance: Healthy appearance. Not in distress.   Neck:      Vascular: No JVR. JVD normal.   Pulmonary:      Effort: Pulmonary effort is normal.      Breath sounds: Normal breath sounds. No wheezing. No rhonchi. No rales.   Chest:      Chest wall: Not tender to palpatation.   Cardiovascular:      PMI at left midclavicular line. Normal rate. Regular rhythm. Normal S1. Normal S2.       Murmurs: There is no murmur.      No gallop.  No click. No rub.   Pulses:     Intact distal pulses.   Edema:     Peripheral edema absent.   Abdominal:      General: Bowel sounds are normal.      Palpations: Abdomen is soft.      Tenderness: There is no abdominal tenderness.   Musculoskeletal: Normal range of motion.         General: No tenderness. Skin:     General: Skin is warm and dry.   Neurological:      General: No focal deficit present.      Mental Status: Alert and oriented to person, place and time.          Last Labs:  CBC -  Lab Results   Component Value Date    WBC 10.0 02/06/2024    HGB 11.9 (L) 02/06/2024    HCT 38.0 (L) 02/06/2024    MCV 84 02/06/2024     02/06/2024       CMP -  Lab Results   Component Value Date    CALCIUM 8.1 (L) 02/07/2024    PHOS 4.5 02/04/2024    PROT 8.1 02/03/2024    ALBUMIN 3.1 (L) 02/04/2024    AST 16 02/03/2024    ALT 18 02/03/2024    ALKPHOS 53 02/03/2024    BILITOT 0.6 02/03/2024       LIPID PANEL -   Lab Results   Component Value Date    CHOL 86 02/04/2024    TRIG 60 02/04/2024    HDL 28.8 02/04/2024    CHHDL 3.0 02/04/2024    VLDL 12 02/04/2024    NHDL 57 02/04/2024       RENAL FUNCTION PANEL -   Lab Results   Component Value Date    GLUCOSE 111 (H) 02/07/2024     02/07/2024    K 3.5 02/07/2024     02/07/2024    CO2 27  02/07/2024    ANIONGAP 11 02/07/2024    BUN 19 02/07/2024    CREATININE 1.30 02/07/2024    CALCIUM 8.1 (L) 02/07/2024    PHOS 4.5 02/04/2024    ALBUMIN 3.1 (L) 02/04/2024        Lab Results   Component Value Date     (H) 02/03/2024    HGBA1C 6.4 (H) 02/04/2024       Last Cardiology Tests:  02/06/2024 - Vascular Lab Carotid Artery Duplex    1. Right Carotid: Findings are consistent with less than 50% stenosis of the right proximal internal carotid artery. Right external carotid artery appears patent with no evidence of stenosis. The right vertebral artery is patent with antegrade flow. No evidence of hemodynamically significant stenosis in the right subclavian artery.  2. Left Carotid: Findings are consistent with less than 50% stenosis of the left proximal internal carotid artery. Left external carotid artery appears patent with no evidence of stenosis. The left vertebral artery is patent with antegrade flow. No evidence of hemodynamically significant stenosis in the left subclavian artery.    02/05/2024 - TTE  1. Left ventricular systolic function is normal with a 60-65% estimated ejection fraction.  2. There is moderate concentric left ventricular hypertrophy.  3. There is reduced right ventricular systolic function.  4. Aortic valve stenosis is not present.  5. There is moderate dilatation of the aortic root.    02/04/2024 - CTA Chest/Abdomen/Pelvis  1. No aortic aneurysm or dissection.  2. Fluid contents within the colon which may be infectious or inflammatory.  3. Mild mediastinal, retroperitoneal, periportal and inguinal lymphadenopathy possibly reactive.  4. Interstitial opacities in the lung parenchyma which may be related to fibrosis and/or scarring. Mild superimposed interstitial edema not entirely excluded.  5. Minimal L5-S1 anterolisthesis with chronic appearing pars defects.  6. Hepatic steatosis, coronary artery calcifications and additional findings as detailed.    02/03/2024 - CXR  Narrowing  of the trachea is suggested.  Stricture or sequela are COPD are considerations.  There could also be pathologic thickening of the trachea.  Consider chest CT for additional evaluation. No consolidation, pleural effusion, or pulmonary edema.    Assessment/Plan  1) New Onset Atrial Fibrillation   On amiodarone 200 mg daily, Eliquis 5 mg BID, digoxin 125 mcg daily  Hospital admission 02/03/2024 to 02/07/2024 with SOB and LE edema  Patient was tachypneic upon arrival with EKG showing a-fib with RVR  CTA chest negative for PE  Patient subsequently developed back pain concerning for possible aortic involvement, as well as altered mental status  Repeat CT chest/abdomen/pelvis/head negative for AAA or intracranial abnormality   TTE with LVEF 60-65%, moderate concentric LVH, reduced RV systolic function, moderate dilatation of aortic root (4.20 cm)  Carotid duplex with less than 50% stenosis bilaterally  Patient remained in a-fib with controlled ventricular response throughout his hospital course  Discharged on continued amiodarone loading with plan for outpatient cardioversion   EKG today shows that the patient is in sinus rhythm  Check dig level  Followup with MANUELA Martin, in 1 month    2) Right-Sided Heart Failure  On furosemide 40 mg daily, spironolactone 25 mg daily  Hospital admission 02/03/2024 to 02/07/2024 with SOB and LE edema  BNP elevated at 207  Patient was tachypneic upon arrival with EKG showing a-fib with RVR  CTA chest negative for PE  TTE with LVEF 60-65%, moderate concentric LVH, reduced RV systolic function, moderate dilatation of aortic root (4.20 cm)  Carotid duplex with less than 50% stenosis bilaterally  Denies CP or chest discomfort  SOB is steadily improving  Currently on home oxygen therapy  Referral placed to Dr. Betancourt, pulmonologist, for management of home oxygen   Check CMP and BNP  Followup with MANUELA Martin, in 1 month      Scribe Attestation  By signing my name below, I, Desire  Manjit Medina   attest that this documentation has been prepared under the direction and in the presence of Gerry Laird MD.

## 2024-04-08 NOTE — PATIENT INSTRUCTIONS
Continue all current medications as prescribed.  Please have blood work drawn at your earliest convenience.   Dr. Laird has placed a referral to Dr. Betancourt, pulmonologist, for management of your home oxygen.  Followup with MANUELA Martin, in 1 month.    If you have any questions or cardiac concerns, please call our office at [phone].

## 2024-05-08 ENCOUNTER — PATIENT OUTREACH (OUTPATIENT)
Dept: CARE COORDINATION | Facility: CLINIC | Age: 64
End: 2024-05-08
Payer: COMMERCIAL

## 2024-05-08 NOTE — PROGRESS NOTES
Patient has met target of no readmission for (90) days post hospital discharge and is graduated from Transitional Care Management program at this time.  Elayne Martinez LPN

## 2024-05-10 ENCOUNTER — TELEPHONE (OUTPATIENT)
Dept: PULMONOLOGY | Facility: HOSPITAL | Age: 64
End: 2024-05-10

## 2024-05-15 ENCOUNTER — TELEPHONE (OUTPATIENT)
Dept: PULMONOLOGY | Facility: HOSPITAL | Age: 64
End: 2024-05-15
Payer: COMMERCIAL

## 2024-05-15 NOTE — TELEPHONE ENCOUNTER
Received a referral for patient to be seen in our office. Attempted to call patient on 5/10/24, 5/13/24 and again on 5/15/24. Left voicemail all 3 times to call the office back to schedule an appointment to be seen. Will wait for patient to call back to schedule.

## 2024-06-06 ENCOUNTER — LAB (OUTPATIENT)
Dept: LAB | Facility: LAB | Age: 64
End: 2024-06-06
Payer: COMMERCIAL

## 2024-06-06 ENCOUNTER — OFFICE VISIT (OUTPATIENT)
Dept: PRIMARY CARE | Facility: CLINIC | Age: 64
End: 2024-06-06
Payer: COMMERCIAL

## 2024-06-06 VITALS
HEART RATE: 90 BPM | BODY MASS INDEX: 39.17 KG/M2 | DIASTOLIC BLOOD PRESSURE: 84 MMHG | OXYGEN SATURATION: 92 % | SYSTOLIC BLOOD PRESSURE: 152 MMHG | WEIGHT: 315 LBS | RESPIRATION RATE: 20 BRPM | HEIGHT: 75 IN

## 2024-06-06 DIAGNOSIS — J43.2 CENTRILOBULAR EMPHYSEMA (MULTI): ICD-10-CM

## 2024-06-06 DIAGNOSIS — E66.2 EXTREME OBESITY WITH ALVEOLAR HYPOVENTILATION (MULTI): ICD-10-CM

## 2024-06-06 DIAGNOSIS — I50.812 CHRONIC RIGHT-SIDED HEART FAILURE (MULTI): ICD-10-CM

## 2024-06-06 DIAGNOSIS — Z23 NEED FOR PNEUMOCOCCAL VACCINE: ICD-10-CM

## 2024-06-06 DIAGNOSIS — R35.1 NOCTURIA: ICD-10-CM

## 2024-06-06 DIAGNOSIS — J45.40 MODERATE PERSISTENT ASTHMA WITHOUT COMPLICATION (HHS-HCC): ICD-10-CM

## 2024-06-06 DIAGNOSIS — R60.0 EDEMA OF LOWER EXTREMITY: ICD-10-CM

## 2024-06-06 DIAGNOSIS — N40.1 NOCTURIA ASSOCIATED WITH BENIGN PROSTATIC HYPERPLASIA: ICD-10-CM

## 2024-06-06 DIAGNOSIS — E08.65 DIABETES MELLITUS DUE TO UNDERLYING CONDITION WITH HYPERGLYCEMIA, WITHOUT LONG-TERM CURRENT USE OF INSULIN (MULTI): ICD-10-CM

## 2024-06-06 DIAGNOSIS — E66.01 CLASS 3 SEVERE OBESITY DUE TO EXCESS CALORIES WITH SERIOUS COMORBIDITY AND BODY MASS INDEX (BMI) OF 40.0 TO 44.9 IN ADULT (MULTI): ICD-10-CM

## 2024-06-06 DIAGNOSIS — J30.1 SEASONAL ALLERGIC RHINITIS DUE TO POLLEN: ICD-10-CM

## 2024-06-06 DIAGNOSIS — R09.02 HYPOXIA: ICD-10-CM

## 2024-06-06 DIAGNOSIS — R35.1 NOCTURIA ASSOCIATED WITH BENIGN PROSTATIC HYPERPLASIA: ICD-10-CM

## 2024-06-06 DIAGNOSIS — R73.9 BORDERLINE HYPERGLYCEMIA: ICD-10-CM

## 2024-06-06 DIAGNOSIS — I48.0 AF (PAROXYSMAL ATRIAL FIBRILLATION) (MULTI): ICD-10-CM

## 2024-06-06 DIAGNOSIS — Z87.898 HISTORY OF NOCTURIA: ICD-10-CM

## 2024-06-06 DIAGNOSIS — I48.0 AF (PAROXYSMAL ATRIAL FIBRILLATION) (MULTI): Primary | ICD-10-CM

## 2024-06-06 DIAGNOSIS — I10 PRIMARY HYPERTENSION: ICD-10-CM

## 2024-06-06 DIAGNOSIS — T46.2X5S ADVERSE EFFECT OF AMIODARONE, SEQUELA: ICD-10-CM

## 2024-06-06 DIAGNOSIS — I25.10 CORONARY ARTERIOSCLEROSIS: ICD-10-CM

## 2024-06-06 DIAGNOSIS — F41.1 GENERALIZED ANXIETY DISORDER: ICD-10-CM

## 2024-06-06 DIAGNOSIS — I50.9 DYSPNEA DUE TO CONGESTIVE HEART FAILURE (MULTI): ICD-10-CM

## 2024-06-06 DIAGNOSIS — E78.2 MIXED HYPERLIPIDEMIA: ICD-10-CM

## 2024-06-06 PROBLEM — G47.33 OBSTRUCTIVE SLEEP APNEA SYNDROME: Status: ACTIVE | Noted: 2024-06-06

## 2024-06-06 PROBLEM — I63.9 CEREBRAL INFARCTION (MULTI): Status: RESOLVED | Noted: 2024-03-16 | Resolved: 2024-06-06

## 2024-06-06 PROBLEM — R41.82 ALTERED MENTAL STATUS: Status: RESOLVED | Noted: 2024-03-16 | Resolved: 2024-06-06

## 2024-06-06 LAB
ALBUMIN SERPL BCP-MCNC: 4.1 G/DL (ref 3.4–5)
ALP SERPL-CCNC: 38 U/L (ref 33–136)
ALT SERPL W P-5'-P-CCNC: 53 U/L (ref 10–52)
ANION GAP SERPL CALC-SCNC: 13 MMOL/L (ref 10–20)
AST SERPL W P-5'-P-CCNC: 41 U/L (ref 9–39)
BILIRUB SERPL-MCNC: 0.5 MG/DL (ref 0–1.2)
BNP SERPL-MCNC: 55 PG/ML (ref 0–99)
BUN SERPL-MCNC: 16 MG/DL (ref 6–23)
CALCIUM SERPL-MCNC: 8.6 MG/DL (ref 8.6–10.3)
CHLORIDE SERPL-SCNC: 102 MMOL/L (ref 98–107)
CO2 SERPL-SCNC: 25 MMOL/L (ref 21–32)
CREAT SERPL-MCNC: 0.96 MG/DL (ref 0.5–1.3)
DIGOXIN SERPL-MCNC: <0.3 NG/ML (ref 0.8–?)
EGFRCR SERPLBLD CKD-EPI 2021: 88 ML/MIN/1.73M*2
EST. AVERAGE GLUCOSE BLD GHB EST-MCNC: 148 MG/DL
GLUCOSE SERPL-MCNC: 110 MG/DL (ref 74–99)
HBA1C MFR BLD: 6.8 %
POTASSIUM SERPL-SCNC: 4.2 MMOL/L (ref 3.5–5.3)
PROT SERPL-MCNC: 7.6 G/DL (ref 6.4–8.2)
PSA SERPL-MCNC: 0.46 NG/ML
SODIUM SERPL-SCNC: 136 MMOL/L (ref 136–145)
TSH SERPL-ACNC: 3.37 MIU/L (ref 0.44–3.98)

## 2024-06-06 PROCEDURE — 84153 ASSAY OF PSA TOTAL: CPT

## 2024-06-06 PROCEDURE — 3044F HG A1C LEVEL LT 7.0%: CPT | Performed by: FAMILY MEDICINE

## 2024-06-06 PROCEDURE — 3077F SYST BP >= 140 MM HG: CPT | Performed by: FAMILY MEDICINE

## 2024-06-06 PROCEDURE — 90677 PCV20 VACCINE IM: CPT | Performed by: FAMILY MEDICINE

## 2024-06-06 PROCEDURE — G0009 ADMIN PNEUMOCOCCAL VACCINE: HCPCS | Performed by: FAMILY MEDICINE

## 2024-06-06 PROCEDURE — 99215 OFFICE O/P EST HI 40 MIN: CPT | Performed by: FAMILY MEDICINE

## 2024-06-06 PROCEDURE — 3048F LDL-C <100 MG/DL: CPT | Performed by: FAMILY MEDICINE

## 2024-06-06 PROCEDURE — 3079F DIAST BP 80-89 MM HG: CPT | Performed by: FAMILY MEDICINE

## 2024-06-06 PROCEDURE — 1160F RVW MEDS BY RX/DR IN RCRD: CPT | Performed by: FAMILY MEDICINE

## 2024-06-06 PROCEDURE — 1036F TOBACCO NON-USER: CPT | Performed by: FAMILY MEDICINE

## 2024-06-06 PROCEDURE — 83880 ASSAY OF NATRIURETIC PEPTIDE: CPT

## 2024-06-06 PROCEDURE — 1159F MED LIST DOCD IN RCRD: CPT | Performed by: FAMILY MEDICINE

## 2024-06-06 PROCEDURE — 80053 COMPREHEN METABOLIC PANEL: CPT

## 2024-06-06 PROCEDURE — 3008F BODY MASS INDEX DOCD: CPT | Performed by: FAMILY MEDICINE

## 2024-06-06 PROCEDURE — 84443 ASSAY THYROID STIM HORMONE: CPT

## 2024-06-06 PROCEDURE — 80162 ASSAY OF DIGOXIN TOTAL: CPT

## 2024-06-06 PROCEDURE — 83036 HEMOGLOBIN GLYCOSYLATED A1C: CPT

## 2024-06-06 PROCEDURE — 3060F POS MICROALBUMINURIA REV: CPT | Performed by: FAMILY MEDICINE

## 2024-06-06 PROCEDURE — 36415 COLL VENOUS BLD VENIPUNCTURE: CPT

## 2024-06-06 RX ORDER — MONTELUKAST SODIUM 10 MG/1
10 TABLET ORAL NIGHTLY
Qty: 90 TABLET | Refills: 3 | Status: SHIPPED | OUTPATIENT
Start: 2024-06-06 | End: 2025-06-06

## 2024-06-06 RX ORDER — ALBUTEROL SULFATE 90 UG/1
2 AEROSOL, METERED RESPIRATORY (INHALATION) EVERY 4 HOURS PRN
Qty: 25.5 G | Refills: 3 | Status: SHIPPED | OUTPATIENT
Start: 2024-06-06 | End: 2025-06-06

## 2024-06-06 ASSESSMENT — COLUMBIA-SUICIDE SEVERITY RATING SCALE - C-SSRS
1. IN THE PAST MONTH, HAVE YOU WISHED YOU WERE DEAD OR WISHED YOU COULD GO TO SLEEP AND NOT WAKE UP?: NO
2. HAVE YOU ACTUALLY HAD ANY THOUGHTS OF KILLING YOURSELF?: NO
6. HAVE YOU EVER DONE ANYTHING, STARTED TO DO ANYTHING, OR PREPARED TO DO ANYTHING TO END YOUR LIFE?: NO

## 2024-06-06 ASSESSMENT — PATIENT HEALTH QUESTIONNAIRE - PHQ9
2. FEELING DOWN, DEPRESSED OR HOPELESS: NOT AT ALL
SUM OF ALL RESPONSES TO PHQ9 QUESTIONS 1 AND 2: 0
1. LITTLE INTEREST OR PLEASURE IN DOING THINGS: NOT AT ALL

## 2024-06-06 ASSESSMENT — ENCOUNTER SYMPTOMS
DEPRESSION: 0
OCCASIONAL FEELINGS OF UNSTEADINESS: 0
SHORTNESS OF BREATH: 1
LOSS OF SENSATION IN FEET: 0

## 2024-06-06 NOTE — PROGRESS NOTES
Subjective   Patient ID: Johnny Caicedo is a 64 y.o. male.    HPI  64 year old male for follow up. He was last seen 3/2024, and he is due for bloodwork, but had to go to [place] to care for son who was in MVA and broke arm and leg. Would like to go back on montleukast and rescue inhaler in not covered,   Review of Systems   HENT:  Positive for postnasal drip.    Respiratory:  Positive for shortness of breath.    All other systems reviewed and are negative.      Objective   Physical Exam  Vitals reviewed.   Constitutional:       Appearance: Normal appearance.   HENT:      Head: Normocephalic and atraumatic.      Right Ear: Tympanic membrane normal.      Left Ear: Tympanic membrane normal.      Nose: Nose normal.      Mouth/Throat:      Mouth: Mucous membranes are moist.      Pharynx: Oropharynx is clear.   Eyes:      Extraocular Movements: Extraocular movements intact.      Conjunctiva/sclera: Conjunctivae normal.      Pupils: Pupils are equal, round, and reactive to light.   Cardiovascular:      Rate and Rhythm: Normal rate and regular rhythm.      Pulses: Normal pulses.      Heart sounds: Normal heart sounds.   Pulmonary:      Effort: Respiratory distress present.      Breath sounds: Normal breath sounds.   Abdominal:      General: Abdomen is flat. Bowel sounds are normal.      Palpations: Abdomen is soft.   Musculoskeletal:         General: Normal range of motion.      Cervical back: Normal range of motion and neck supple.   Skin:     General: Skin is warm and dry.      Capillary Refill: Capillary refill takes less than 2 seconds.   Neurological:      General: No focal deficit present.      Mental Status: He is alert and oriented to person, place, and time.   Psychiatric:         Mood and Affect: Mood normal.         Behavior: Behavior normal.         Assessment/Plan   Diagnoses and all orders for this visit:  AF (paroxysmal atrial fibrillation) (Multi)  Stable and controlled  apixaban  Due back with cardiology   Has BNP  ordered.   -     Follow Up In [facility]; Future  Coronary arteriosclerosis  Stable and controlled  -     Follow Up In [facility] - Health Maintenance; Future  Mixed hyperlipidemia  Atorvastatin, Lipids 2/2024 excellent  -     Follow Up In [facility]; Future  Primary hypertension  -     Follow Up In [facility] - [facility] - Health Maintenance; Future  Chronic right-sided heart failure (Multi)- stable and controlled  Amiodorone, furosemide, spironolactone.   Class 3 severe obesity due to excess calories with serious comorbidity and body mass index (BMI) of 40.0 to 44.9 in adult (Multi)  -     Hemoglobin A1C; Future  -     TSH with reflex to Free T4 if abnormal; Future  Extreme obesity with alveolar hypoventilation (Multi)  Generalized anxiety disorder- venlafaxine, stable and controlled.  Moderate persistent asthma without complication ([facility])  Centrilobular emphysema (Multi)  -     albuterol (ProAir HFA) 90 mcg/actuation inhaler; Inhale 2 puffs every 4 hours if needed for wheezing or shortness of breath. CFC free  Breo, nebulizer. Oxygen. 2L and bled into Bipap at night.   Hypoxia  Edema of lower extremity  Seasonal allergic rhinitis due to pollen  -     montelukast (Singulair) 10 mg tablet; Take 1 tablet (10 mg) by mouth once daily at bedtime.  Nocturia  -     Prostate Specific Antigen; Future  Borderline hyperglycemia  -     Hemoglobin A1C; Future  Diabetes mellitus due to underlying condition with hyperglycemia, without long-term current use of insulin (Multi)  -     TSH with reflex to Free T4 if abnormal; Future  Need for pneumococcal vaccine  -     Pneumococcal conjugate vaccine, 20-valent (PREVNAR 20)    Follow up six months.

## 2024-06-30 ENCOUNTER — APPOINTMENT (OUTPATIENT)
Dept: RADIOLOGY | Facility: HOSPITAL | Age: 64
End: 2024-06-30
Payer: COMMERCIAL

## 2024-06-30 ENCOUNTER — HOSPITAL ENCOUNTER (EMERGENCY)
Facility: HOSPITAL | Age: 64
Discharge: HOME | End: 2024-06-30
Attending: EMERGENCY MEDICINE
Payer: COMMERCIAL

## 2024-06-30 VITALS
HEART RATE: 78 BPM | BODY MASS INDEX: 39.17 KG/M2 | DIASTOLIC BLOOD PRESSURE: 83 MMHG | RESPIRATION RATE: 20 BRPM | SYSTOLIC BLOOD PRESSURE: 137 MMHG | HEIGHT: 75 IN | WEIGHT: 315 LBS | TEMPERATURE: 96.8 F | OXYGEN SATURATION: 97 %

## 2024-06-30 DIAGNOSIS — L03.116 CELLULITIS OF LEFT LOWER EXTREMITY: Primary | ICD-10-CM

## 2024-06-30 LAB
ALBUMIN SERPL BCP-MCNC: 3.8 G/DL (ref 3.4–5)
ALP SERPL-CCNC: 34 U/L (ref 33–136)
ALT SERPL W P-5'-P-CCNC: 32 U/L (ref 10–52)
ANION GAP SERPL CALC-SCNC: 11 MMOL/L (ref 10–20)
AST SERPL W P-5'-P-CCNC: 22 U/L (ref 9–39)
BASOPHILS # BLD AUTO: 0.06 X10*3/UL (ref 0–0.1)
BASOPHILS NFR BLD AUTO: 0.7 %
BILIRUB SERPL-MCNC: 0.4 MG/DL (ref 0–1.2)
BUN SERPL-MCNC: 18 MG/DL (ref 6–23)
CALCIUM SERPL-MCNC: 8.6 MG/DL (ref 8.6–10.3)
CHLORIDE SERPL-SCNC: 103 MMOL/L (ref 98–107)
CO2 SERPL-SCNC: 28 MMOL/L (ref 21–32)
CREAT SERPL-MCNC: 1.1 MG/DL (ref 0.5–1.3)
EGFRCR SERPLBLD CKD-EPI 2021: 74 ML/MIN/1.73M*2
EOSINOPHIL # BLD AUTO: 0.38 X10*3/UL (ref 0–0.7)
EOSINOPHIL NFR BLD AUTO: 4.4 %
ERYTHROCYTE [DISTWIDTH] IN BLOOD BY AUTOMATED COUNT: 14.6 % (ref 11.5–14.5)
GLUCOSE SERPL-MCNC: 109 MG/DL (ref 74–99)
HCT VFR BLD AUTO: 43.5 % (ref 41–52)
HGB BLD-MCNC: 14 G/DL (ref 13.5–17.5)
IMM GRANULOCYTES # BLD AUTO: 0.05 X10*3/UL (ref 0–0.7)
IMM GRANULOCYTES NFR BLD AUTO: 0.6 % (ref 0–0.9)
LACTATE SERPL-SCNC: 1.1 MMOL/L (ref 0.4–2)
LYMPHOCYTES # BLD AUTO: 1.6 X10*3/UL (ref 1.2–4.8)
LYMPHOCYTES NFR BLD AUTO: 18.6 %
MCH RBC QN AUTO: 27.6 PG (ref 26–34)
MCHC RBC AUTO-ENTMCNC: 32.2 G/DL (ref 32–36)
MCV RBC AUTO: 86 FL (ref 80–100)
MONOCYTES # BLD AUTO: 1.08 X10*3/UL (ref 0.1–1)
MONOCYTES NFR BLD AUTO: 12.5 %
NEUTROPHILS # BLD AUTO: 5.44 X10*3/UL (ref 1.2–7.7)
NEUTROPHILS NFR BLD AUTO: 63.2 %
NRBC BLD-RTO: 0 /100 WBCS (ref 0–0)
PLATELET # BLD AUTO: 169 X10*3/UL (ref 150–450)
POTASSIUM SERPL-SCNC: 3.8 MMOL/L (ref 3.5–5.3)
PROT SERPL-MCNC: 7.2 G/DL (ref 6.4–8.2)
RBC # BLD AUTO: 5.08 X10*6/UL (ref 4.5–5.9)
SODIUM SERPL-SCNC: 138 MMOL/L (ref 136–145)
WBC # BLD AUTO: 8.6 X10*3/UL (ref 4.4–11.3)

## 2024-06-30 PROCEDURE — 80053 COMPREHEN METABOLIC PANEL: CPT | Performed by: EMERGENCY MEDICINE

## 2024-06-30 PROCEDURE — 36415 COLL VENOUS BLD VENIPUNCTURE: CPT | Performed by: EMERGENCY MEDICINE

## 2024-06-30 PROCEDURE — 83605 ASSAY OF LACTIC ACID: CPT | Performed by: EMERGENCY MEDICINE

## 2024-06-30 PROCEDURE — 96375 TX/PRO/DX INJ NEW DRUG ADDON: CPT

## 2024-06-30 PROCEDURE — 96365 THER/PROPH/DIAG IV INF INIT: CPT

## 2024-06-30 PROCEDURE — 87040 BLOOD CULTURE FOR BACTERIA: CPT | Mod: PORLAB | Performed by: EMERGENCY MEDICINE

## 2024-06-30 PROCEDURE — 87070 CULTURE OTHR SPECIMN AEROBIC: CPT | Mod: PORLAB | Performed by: EMERGENCY MEDICINE

## 2024-06-30 PROCEDURE — 93971 EXTREMITY STUDY: CPT

## 2024-06-30 PROCEDURE — 2500000001 HC RX 250 WO HCPCS SELF ADMINISTERED DRUGS (ALT 637 FOR MEDICARE OP): Performed by: EMERGENCY MEDICINE

## 2024-06-30 PROCEDURE — 93971 EXTREMITY STUDY: CPT | Performed by: RADIOLOGY

## 2024-06-30 PROCEDURE — 85025 COMPLETE CBC W/AUTO DIFF WBC: CPT | Performed by: EMERGENCY MEDICINE

## 2024-06-30 PROCEDURE — 99284 EMERGENCY DEPT VISIT MOD MDM: CPT | Mod: 25

## 2024-06-30 PROCEDURE — 2500000005 HC RX 250 GENERAL PHARMACY W/O HCPCS: Performed by: EMERGENCY MEDICINE

## 2024-06-30 PROCEDURE — 2500000004 HC RX 250 GENERAL PHARMACY W/ HCPCS (ALT 636 FOR OP/ED): Performed by: EMERGENCY MEDICINE

## 2024-06-30 PROCEDURE — 96361 HYDRATE IV INFUSION ADD-ON: CPT

## 2024-06-30 RX ORDER — CLINDAMYCIN HYDROCHLORIDE 150 MG/1
150 CAPSULE ORAL EVERY 6 HOURS
Qty: 28 CAPSULE | Refills: 0 | Status: SHIPPED | OUTPATIENT
Start: 2024-06-30 | End: 2024-07-03

## 2024-06-30 RX ORDER — CLINDAMYCIN HYDROCHLORIDE 150 MG/1
450 CAPSULE ORAL ONCE
Status: COMPLETED | OUTPATIENT
Start: 2024-06-30 | End: 2024-06-30

## 2024-06-30 ASSESSMENT — COLUMBIA-SUICIDE SEVERITY RATING SCALE - C-SSRS
6. HAVE YOU EVER DONE ANYTHING, STARTED TO DO ANYTHING, OR PREPARED TO DO ANYTHING TO END YOUR LIFE?: NO
1. IN THE PAST MONTH, HAVE YOU WISHED YOU WERE DEAD OR WISHED YOU COULD GO TO SLEEP AND NOT WAKE UP?: NO
2. HAVE YOU ACTUALLY HAD ANY THOUGHTS OF KILLING YOURSELF?: NO

## 2024-06-30 ASSESSMENT — PAIN SCALES - GENERAL: PAINLEVEL_OUTOF10: 0 - NO PAIN

## 2024-06-30 ASSESSMENT — PAIN - FUNCTIONAL ASSESSMENT: PAIN_FUNCTIONAL_ASSESSMENT: 0-10

## 2024-06-30 NOTE — ED PROVIDER NOTES
HPI   Chief Complaint   Patient presents with    Leg Swelling     L       Patient presents emergency department for redness swelling and warmth to his left lower extremity.  Patient states this started yesterday.  Patient thought it might of just been heat rash.  Patient has been admitted to the hospital before for bacteremia.  He denies any fevers.  Denies history of blood clots.                          Joslyn Coma Scale Score: 15                     Patient History   Past Medical History:   Diagnosis Date    Altered mental status 03/16/2024    Cerebral infarction (Multi) 03/16/2024    COPD (chronic obstructive pulmonary disease) (Multi)      History reviewed. No pertinent surgical history.  Family History   Problem Relation Name Age of Onset    Other (paulson's) Mother      Atrial fibrillation Father      Hypertension Brother       Social History     Tobacco Use    Smoking status: Former     Types: Cigarettes    Smokeless tobacco: Never   Vaping Use    Vaping status: Never Used   Substance Use Topics    Alcohol use: Not Currently    Drug use: Never       Physical Exam   ED Triage Vitals [06/30/24 1644]   Temperature Heart Rate Respirations BP   35.6 °C (96 °F) 95 20 166/87      Pulse Ox Temp Source Heart Rate Source Patient Position   98 % Temporal -- --      BP Location FiO2 (%)     -- --       Physical Exam  Vitals and nursing note reviewed.   Constitutional:       General: He is not in acute distress.     Appearance: He is well-developed. He is obese.   HENT:      Head: Normocephalic and atraumatic.      Right Ear: External ear normal.      Left Ear: External ear normal.      Mouth/Throat:      Mouth: Mucous membranes are moist.      Pharynx: Oropharynx is clear.   Eyes:      Extraocular Movements: Extraocular movements intact.      Conjunctiva/sclera: Conjunctivae normal.   Neck:      Trachea: Trachea normal.   Cardiovascular:      Rate and Rhythm: Normal rate.   Pulmonary:      Effort: Pulmonary effort is  normal. No respiratory distress.      Breath sounds: Normal breath sounds.   Abdominal:      General: Bowel sounds are normal.      Palpations: Abdomen is soft.      Tenderness: There is no abdominal tenderness.   Musculoskeletal:         General: No swelling or tenderness.      Cervical back: No tenderness.   Skin:     General: Skin is warm and dry.      Capillary Refill: Capillary refill takes less than 2 seconds.      Comments: Chronic skin changes of bilateral lower extremities, increased swelling, warmth and redness to the left lower extremity with slightly small amount of purulent drainage from the anterior leg that was swabbed   Neurological:      General: No focal deficit present.      Mental Status: He is alert and oriented to person, place, and time.      Comments:         Psychiatric:         Mood and Affect: Mood normal.         Thought Content: Thought content does not include homicidal or suicidal ideation.         ED Course & MDM   ED Course as of 06/30/24 2042   Sun Jun 30, 2024   1731 Patient presents with redness and swelling to his left lower extremity. Available chart reviewed. On initial assessment the patient was found non-toxic, no acute distress, vitals hemodynamically stable and afebrile. Initial concern for sepsis, cellulitis, DVT.  Patient given liter of fluids and started on broad-spectrum antibiotics including vancomycin and Zosyn. [JH]   2039 Lactate: 1.1 [JH]   2039 CBC and Auto Differential(!)  CBC shows no leukocytosis, no anemia, no thrombocytopenia [JH]   2039 Comprehensive Metabolic Panel(!)  Metabolic panel shows no electrolyte abnormalities, normal kidney liver function [JH]   2040 Vascular US Lower Extremity Venous Duplex Left  Ultrasounds negative for DVT [JH]   2040 Patient's prior blood cultures were reviewed showing Streptococcus dysgalactiae sensitive to clindamycin.  Patient does not meet sepsis criteria.  I do not believe the patient needs admission to the hospital.   Given his prior blood cultures will give clindamycin here and start the patient on clindamycin for home.  Blood and drainage cultures are pending.    No indication for admission.  Discussed findings and diagnosis with the patient, follow-up and return to ED precautions given, patient voiced understanding, agrees with plan, questions answered, patient was discharged in stable condition. [JH]      ED Course User Index  [JH] Gilberto Crockett MD         Diagnoses as of 06/30/24 2042   Cellulitis of left lower extremity       Medical Decision Making      Procedure  Procedures     Gilberto Crockett MD  06/30/24 2042

## 2024-06-30 NOTE — ED TRIAGE NOTES
Pt to ED with c/o LLE redness and edema starting yesterday. Pt wears 2L NC at home. Pt reports taking a water pill.

## 2024-07-02 LAB
BACTERIA SPEC CULT: ABNORMAL
BACTERIA SPEC CULT: ABNORMAL
GRAM STN SPEC: ABNORMAL
GRAM STN SPEC: ABNORMAL

## 2024-07-03 ENCOUNTER — TELEPHONE (OUTPATIENT)
Dept: PHARMACY | Facility: HOSPITAL | Age: 64
End: 2024-07-03
Payer: COMMERCIAL

## 2024-07-03 DIAGNOSIS — T14.8XXA WOUND INFECTION: Primary | ICD-10-CM

## 2024-07-03 DIAGNOSIS — L08.9 WOUND INFECTION: Primary | ICD-10-CM

## 2024-07-03 RX ORDER — AMOXICILLIN AND CLAVULANATE POTASSIUM 875; 125 MG/1; MG/1
1 TABLET, FILM COATED ORAL 2 TIMES DAILY
Qty: 10 TABLET | Refills: 0 | Status: SHIPPED | OUTPATIENT
Start: 2024-07-03 | End: 2024-07-08

## 2024-07-03 NOTE — PROGRESS NOTES
EDPD Note: Bug-Drug Mismatch    Contacted Mr./Mrs./Ms. Johnny Caicedo regarding a positive wound culture/result that was taken during their recent emergency room visit. I completed education with patient. The patient is not being treated appropriately with Clindamycin. Patient states redness and swelling went down, but only slight improvement. Denies purulent discharge. Patient states he will make a follow up appointment with PCP once he is back home from vacation. I agreed with plan.     The following prescription was sent to the patient's preferred pharmacy. No further follow up needed from EDPD Team.   Drug: Augmentin 875-125mg  Si tab BID   Days Supply: 5    Susceptibility data from last 90 days.  Collected Specimen Info Organism Clindamycin Erythromycin Oxacillin Tetracycline Trimethoprim/Sulfamethoxazole Vancomycin   24 Tissue/Biopsy from Skin/Superficial Abscess Methicillin Susceptible Staphylococcus aureus (MSSA)  R  R  S  S  S  S     Streptococcus dysgalactiae/canis             Kika Donahue, PharmD

## 2024-07-05 LAB
BACTERIA BLD CULT: NORMAL
BACTERIA BLD CULT: NORMAL

## 2024-09-13 PROCEDURE — 99284 EMERGENCY DEPT VISIT MOD MDM: CPT

## 2024-09-13 ASSESSMENT — COLUMBIA-SUICIDE SEVERITY RATING SCALE - C-SSRS
2. HAVE YOU ACTUALLY HAD ANY THOUGHTS OF KILLING YOURSELF?: NO
1. IN THE PAST MONTH, HAVE YOU WISHED YOU WERE DEAD OR WISHED YOU COULD GO TO SLEEP AND NOT WAKE UP?: NO
6. HAVE YOU EVER DONE ANYTHING, STARTED TO DO ANYTHING, OR PREPARED TO DO ANYTHING TO END YOUR LIFE?: NO

## 2024-09-13 ASSESSMENT — PAIN DESCRIPTION - LOCATION: LOCATION: LEG

## 2024-09-13 ASSESSMENT — PAIN DESCRIPTION - PAIN TYPE: TYPE: ACUTE PAIN

## 2024-09-13 ASSESSMENT — PAIN - FUNCTIONAL ASSESSMENT: PAIN_FUNCTIONAL_ASSESSMENT: 0-10

## 2024-09-13 ASSESSMENT — PAIN SCALES - GENERAL: PAINLEVEL_OUTOF10: 4

## 2024-09-13 ASSESSMENT — PAIN DESCRIPTION - ORIENTATION: ORIENTATION: RIGHT

## 2024-09-14 ENCOUNTER — APPOINTMENT (OUTPATIENT)
Dept: RADIOLOGY | Facility: HOSPITAL | Age: 64
End: 2024-09-14
Payer: COMMERCIAL

## 2024-09-14 ENCOUNTER — APPOINTMENT (OUTPATIENT)
Dept: CARDIOLOGY | Facility: HOSPITAL | Age: 64
End: 2024-09-14
Payer: COMMERCIAL

## 2024-09-14 ENCOUNTER — HOSPITAL ENCOUNTER (EMERGENCY)
Facility: HOSPITAL | Age: 64
Discharge: HOME | End: 2024-09-14
Attending: EMERGENCY MEDICINE
Payer: COMMERCIAL

## 2024-09-14 ENCOUNTER — HOSPITAL ENCOUNTER (OUTPATIENT)
Dept: CARDIOLOGY | Facility: HOSPITAL | Age: 64
Discharge: HOME | End: 2024-09-14
Payer: COMMERCIAL

## 2024-09-14 VITALS
OXYGEN SATURATION: 98 % | TEMPERATURE: 97.2 F | RESPIRATION RATE: 18 BRPM | WEIGHT: 315 LBS | HEART RATE: 77 BPM | HEIGHT: 75 IN | BODY MASS INDEX: 39.17 KG/M2 | DIASTOLIC BLOOD PRESSURE: 86 MMHG | SYSTOLIC BLOOD PRESSURE: 125 MMHG

## 2024-09-14 DIAGNOSIS — L03.115 CELLULITIS OF RIGHT LOWER EXTREMITY: Primary | ICD-10-CM

## 2024-09-14 LAB
ALBUMIN SERPL BCP-MCNC: 3.4 G/DL (ref 3.4–5)
ALP SERPL-CCNC: 33 U/L (ref 33–136)
ALT SERPL W P-5'-P-CCNC: 35 U/L (ref 10–52)
ANION GAP BLDV CALCULATED.4IONS-SCNC: 10 MMOL/L (ref 10–25)
ANION GAP SERPL CALC-SCNC: 12 MMOL/L (ref 10–20)
APPEARANCE UR: CLEAR
AST SERPL W P-5'-P-CCNC: 22 U/L (ref 9–39)
BASE EXCESS BLDV CALC-SCNC: -0.7 MMOL/L (ref -2–3)
BASOPHILS # BLD AUTO: 0.03 X10*3/UL (ref 0–0.1)
BASOPHILS NFR BLD AUTO: 0.2 %
BILIRUB SERPL-MCNC: 0.8 MG/DL (ref 0–1.2)
BILIRUB UR STRIP.AUTO-MCNC: NEGATIVE MG/DL
BNP SERPL-MCNC: 277 PG/ML (ref 0–99)
BODY TEMPERATURE: 37 DEGREES CELSIUS
BUN SERPL-MCNC: 18 MG/DL (ref 6–23)
CA-I BLDV-SCNC: 1.06 MMOL/L (ref 1.1–1.33)
CALCIUM SERPL-MCNC: 7.7 MG/DL (ref 8.6–10.3)
CARDIAC TROPONIN I PNL SERPL HS: 10 NG/L (ref 0–20)
CARDIAC TROPONIN I PNL SERPL HS: 8 NG/L (ref 0–20)
CHLORIDE BLDV-SCNC: 97 MMOL/L (ref 98–107)
CHLORIDE SERPL-SCNC: 97 MMOL/L (ref 98–107)
CO2 SERPL-SCNC: 23 MMOL/L (ref 21–32)
COLOR UR: ABNORMAL
CREAT SERPL-MCNC: 1.09 MG/DL (ref 0.5–1.3)
EGFRCR SERPLBLD CKD-EPI 2021: 75 ML/MIN/1.73M*2
EOSINOPHIL # BLD AUTO: 0.04 X10*3/UL (ref 0–0.7)
EOSINOPHIL NFR BLD AUTO: 0.3 %
ERYTHROCYTE [DISTWIDTH] IN BLOOD BY AUTOMATED COUNT: 15.2 % (ref 11.5–14.5)
GLUCOSE BLDV-MCNC: 107 MG/DL (ref 74–99)
GLUCOSE SERPL-MCNC: 105 MG/DL (ref 74–99)
GLUCOSE UR STRIP.AUTO-MCNC: NORMAL MG/DL
HCO3 BLDV-SCNC: 23.4 MMOL/L (ref 22–26)
HCT VFR BLD AUTO: 41.3 % (ref 41–52)
HCT VFR BLD EST: 41 % (ref 41–52)
HGB BLD-MCNC: 13.1 G/DL (ref 13.5–17.5)
HGB BLDV-MCNC: 13.5 G/DL (ref 13.5–17.5)
HOLD SPECIMEN: NORMAL
HYALINE CASTS #/AREA URNS AUTO: ABNORMAL /LPF
IMM GRANULOCYTES # BLD AUTO: 0.11 X10*3/UL (ref 0–0.7)
IMM GRANULOCYTES NFR BLD AUTO: 0.8 % (ref 0–0.9)
INHALED O2 CONCENTRATION: 28 %
KETONES UR STRIP.AUTO-MCNC: NEGATIVE MG/DL
LACTATE BLDV-SCNC: 1.3 MMOL/L (ref 0.4–2)
LEUKOCYTE ESTERASE UR QL STRIP.AUTO: NEGATIVE
LYMPHOCYTES # BLD AUTO: 1.05 X10*3/UL (ref 1.2–4.8)
LYMPHOCYTES NFR BLD AUTO: 7.6 %
MCH RBC QN AUTO: 26.6 PG (ref 26–34)
MCHC RBC AUTO-ENTMCNC: 31.7 G/DL (ref 32–36)
MCV RBC AUTO: 84 FL (ref 80–100)
MONOCYTES # BLD AUTO: 0.88 X10*3/UL (ref 0.1–1)
MONOCYTES NFR BLD AUTO: 6.4 %
MUCOUS THREADS #/AREA URNS AUTO: ABNORMAL /LPF
NEUTROPHILS # BLD AUTO: 11.69 X10*3/UL (ref 1.2–7.7)
NEUTROPHILS NFR BLD AUTO: 84.7 %
NITRITE UR QL STRIP.AUTO: NEGATIVE
NRBC BLD-RTO: 0 /100 WBCS (ref 0–0)
OXYHGB MFR BLDV: 88.9 % (ref 45–75)
PCO2 BLDV: 36 MM HG (ref 41–51)
PH BLDV: 7.42 PH (ref 7.33–7.43)
PH UR STRIP.AUTO: 5.5 [PH]
PLATELET # BLD AUTO: 138 X10*3/UL (ref 150–450)
PO2 BLDV: 57 MM HG (ref 35–45)
POTASSIUM BLDV-SCNC: 3.6 MMOL/L (ref 3.5–5.3)
POTASSIUM SERPL-SCNC: 3.4 MMOL/L (ref 3.5–5.3)
PROT SERPL-MCNC: 7.2 G/DL (ref 6.4–8.2)
PROT UR STRIP.AUTO-MCNC: ABNORMAL MG/DL
RBC # BLD AUTO: 4.92 X10*6/UL (ref 4.5–5.9)
RBC # UR STRIP.AUTO: ABNORMAL /UL
RBC #/AREA URNS AUTO: ABNORMAL /HPF
SAO2 % BLDV: 91 % (ref 45–75)
SODIUM BLDV-SCNC: 127 MMOL/L (ref 136–145)
SODIUM SERPL-SCNC: 129 MMOL/L (ref 136–145)
SP GR UR STRIP.AUTO: 1.01
UROBILINOGEN UR STRIP.AUTO-MCNC: NORMAL MG/DL
WBC # BLD AUTO: 13.8 X10*3/UL (ref 4.4–11.3)
WBC #/AREA URNS AUTO: ABNORMAL /HPF

## 2024-09-14 PROCEDURE — 2500000004 HC RX 250 GENERAL PHARMACY W/ HCPCS (ALT 636 FOR OP/ED): Performed by: EMERGENCY MEDICINE

## 2024-09-14 PROCEDURE — 84132 ASSAY OF SERUM POTASSIUM: CPT | Mod: 59 | Performed by: EMERGENCY MEDICINE

## 2024-09-14 PROCEDURE — 2500000005 HC RX 250 GENERAL PHARMACY W/O HCPCS: Performed by: EMERGENCY MEDICINE

## 2024-09-14 PROCEDURE — 71045 X-RAY EXAM CHEST 1 VIEW: CPT | Performed by: RADIOLOGY

## 2024-09-14 PROCEDURE — 84484 ASSAY OF TROPONIN QUANT: CPT | Performed by: EMERGENCY MEDICINE

## 2024-09-14 PROCEDURE — 71045 X-RAY EXAM CHEST 1 VIEW: CPT

## 2024-09-14 PROCEDURE — 93005 ELECTROCARDIOGRAM TRACING: CPT

## 2024-09-14 PROCEDURE — 36415 COLL VENOUS BLD VENIPUNCTURE: CPT | Performed by: EMERGENCY MEDICINE

## 2024-09-14 PROCEDURE — 96365 THER/PROPH/DIAG IV INF INIT: CPT | Mod: 59

## 2024-09-14 PROCEDURE — 85025 COMPLETE CBC W/AUTO DIFF WBC: CPT | Performed by: EMERGENCY MEDICINE

## 2024-09-14 PROCEDURE — 83880 ASSAY OF NATRIURETIC PEPTIDE: CPT | Performed by: EMERGENCY MEDICINE

## 2024-09-14 PROCEDURE — 87040 BLOOD CULTURE FOR BACTERIA: CPT | Mod: PORLAB | Performed by: EMERGENCY MEDICINE

## 2024-09-14 PROCEDURE — 84132 ASSAY OF SERUM POTASSIUM: CPT | Performed by: EMERGENCY MEDICINE

## 2024-09-14 PROCEDURE — 81001 URINALYSIS AUTO W/SCOPE: CPT | Performed by: EMERGENCY MEDICINE

## 2024-09-14 PROCEDURE — 96366 THER/PROPH/DIAG IV INF ADDON: CPT

## 2024-09-14 RX ORDER — CEPHALEXIN 500 MG/1
500 CAPSULE ORAL 4 TIMES DAILY
Qty: 28 CAPSULE | Refills: 0 | Status: SHIPPED | OUTPATIENT
Start: 2024-09-14 | End: 2024-09-21

## 2024-09-14 RX ADMIN — VANCOMYCIN HYDROCHLORIDE 2 G: 10 INJECTION, POWDER, LYOPHILIZED, FOR SOLUTION INTRAVENOUS at 03:08

## 2024-09-14 RX ADMIN — PIPERACILLIN SODIUM AND TAZOBACTAM SODIUM 4.5 G: 4; .5 INJECTION, SOLUTION INTRAVENOUS at 02:36

## 2024-09-14 NOTE — ED PROVIDER NOTES
Johnny Caicedo is a 64 y.o. patient presenting to the ED for right leg pain, swelling, redness.  The patient is concerned about infection of his leg.  Additionally he has had some shortness of breath but states this is due to his COPD and not his primary reason for being here currently.  He does wear nasal cannula at home and is on home settings at the time of my evaluation reporting minimal shortness of breath.  No significant worsening of his chronic cough.  No chest pain.  No recent fever.  He does have increased pain in his leg.    Additional History Obtained from: None  Limitations to History: None  ------------------------------------------------------------------------------------------------------------------------------------------  Physical Exam:  Appearance: Alert, cooperative.  Skin: Warm, dry.  There is significant erythema to the right lower leg and foot.  It is associated with edema that is nonpitting.  No open wounds or drainage.  Eyes: Cornea clear. No scleral icterus or injection.   ENT: Mucous membranes moist.  Pulmonary: No accessory muscle use or stridor. Clear lung sounds bilaterally without rhonchi or wheezing.   Cardiac: Heart sounds regular without murmur. B/L radial pulses full and symmetric.   Abdomen: Soft, not tender.  No rebound or guarding.   Neurological: Face symmetrical. Voice clear. Appropriately conversant.   Psychiatric: Appropriate mood and affect.    Medical Decision Making:  Chronic Medical Conditions Significantly Affecting Care:  has a past medical history of Altered mental status (03/16/2024), Cerebral infarction (Multi) (03/16/2024), and COPD (chronic obstructive pulmonary disease) (Multi).    Social Determinants of Health Significantly Affecting Care: None identified    Differential Diagnosis Considered but not limited to: Patient's exam is consistent with cellulitis.  He does have a significant area of erythema and significant swelling.  He is anticoagulated on Eliquis  making DVT much less likely.  He was given broad-spectrum antibiotics while workup obtained.      External Records Reviewed:   I reviewed recent and relevant outside records including:     Independent Interpretation of Studies: The following studies were ordered as part of the emergency department work up and independently interpreted by me. See ED Course for details.    CBC with mild leukocytosis to 13.8 and otherwise unremarkable.  CMP with mild hyponatremia at 129 otherwise on hold.  Venous full panel shows normal pH and lactate.  Troponin is normal.  BNP is mildly elevated.  Urinalysis is without evidence of infection.  The patient is not septic.    I discussed the results with the patient.  I did recommend admission however the patient inquires if this can be treated outpatient as he would like to go home if at all possible.  We discussed the risks and benefits of admission versus outpatient management.  He is comfortable managing this at home and returning to the emergency department should he develop any new or worsening symptoms.  I do think this is a reasonable approach and he was therefore discharged in stable condition with instructions on follow-up and return precautions.    Diagnoses as of 09/14/24 0514   Cellulitis of right lower extremity            Jackie Waggoner,   09/15/24 1744

## 2024-09-15 LAB
BACTERIA BLD CULT: NORMAL
BACTERIA BLD CULT: NORMAL

## 2024-09-18 LAB
BACTERIA BLD CULT: NORMAL
BACTERIA BLD CULT: NORMAL

## 2024-09-19 LAB
ATRIAL RATE: 234 BPM
PR INTERVAL: 124 MS
Q ONSET: 251 MS
QRS COUNT: 19 BEATS
QRS DURATION: 101 MS
QT INTERVAL: 313 MS
QTC CALCULATION(BAZETT): 432 MS
QTC FREDERICIA: 387 MS
R AXIS: 29 DEGREES
T AXIS: 40 DEGREES
T OFFSET: 407 MS
VENTRICULAR RATE: 114 BPM

## 2024-12-11 ENCOUNTER — APPOINTMENT (OUTPATIENT)
Dept: PRIMARY CARE | Facility: CLINIC | Age: 64
End: 2024-12-11
Payer: COMMERCIAL

## 2025-03-14 ENCOUNTER — OFFICE VISIT (OUTPATIENT)
Dept: CARDIOLOGY | Facility: HOSPITAL | Age: 65
End: 2025-03-14
Payer: COMMERCIAL

## 2025-03-14 VITALS
HEART RATE: 113 BPM | SYSTOLIC BLOOD PRESSURE: 142 MMHG | BODY MASS INDEX: 39.17 KG/M2 | HEIGHT: 75 IN | DIASTOLIC BLOOD PRESSURE: 88 MMHG | WEIGHT: 315 LBS

## 2025-03-14 DIAGNOSIS — I50.9 DYSPNEA DUE TO CONGESTIVE HEART FAILURE: ICD-10-CM

## 2025-03-14 DIAGNOSIS — I48.0 AF (PAROXYSMAL ATRIAL FIBRILLATION) (MULTI): ICD-10-CM

## 2025-03-14 DIAGNOSIS — E78.2 MIXED HYPERLIPIDEMIA: ICD-10-CM

## 2025-03-14 DIAGNOSIS — I50.812 CHRONIC RIGHT-SIDED HEART FAILURE: ICD-10-CM

## 2025-03-14 LAB
Q ONSET: 222 MS
QRS COUNT: 18 BEATS
QRS DURATION: 88 MS
QT INTERVAL: 316 MS
QTC CALCULATION(BAZETT): 433 MS
QTC FREDERICIA: 390 MS
R AXIS: 49 DEGREES
T AXIS: 81 DEGREES
T OFFSET: 380 MS
VENTRICULAR RATE: 113 BPM

## 2025-03-14 PROCEDURE — 93005 ELECTROCARDIOGRAM TRACING: CPT | Performed by: INTERNAL MEDICINE

## 2025-03-14 PROCEDURE — 1159F MED LIST DOCD IN RCRD: CPT | Performed by: INTERNAL MEDICINE

## 2025-03-14 PROCEDURE — 99213 OFFICE O/P EST LOW 20 MIN: CPT | Performed by: INTERNAL MEDICINE

## 2025-03-14 PROCEDURE — 1160F RVW MEDS BY RX/DR IN RCRD: CPT | Performed by: INTERNAL MEDICINE

## 2025-03-14 PROCEDURE — 3079F DIAST BP 80-89 MM HG: CPT | Performed by: INTERNAL MEDICINE

## 2025-03-14 PROCEDURE — 3008F BODY MASS INDEX DOCD: CPT | Performed by: INTERNAL MEDICINE

## 2025-03-14 PROCEDURE — 99213 OFFICE O/P EST LOW 20 MIN: CPT | Mod: 25 | Performed by: INTERNAL MEDICINE

## 2025-03-14 PROCEDURE — 3077F SYST BP >= 140 MM HG: CPT | Performed by: INTERNAL MEDICINE

## 2025-03-14 PROCEDURE — 1036F TOBACCO NON-USER: CPT | Performed by: INTERNAL MEDICINE

## 2025-03-14 RX ORDER — SPIRONOLACTONE 25 MG/1
25 TABLET ORAL DAILY
Qty: 90 TABLET | Refills: 3 | Status: SHIPPED | OUTPATIENT
Start: 2025-03-14 | End: 2026-03-14

## 2025-03-14 RX ORDER — DIGOXIN 125 MCG
125 TABLET ORAL DAILY
Qty: 90 TABLET | Refills: 3 | Status: SHIPPED | OUTPATIENT
Start: 2025-03-14 | End: 2026-03-14

## 2025-03-14 RX ORDER — ATORVASTATIN CALCIUM 40 MG/1
40 TABLET, FILM COATED ORAL DAILY
Qty: 90 TABLET | Refills: 3 | Status: SHIPPED | OUTPATIENT
Start: 2025-03-14 | End: 2026-03-14

## 2025-03-14 RX ORDER — FUROSEMIDE 40 MG/1
40 TABLET ORAL DAILY
Qty: 90 TABLET | Refills: 3 | Status: SHIPPED | OUTPATIENT
Start: 2025-03-14 | End: 2026-03-14

## 2025-03-14 RX ORDER — DILTIAZEM HYDROCHLORIDE 180 MG/1
180 CAPSULE, COATED, EXTENDED RELEASE ORAL DAILY
Qty: 30 CAPSULE | Refills: 11 | Status: SHIPPED | OUTPATIENT
Start: 2025-03-14 | End: 2026-03-14

## 2025-03-14 ASSESSMENT — ENCOUNTER SYMPTOMS
DEPRESSION: 0
OCCASIONAL FEELINGS OF UNSTEADINESS: 0
LOSS OF SENSATION IN FEET: 0

## 2025-03-14 NOTE — PATIENT INSTRUCTIONS
Discontinue amiodarone.  Start diltiazem 180 mcg daily.  A prescription has been sent to your pharmacy.    Continue all other medications as prescribed. Refills have been sent to your pharmacy.  Dr. Laird has placed a referral to [facility] to request financial assistance for Raeannyajaira.   Dr. Laird has placed a referral to Dr. Betancourt, pulmonologist.   Please have blood work drawn at your earliest convenience.  Repeat echocardiogram (ultrasound of the heart) in 6 months to followup on your heart function and structure.   Followup with Patricia Balderas NP, in 6 months.      If you have any questions or cardiac concerns, please call our office at [phone].

## 2025-03-14 NOTE — LETTER
"March 14, 2025     Gwendolyn Bowlign MD  [address] [facility] [address]  [place] [address]    Patient: Johnny Caicedo   YOB: 1960   Date of Visit: 3/14/2025       Dear Dr. Gwendolyn Bowling MD:    Thank you for referring Johnny Caicedo to me for evaluation. Below are my notes for this consultation.  If you have questions, please do not hesitate to call me. I look forward to following your patient along with you.       Sincerely,     Gerry Laird MD      CC: No Recipients  ______________________________________________________________________________________    Counseling:  The patient was counseled regarding diagnostic results, instructions for management, risk factor reductions, prognosis, patient and family education, impressions, risks and benefits of treatment options and importance of compliance with treatment.      Chief Complaint:   The patient presents today for annual followup of a-fib and heart failure.     History Of Present Illness:    Johnny Caicedo is a 64 y.o. male patient who presents today for annual followup of a-fib and heart failure. His PMH is significant for paroxysmal atrial fibrillation, right-sided heart failure, hyperlipidemia, HTN, anxiety, cerebral infarction, pulmonary emphysema and COPD. Over the past year, the patient states that he has done well from a cardiac standpoint. He denies any CP, chest discomfort or SOB. He also denies any palpitations. Per the patient, he is not currently following with pulmonology. BP has been stable. EKG today shows a-fib with RVR. The patient is compliant with his prescribed medications, although Eliquis is financially prohibitive.      Last Recorded Vitals:  Vitals:    03/14/25 1451   BP: 142/88   BP Location: Right arm   Pulse: (!) 113   Weight: (!) 164 kg (361 lb)   Height: 1.905 m (6' 3\")       Past Surgical History:  He has no past surgical history on file.      Social History:  He reports that he has quit smoking. " His smoking use included cigarettes. He has never used smokeless tobacco. He reports that he does not currently use alcohol. He reports that he does not use drugs.    Family History:  Family History   Problem Relation Name Age of Onset   • Other (paulson's) Mother     • Atrial fibrillation Father     • Hypertension Brother          Allergies:  Ethyl alcohol    Outpatient Medications:  Current Outpatient Medications   Medication Instructions   • albuterol (ProAir HFA) 90 mcg/actuation inhaler 2 puffs, inhalation, Every 4 hours PRN, CFC free   • amiodarone (PACERONE) 200 mg, oral, Daily   • apixaban (ELIQUIS) 5 mg, oral, Every 12 hours   • atorvastatin (LIPITOR) 40 mg, oral, Daily   • digoxin (LANOXIN) 125 mcg, oral, Daily   • fluticasone furoate-vilanteroL (Breo Ellipta) 200-25 mcg/dose inhaler 1 puff, inhalation, Daily   • furosemide (LASIX) 40 mg, oral, Daily   • ipratropium-albuteroL (Duo-Neb) 0.5-2.5 mg/3 mL nebulizer solution 3 mL, nebulization, 4 times daily PRN   • montelukast (SINGULAIR) 10 mg, oral, Nightly   • spironolactone (ALDACTONE) 25 mg, oral, Daily   • venlafaxine XR (EFFEXOR-XR) 37.5 mg, oral, Daily, Do not crush or chew.     Review of Systems   All other systems reviewed and are negative.     Physical Exam:  Constitutional:       Appearance: Healthy appearance. Not in distress.   Neck:      Vascular: No JVR. JVD normal.   Pulmonary:      Effort: Pulmonary effort is normal.      Breath sounds: Normal breath sounds. No wheezing. No rhonchi. No rales.   Chest:      Chest wall: Not tender to palpatation.   Cardiovascular:      PMI at left midclavicular line. Normal rate. Regular rhythm. Normal S1. Normal S2.       Murmurs: There is no murmur.      No gallop.  No click. No rub.   Pulses:     Intact distal pulses.   Edema:     Peripheral edema absent.   Abdominal:      General: Bowel sounds are normal.      Palpations: Abdomen is soft.      Tenderness: There is no abdominal tenderness.   Musculoskeletal:  Normal range of motion.         General: No tenderness. Skin:     General: Skin is warm and dry.   Neurological:      General: No focal deficit present.      Mental Status: Alert and oriented to person, place and time.          Last Labs:  CBC -  Lab Results   Component Value Date    WBC 13.8 (H) 09/14/2024    HGB 13.1 (L) 09/14/2024    HCT 41.3 09/14/2024    MCV 84 09/14/2024     (L) 09/14/2024       CMP -  Lab Results   Component Value Date    CALCIUM 7.7 (L) 09/14/2024    PHOS 4.5 02/04/2024    PROT 7.2 09/14/2024    ALBUMIN 3.4 09/14/2024    AST 22 09/14/2024    ALT 35 09/14/2024    ALKPHOS 33 09/14/2024    BILITOT 0.8 09/14/2024       LIPID PANEL -   Lab Results   Component Value Date    CHOL 86 02/04/2024    TRIG 60 02/04/2024    HDL 28.8 02/04/2024    CHHDL 3.0 02/04/2024    VLDL 12 02/04/2024    NHDL 57 02/04/2024       RENAL FUNCTION PANEL -   Lab Results   Component Value Date    GLUCOSE 105 (H) 09/14/2024     (L) 09/14/2024    K 3.4 (L) 09/14/2024    CL 97 (L) 09/14/2024    CO2 23 09/14/2024    ANIONGAP 12 09/14/2024    BUN 18 09/14/2024    CREATININE 1.09 09/14/2024    CALCIUM 7.7 (L) 09/14/2024    PHOS 4.5 02/04/2024    ALBUMIN 3.4 09/14/2024        Lab Results   Component Value Date     (H) 09/14/2024    HGBA1C 6.8 (H) 06/06/2024       Last Cardiology Tests:  02/06/2024 - Vascular Lab Carotid Artery Duplex    1. Right Carotid: Findings are consistent with less than 50% stenosis of the right proximal internal carotid artery. Right external carotid artery appears patent with no evidence of stenosis. The right vertebral artery is patent with antegrade flow. No evidence of hemodynamically significant stenosis in the right subclavian artery.  2. Left Carotid: Findings are consistent with less than 50% stenosis of the left proximal internal carotid artery. Left external carotid artery appears patent with no evidence of stenosis. The left vertebral artery is patent with antegrade flow. No  evidence of hemodynamically significant stenosis in the left subclavian artery.    02/05/2024 - TTE  1. Left ventricular systolic function is normal with a 60-65% estimated ejection fraction.  2. There is moderate concentric left ventricular hypertrophy.  3. There is reduced right ventricular systolic function.  4. Aortic valve stenosis is not present.  5. There is moderate dilatation of the aortic root.    02/04/2024 - CTA Chest/Abdomen/Pelvis  1. No aortic aneurysm or dissection.  2. Fluid contents within the colon which may be infectious or inflammatory.  3. Mild mediastinal, retroperitoneal, periportal and inguinal lymphadenopathy possibly reactive.  4. Interstitial opacities in the lung parenchyma which may be related to fibrosis and/or scarring. Mild superimposed interstitial edema not entirely excluded.  5. Minimal L5-S1 anterolisthesis with chronic appearing pars defects.  6. Hepatic steatosis, coronary artery calcifications and additional findings as detailed.    02/03/2024 - CXR  Narrowing of the trachea is suggested.  Stricture or sequela are COPD are considerations.  There could also be pathologic thickening of the trachea.  Consider chest CT for additional evaluation. No consolidation, pleural effusion, or pulmonary edema.    Lab review: I have personally reviewed the laboratory result(s).     Assessment/Plan  1) Atrial Fibrillation   On Eliquis 5 mg BID, amiodarone 200 mg daily, digoxin 125 mcg daily  Hospital admission 02/03/2024 to 02/07/2024 with SOB and LE edema  Patient was tachypneic upon arrival with EKG showing a-fib with RVR  CTA chest negative for PE  Patient subsequently developed back pain concerning for possible aortic involvement, as well as altered mental status  Repeat CT chest/abdomen/pelvis/head negative for AAA or intracranial abnormality   TTE with LVEF 60-65%, moderate concentric LVH, reduced RV systolic function, moderate dilatation of aortic root (4.20 cm)  Carotid duplex with  less than 50% stenosis bilaterally  Denies palpitations   In A-fib with RVR by EKG today   Eliquis is financially prohibitive   Discontinue amiodarone  Start diltiazem 180 mcg daily   Continue current medical Rx   Referral placed to [facility] re: financial assistance for Eliquis   Followup with Patricia Balderas NP, in 6 months    2) Right-Sided Heart Failure  On atorvastatin 40 mg daily, furosemide 40 mg daily, spironolactone 25 mg daily  Hospital admission 02/03/2024 to 02/07/2024 with SOB and LE edema  BNP elevated at 207  Patient was tachypneic upon arrival with EKG showing a-fib with RVR  CTA chest negative for PE  TTE with LVEF 60-65%, moderate concentric LVH, reduced RV systolic function, moderate dilatation of aortic root (4.20 cm)  Carotid duplex with less than 50% stenosis bilaterally  Denies CP, chest discomfort or SOB  BP stable  Check CBC, CMP, Lipid Panel, TSH  Continue current medical Rx   Repeat echo 6 months  Followup with Patricia Balderas NP, in 6 months    3) Emphysema/COPD  Not currently being followed by pulmonary   Referral placed to Dr. Serafin Saravia Attestation  By signing my name below, I, Manjit Farah   attest that this documentation has been prepared under the direction and in the presence of Gerry Laird MD.

## 2025-03-14 NOTE — PROGRESS NOTES
"Counseling:  The patient was counseled regarding diagnostic results, instructions for management, risk factor reductions, prognosis, patient and family education, impressions, risks and benefits of treatment options and importance of compliance with treatment.      Chief Complaint:   The patient presents today for annual followup of a-fib and heart failure.     History Of Present Illness:    Johnny Caicedo is a 64 y.o. male patient who presents today for annual followup of a-fib and heart failure. His PMH is significant for paroxysmal atrial fibrillation, right-sided heart failure, hyperlipidemia, HTN, anxiety, cerebral infarction, pulmonary emphysema and COPD. Over the past year, the patient states that he has done well from a cardiac standpoint. He denies any CP, chest discomfort or SOB. He also denies any palpitations. Per the patient, he is not currently following with pulmonology. BP has been stable. EKG today shows a-fib with RVR. The patient is compliant with his prescribed medications, although Eliquis is financially prohibitive.      Last Recorded Vitals:  Vitals:    03/14/25 1451   BP: 142/88   BP Location: Right arm   Pulse: (!) 113   Weight: (!) 164 kg (361 lb)   Height: 1.905 m (6' 3\")       Past Surgical History:  He has no past surgical history on file.      Social History:  He reports that he has quit smoking. His smoking use included cigarettes. He has never used smokeless tobacco. He reports that he does not currently use alcohol. He reports that he does not use drugs.    Family History:  Family History   Problem Relation Name Age of Onset    Other (paulson's) Mother      Atrial fibrillation Father      Hypertension Brother          Allergies:  Ethyl alcohol    Outpatient Medications:  Current Outpatient Medications   Medication Instructions    albuterol (ProAir HFA) 90 mcg/actuation inhaler 2 puffs, inhalation, Every 4 hours PRN, CFC free    amiodarone (PACERONE) 200 mg, oral, Daily    apixaban " (ELIQUIS) 5 mg, oral, Every 12 hours    atorvastatin (LIPITOR) 40 mg, oral, Daily    digoxin (LANOXIN) 125 mcg, oral, Daily    fluticasone furoate-vilanteroL (Breo Ellipta) 200-25 mcg/dose inhaler 1 puff, inhalation, Daily    furosemide (LASIX) 40 mg, oral, Daily    ipratropium-albuteroL (Duo-Neb) 0.5-2.5 mg/3 mL nebulizer solution 3 mL, nebulization, 4 times daily PRN    montelukast (SINGULAIR) 10 mg, oral, Nightly    spironolactone (ALDACTONE) 25 mg, oral, Daily    venlafaxine XR (EFFEXOR-XR) 37.5 mg, oral, Daily, Do not crush or chew.     Review of Systems   All other systems reviewed and are negative.     Physical Exam:  Constitutional:       Appearance: Healthy appearance. Not in distress.   Neck:      Vascular: No JVR. JVD normal.   Pulmonary:      Effort: Pulmonary effort is normal.      Breath sounds: Normal breath sounds. No wheezing. No rhonchi. No rales.   Chest:      Chest wall: Not tender to palpatation.   Cardiovascular:      PMI at left midclavicular line. Normal rate. Regular rhythm. Normal S1. Normal S2.       Murmurs: There is no murmur.      No gallop.  No click. No rub.   Pulses:     Intact distal pulses.   Edema:     Peripheral edema absent.   Abdominal:      General: Bowel sounds are normal.      Palpations: Abdomen is soft.      Tenderness: There is no abdominal tenderness.   Musculoskeletal: Normal range of motion.         General: No tenderness. Skin:     General: Skin is warm and dry.   Neurological:      General: No focal deficit present.      Mental Status: Alert and oriented to person, place and time.          Last Labs:  CBC -  Lab Results   Component Value Date    WBC 13.8 (H) 09/14/2024    HGB 13.1 (L) 09/14/2024    HCT 41.3 09/14/2024    MCV 84 09/14/2024     (L) 09/14/2024       CMP -  Lab Results   Component Value Date    CALCIUM 7.7 (L) 09/14/2024    PHOS 4.5 02/04/2024    PROT 7.2 09/14/2024    ALBUMIN 3.4 09/14/2024    AST 22 09/14/2024    ALT 35 09/14/2024    ALKPHOS 33  09/14/2024    BILITOT 0.8 09/14/2024       LIPID PANEL -   Lab Results   Component Value Date    CHOL 86 02/04/2024    TRIG 60 02/04/2024    HDL 28.8 02/04/2024    CHHDL 3.0 02/04/2024    VLDL 12 02/04/2024    NHDL 57 02/04/2024       RENAL FUNCTION PANEL -   Lab Results   Component Value Date    GLUCOSE 105 (H) 09/14/2024     (L) 09/14/2024    K 3.4 (L) 09/14/2024    CL 97 (L) 09/14/2024    CO2 23 09/14/2024    ANIONGAP 12 09/14/2024    BUN 18 09/14/2024    CREATININE 1.09 09/14/2024    CALCIUM 7.7 (L) 09/14/2024    PHOS 4.5 02/04/2024    ALBUMIN 3.4 09/14/2024        Lab Results   Component Value Date     (H) 09/14/2024    HGBA1C 6.8 (H) 06/06/2024       Last Cardiology Tests:  02/06/2024 - Vascular Lab Carotid Artery Duplex    1. Right Carotid: Findings are consistent with less than 50% stenosis of the right proximal internal carotid artery. Right external carotid artery appears patent with no evidence of stenosis. The right vertebral artery is patent with antegrade flow. No evidence of hemodynamically significant stenosis in the right subclavian artery.  2. Left Carotid: Findings are consistent with less than 50% stenosis of the left proximal internal carotid artery. Left external carotid artery appears patent with no evidence of stenosis. The left vertebral artery is patent with antegrade flow. No evidence of hemodynamically significant stenosis in the left subclavian artery.    02/05/2024 - TTE  1. Left ventricular systolic function is normal with a 60-65% estimated ejection fraction.  2. There is moderate concentric left ventricular hypertrophy.  3. There is reduced right ventricular systolic function.  4. Aortic valve stenosis is not present.  5. There is moderate dilatation of the aortic root.    02/04/2024 - CTA Chest/Abdomen/Pelvis  1. No aortic aneurysm or dissection.  2. Fluid contents within the colon which may be infectious or inflammatory.  3. Mild mediastinal, retroperitoneal,  periportal and inguinal lymphadenopathy possibly reactive.  4. Interstitial opacities in the lung parenchyma which may be related to fibrosis and/or scarring. Mild superimposed interstitial edema not entirely excluded.  5. Minimal L5-S1 anterolisthesis with chronic appearing pars defects.  6. Hepatic steatosis, coronary artery calcifications and additional findings as detailed.    02/03/2024 - CXR  Narrowing of the trachea is suggested.  Stricture or sequela are COPD are considerations.  There could also be pathologic thickening of the trachea.  Consider chest CT for additional evaluation. No consolidation, pleural effusion, or pulmonary edema.    Lab review: I have personally reviewed the laboratory result(s).     Assessment/Plan   1) Atrial Fibrillation   On Eliquis 5 mg BID, amiodarone 200 mg daily, digoxin 125 mcg daily  Hospital admission 02/03/2024 to 02/07/2024 with SOB and LE edema  Patient was tachypneic upon arrival with EKG showing a-fib with RVR  CTA chest negative for PE  Patient subsequently developed back pain concerning for possible aortic involvement, as well as altered mental status  Repeat CT chest/abdomen/pelvis/head negative for AAA or intracranial abnormality   TTE with LVEF 60-65%, moderate concentric LVH, reduced RV systolic function, moderate dilatation of aortic root (4.20 cm)  Carotid duplex with less than 50% stenosis bilaterally  Denies palpitations   In A-fib with RVR by EKG today   Eliquis is financially prohibitive   Discontinue amiodarone  Start diltiazem 180 mcg daily   Continue current medical Rx   Referral placed to Clinical Pharmacy re: financial assistance for Eliquis   Followup with Patricia Balderas NP, in 6 months    2) Right-Sided Heart Failure  On atorvastatin 40 mg daily, furosemide 40 mg daily, spironolactone 25 mg daily  Hospital admission 02/03/2024 to 02/07/2024 with SOB and LE edema  BNP elevated at 207  Patient was tachypneic upon arrival with EKG showing a-fib with  RVR  CTA chest negative for PE  TTE with LVEF 60-65%, moderate concentric LVH, reduced RV systolic function, moderate dilatation of aortic root (4.20 cm)  Carotid duplex with less than 50% stenosis bilaterally  Denies CP, chest discomfort or SOB  BP stable  Check CBC, CMP, Lipid Panel, TSH  Continue current medical Rx   Repeat echo 6 months  Followup with Patricia Balderas NP, in 6 months    3) Emphysema/COPD  Not currently being followed by pulmonary   Referral placed to Dr. Serafin Saravia Attestation  By signing my name below, I, Manjit Farah   attest that this documentation has been prepared under the direction and in the presence of Gerry Laird MD.

## 2025-04-04 ENCOUNTER — OFFICE VISIT (OUTPATIENT)
Dept: PULMONOLOGY | Facility: HOSPITAL | Age: 65
End: 2025-04-04
Payer: COMMERCIAL

## 2025-04-04 VITALS
DIASTOLIC BLOOD PRESSURE: 75 MMHG | BODY MASS INDEX: 41.75 KG/M2 | WEIGHT: 315 LBS | HEART RATE: 93 BPM | RESPIRATION RATE: 18 BRPM | TEMPERATURE: 96.4 F | HEIGHT: 73 IN | SYSTOLIC BLOOD PRESSURE: 111 MMHG | OXYGEN SATURATION: 94 %

## 2025-04-04 DIAGNOSIS — J44.9 CHRONIC OBSTRUCTIVE PULMONARY DISEASE, UNSPECIFIED COPD TYPE (MULTI): Primary | ICD-10-CM

## 2025-04-04 DIAGNOSIS — I50.9 DYSPNEA DUE TO CONGESTIVE HEART FAILURE: ICD-10-CM

## 2025-04-04 DIAGNOSIS — I50.812 CHRONIC RIGHT-SIDED HEART FAILURE: ICD-10-CM

## 2025-04-04 DIAGNOSIS — J45.40 MODERATE PERSISTENT ASTHMA WITHOUT COMPLICATION (HHS-HCC): ICD-10-CM

## 2025-04-04 DIAGNOSIS — G47.33 OSA (OBSTRUCTIVE SLEEP APNEA): ICD-10-CM

## 2025-04-04 DIAGNOSIS — J30.89 PERENNIAL ALLERGIC RHINITIS WITH SEASONAL VARIATION: ICD-10-CM

## 2025-04-04 DIAGNOSIS — I48.0 AF (PAROXYSMAL ATRIAL FIBRILLATION) (MULTI): ICD-10-CM

## 2025-04-04 DIAGNOSIS — R59.0 MEDIASTINAL ADENOPATHY: ICD-10-CM

## 2025-04-04 DIAGNOSIS — E78.2 MIXED HYPERLIPIDEMIA: ICD-10-CM

## 2025-04-04 DIAGNOSIS — J30.2 PERENNIAL ALLERGIC RHINITIS WITH SEASONAL VARIATION: ICD-10-CM

## 2025-04-04 DIAGNOSIS — J96.11 CHRONIC RESPIRATORY FAILURE WITH HYPOXIA: ICD-10-CM

## 2025-04-04 DIAGNOSIS — J84.10 PULMONARY FIBROSIS (MULTI): ICD-10-CM

## 2025-04-04 PROCEDURE — 99214 OFFICE O/P EST MOD 30 MIN: CPT | Performed by: INTERNAL MEDICINE

## 2025-04-04 PROCEDURE — 3078F DIAST BP <80 MM HG: CPT | Performed by: INTERNAL MEDICINE

## 2025-04-04 PROCEDURE — 3074F SYST BP LT 130 MM HG: CPT | Performed by: INTERNAL MEDICINE

## 2025-04-04 PROCEDURE — 3008F BODY MASS INDEX DOCD: CPT | Performed by: INTERNAL MEDICINE

## 2025-04-04 PROCEDURE — 99204 OFFICE O/P NEW MOD 45 MIN: CPT | Performed by: INTERNAL MEDICINE

## 2025-04-04 PROCEDURE — 1159F MED LIST DOCD IN RCRD: CPT | Performed by: INTERNAL MEDICINE

## 2025-04-04 RX ORDER — ALBUTEROL SULFATE 90 UG/1
4 INHALANT RESPIRATORY (INHALATION) ONCE
OUTPATIENT
Start: 2025-04-04

## 2025-04-04 RX ORDER — FLUTICASONE PROPIONATE 50 MCG
1 SPRAY, SUSPENSION (ML) NASAL DAILY
COMMUNITY

## 2025-04-04 RX ORDER — ALBUTEROL SULFATE 0.83 MG/ML
3 SOLUTION RESPIRATORY (INHALATION) ONCE
OUTPATIENT
Start: 2025-04-04 | End: 2025-04-04

## 2025-04-04 RX ORDER — FLUTICASONE FUROATE, UMECLIDINIUM BROMIDE AND VILANTEROL TRIFENATATE 200; 62.5; 25 UG/1; UG/1; UG/1
1 POWDER RESPIRATORY (INHALATION) DAILY
Qty: 1 EACH | Refills: 11 | Status: SHIPPED | OUTPATIENT
Start: 2025-04-04

## 2025-04-04 RX ORDER — CETIRIZINE HYDROCHLORIDE 5 MG/1
5 TABLET ORAL DAILY
Qty: 30 TABLET | Refills: 11 | Status: SHIPPED | OUTPATIENT
Start: 2025-04-04 | End: 2026-04-04

## 2025-04-04 ASSESSMENT — ENCOUNTER SYMPTOMS
DEPRESSION: 0
OCCASIONAL FEELINGS OF UNSTEADINESS: 0
LOSS OF SENSATION IN FEET: 0
SHORTNESS OF BREATH: 1
COUGH: 1

## 2025-04-04 NOTE — PROGRESS NOTES
Subjective   Patient ID:  Johnny Caicedo is a 64 y.o. male who presents for Shortness of Breath (Patient here for NPV. Patient here for shortness of breath. Patient states his breathing has good and bad days. Patient admits to shortness of breath on exertion. Patient admits to having coughing fits to where he falls and his body goes numb. Patient bringing up yellowish green on occasion. Patient wears 2L continuous. Patient wears Cpap every night. Patient not a current smoker. Patient stopped smoking 18 years ago. Patient smoked since he was a teen. Patient smoked 3 PPD. Patient has a dog at home. ) and Breathing Problem (Patient has history of being a . Patient has no other concerns for today.).  HPI  He was seeing pulmonary in [place], [place]. He states he grew up in [place], [place] but working in [place] he moved around. He now lives with his brother. He was diagnosed with COPD around 2015. He started smoking at age of 12 and has smoked 2 to 3 ppd. He stopped smoking around 2007 because of worsening breathing issues. He started home O2 around 2015. He was diagnosed with LAUREANO around 2016 in [place], [place]. Now, he notes shortness of breath with daily activity. He gets daily intermittent cough with scant clear expectoration. He is currently on Breo inhaler and takes Duonebs prn. He also has hx of asthma as a child and teenager. He states his mother passed away from Wegener's.     Review of Systems   HENT:  Positive for congestion and postnasal drip.    Respiratory:  Positive for cough and shortness of breath.    Cardiovascular:  Positive for leg swelling.   Allergic/Immunologic: Positive for environmental allergies.   All other systems reviewed and are negative.      Objective   Physical Exam  Vitals and nursing note reviewed.   Constitutional:       Appearance: Normal appearance.   HENT:      Head: Normocephalic.      Nose: Nose normal.      Mouth/Throat:      Pharynx: Oropharynx is clear.   Eyes:      Extraocular  Movements: Extraocular movements intact.      Conjunctiva/sclera: Conjunctivae normal.      Pupils: Pupils are equal, round, and reactive to light.   Cardiovascular:      Rate and Rhythm: Normal rate and regular rhythm.      Pulses: Normal pulses.      Heart sounds: Normal heart sounds.   Pulmonary:      Effort: Pulmonary effort is normal.      Breath sounds: Normal breath sounds.   Abdominal:      General: Bowel sounds are normal.      Palpations: Abdomen is soft.   Musculoskeletal:         General: Normal range of motion.      Cervical back: Normal range of motion.   Skin:     General: Skin is warm.   Neurological:      General: No focal deficit present.      Mental Status: He is alert and oriented to person, place, and time. Mental status is at baseline.   Psychiatric:         Mood and Affect: Mood normal.         Behavior: Behavior normal.       Assessment/Plan   COPD  Chronic hypoxemic respiratory failure @ 2L  LAUREANO  Chronic allergic rhinitis  BMI 47.5  Mediastinal adenopathy  Nonspecific pulmonary fibrotic changes  GERD/patulous esophagus  Hepatic Steatosis  Chronic LE edema    Recommendations:  Patient is here today to establish for management of his COPD, Hypoxic respiratory failure and LAUREANO. He is overall doing well with residual ALLAN. He is only on Breo inhaler and may benefit with triple therapy. His exacerbations are mostly controlled.     -Change Breo to Trelegy  -Check PFTs and 6MWT  -continue 2 L O2 continuous in the meanwhile  -continue CPAP nightly with O2 bled in. He gets his supplies on [employer]. Advised that he may need support from a local DME. He states his CPAP is only 2 years old since 2023.   -Check IG E, RAST and AAT  -continue Montelukast and flonase for allergic rhinitis. Add Prn Cetirizine daily in AM.  -His last CT chest in Feb 2024 reported subpleural reticulation but on personal review has non-specific characteristics without honeycombing and GGOs could reflect vascular congestion too.  However, has mediastinal adenopathy & retroperitoneal & periportal adenopathy. ?sarcoidosis. No clinical signs to suggest lymphoproliferative disorder. Recommend a repeat CT chest now.   -Recommend baseline CTD work up for pulmonary fibrosis. His mother had hx of Wegener's. He does not have signs of vasculitis himself.   -weight management in the long term.  -LE edema chronic. Follows with Dr. Laird and has been diagnosed with chronic rt sided heart failure. Reduced RV fn mild on ECHO. RVSP 31.     Follow up in 3-4 months.

## 2025-04-04 NOTE — PATIENT INSTRUCTIONS
Please complete blood work today.  Complete lung test and CT chest soon.   Stop taking Breo inhaler when you run out and then start taking Trelegy 1 puff once daily.   Start taking cetirizine 1 tablet daily.   Continue taking Montelukast and flonase.   Continue to wear your O2 and CPAP.   Contact us if any worsening of your respiratory issues.   I will see you back in 3 to 4 months.

## 2025-04-11 DIAGNOSIS — J43.9 PULMONARY EMPHYSEMA, UNSPECIFIED EMPHYSEMA TYPE (MULTI): ICD-10-CM

## 2025-04-11 RX ORDER — IPRATROPIUM BROMIDE AND ALBUTEROL SULFATE 2.5; .5 MG/3ML; MG/3ML
3 SOLUTION RESPIRATORY (INHALATION) 4 TIMES DAILY PRN
Qty: 360 ML | Refills: 5 | Status: SHIPPED | OUTPATIENT
Start: 2025-04-11 | End: 2026-04-11

## 2025-04-15 NOTE — PROGRESS NOTES
Pharmacist Clinic: Cardiology Management    Johnny Caicedo is a 64 y.o. male was referred to Clinical Pharmacy Team for Anticoagulation management.     Referring Provider: Gerry Laird MD    THIS IS A NEW PATIENT APPOINTMENT. PATIENT WILL BE ESTABLISHING CARE WITH CLINICAL PHARMACY.    Appointment was completed by Johnny who was reached at primary number.  Allergies Reviewed? Yes    Allergies   Allergen Reactions    Ethyl Alcohol Hives       Past Medical History:   Diagnosis Date    Altered mental status 03/16/2024    Cerebral infarction 03/16/2024    COPD (chronic obstructive pulmonary disease) (Multi)        Current Outpatient Medications on File Prior to Visit   Medication Sig Dispense Refill    albuterol (ProAir HFA) 90 mcg/actuation inhaler Inhale 2 puffs every 4 hours if needed for wheezing or shortness of breath. CFC free 25.5 g 3    amiodarone (Pacerone) 200 mg tablet TAKE 1 TABLET BY MOUTH EVERY DAY 90 tablet 0    apixaban (Eliquis) 5 mg tablet Take 1 tablet (5 mg) by mouth every 12 hours. 180 tablet 3    atorvastatin (Lipitor) 40 mg tablet TAKE 1 TABLET BY MOUTH EVERY DAY 90 tablet 0    atorvastatin (Lipitor) 40 mg tablet Take 1 tablet (40 mg) by mouth once daily. 90 tablet 3    cetirizine (ZyrTEC) 5 mg tablet Take 1 tablet (5 mg) by mouth once daily. 30 tablet 11    digoxin (Lanoxin) 125 MCG tablet TAKE 1 TABLET BY MOUTH ONCE DAILY. 90 tablet 0    digoxin (Lanoxin) 125 MCG tablet Take 1 tablet (125 mcg) by mouth once daily. 90 tablet 3    dilTIAZem CD (Cardizem CD) 180 mg 24 hr capsule Take 1 capsule (180 mg) by mouth once daily. 30 capsule 11    fluticasone (Flonase) 50 mcg/actuation nasal spray Administer 1 spray into each nostril once daily. Shake gently. Before first use, prime pump. After use, clean tip and replace cap.      fluticasone-umeclidin-vilanter (Trelegy Ellipta) 200-62.5-25 mcg blister with device Inhale 1 puff once daily. 1 each 11    furosemide (Lasix) 40 mg tablet TAKE 1 TABLET BY MOUTH  "EVERY DAY 90 tablet 0    furosemide (Lasix) 40 mg tablet Take 1 tablet (40 mg) by mouth once daily. 90 tablet 3    ipratropium-albuteroL (Duo-Neb) 0.5-2.5 mg/3 mL nebulizer solution Take 3 mL by nebulization 4 times a day as needed for wheezing or shortness of breath. 360 mL 5    montelukast (Singulair) 10 mg tablet Take 1 tablet (10 mg) by mouth once daily at bedtime. 90 tablet 3    multivitamin tablet Take 1 tablet by mouth once daily.      spironolactone (Aldactone) 25 mg tablet TAKE 1 TABLET BY MOUTH EVERY DAY 90 tablet 0    spironolactone (Aldactone) 25 mg tablet Take 1 tablet (25 mg) by mouth once daily. 90 tablet 3    venlafaxine XR (Effexor-XR) 37.5 mg 24 hr capsule TAKE 1 CAPSULE BY MOUTH ONCE DAILY. DO NOT CRUSH OR CHEW. 90 capsule 0    [DISCONTINUED] ipratropium-albuteroL (Duo-Neb) 0.5-2.5 mg/3 mL nebulizer solution Take 3 mL by nebulization 4 times a day as needed for wheezing or shortness of breath. 120 mL 11     No current facility-administered medications on file prior to visit.         RELEVANT LAB RESULTS:  Lab Results   Component Value Date    BILITOT 0.8 09/14/2024    CALCIUM 7.7 (L) 09/14/2024    CO2 23 09/14/2024    CL 97 (L) 09/14/2024    CREATININE 1.09 09/14/2024    GLUCOSE 105 (H) 09/14/2024    ALKPHOS 33 09/14/2024    K 3.4 (L) 09/14/2024    PROT 7.2 09/14/2024     (L) 09/14/2024    AST 22 09/14/2024    ALT 35 09/14/2024    BUN 18 09/14/2024    ANIONGAP 12 09/14/2024    MG 1.86 02/07/2024    PHOS 4.5 02/04/2024    ALBUMIN 3.4 09/14/2024    LIPASE 9 02/03/2024     Lab Results   Component Value Date    TRIG 60 02/04/2024    CHOL 86 02/04/2024    LDLCALC 45 02/04/2024    HDL 28.8 02/04/2024     No results found for: \"BMCBC\", \"CBCDIF\"     PHARMACEUTICAL ASSESSMENT:    MEDICATION RECONCILIATION    Was a medication reconciliation completed at this visit? Yes  Home Pharmacy Reviewed? Yes, describe: [facility]    Added:  - none  Changed:  - none  Removed:  - none    Drug Interactions? " No    Medication Documentation Review Audit       Reviewed by Allison Manuel RN (Registered Nurse) on 04/04/25 at 1053      Medication Order Taking? Sig Documenting Provider Last Dose Status   albuterol (ProAir HFA) 90 mcg/actuation inhaler [id number]  Inhale 2 puffs every 4 hours if needed for wheezing or shortness of breath. CFC free   Patient not taking: Reported on 4/4/2025    Gwendolyn Bowling MD  Active   amiodarone (Pacerone) 200 mg tablet [id number] Yes TAKE 1 TABLET BY MOUTH EVERY DAY Gwendolyn Bowling MD  Active   apixaban (Eliquis) 5 mg tablet [id number] Yes Take 1 tablet (5 mg) by mouth every 12 hours. Gerry Laird MD  Active   atorvastatin (Lipitor) 40 mg tablet [id number] Yes TAKE 1 TABLET BY MOUTH EVERY DAY Gwendolyn Bowling MD  Active   atorvastatin (Lipitor) 40 mg tablet [id number] Yes Take 1 tablet (40 mg) by mouth once daily. Gerry Laird MD  Active   Breo Ellipta 200-25 mcg/dose inhaler [id number] Yes INHALE 1 PUFF BY MOUTH ONCE DAILY Gwendolyn Bowling MD  Active   digoxin (Lanoxin) 125 MCG tablet [id number] Yes TAKE 1 TABLET BY MOUTH ONCE DAILY. Gwendolyn Bowling MD  Active   digoxin (Lanoxin) 125 MCG tablet [id number] Yes Take 1 tablet (125 mcg) by mouth once daily. Gerry Laird MD  Active   dilTIAZem CD (Cardizem CD) 180 mg 24 hr capsule [id number] Yes Take 1 capsule (180 mg) by mouth once daily. Gerry Laird MD  Active   fluticasone (Flonase) 50 mcg/actuation nasal spray [id number] Yes Administer 1 spray into each nostril once daily. Shake gently. Before first use, prime pump. After use, clean tip and replace cap. Masha Loco MD  Active   furosemide (Lasix) 40 mg tablet [id number] Yes TAKE 1 TABLET BY MOUTH EVERY DAY Gwendolyn Bowling MD  Active   furosemide (Lasix) 40 mg tablet [id number] Yes Take 1 tablet (40 mg) by mouth once daily. Gerry Laird MD  Active   ipratropium-albuteroL (Duo-Neb) 0.5-2.5 mg/3 mL nebulizer solution [id number]  Take 3 mL by nebulization 4 times a day as needed  for wheezing or shortness of breath. Gwendolyn Bowling MD  Active   montelukast (Singulair) 10 mg tablet [id number] Yes Take 1 tablet (10 mg) by mouth once daily at bedtime. Gwendolyn Bowling MD  Active   spironolactone (Aldactone) 25 mg tablet [id number] Yes TAKE 1 TABLET BY MOUTH EVERY DAY Gwendolyn Bowling MD  Active   spironolactone (Aldactone) 25 mg tablet [id number] Yes Take 1 tablet (25 mg) by mouth once daily. Gerry Laird MD  Active   venlafaxine XR (Effexor-XR) 37.5 mg 24 hr capsule [id number] Yes TAKE 1 CAPSULE BY MOUTH ONCE DAILY. DO NOT CRUSH OR CHEW. Gwendolyn Bowling MD  Active                    DISEASE MANAGEMENT ASSESSMENT:     ANTICOAGULATION ASSESSMENT    The ASCVD Risk score (Girma ROJAS, et al., 2019) failed to calculate for the following reasons:    The valid total cholesterol range is 130 to 320 mg/dL    DIAGNOSIS: prevention of nonvalvular atrial fibrilliation stroke and systemic embolism  - Patient is projected to be on anticoagulation indefinitely  - [id number] Score: [3] (only included if diagnosis is atrial fibrillation)   Age: [<65 (0)] [65-74 (+1)] [> 75 (+2)]: 1  Sex: [Male/Female (+1)]: 0  CHF history: [No/Yes(+1)]: 1  Hypertension history: [No/Yes(+1)]: 1  Stroke/TIA/thromboembolism history: [No/Yes(+2)]: 0  Vascular disease history (prior MI, peripheral artery disease, aortic plaque): [No/Yes(+1)]: 0  Diabetes history: [No/Yes(+1)]: 0    CURRENT PHARMACOTHERAPY:   Eliquis 5mg twice daily  Scr 1.09mg/dl  64 yoa  Weighs 360lbs    Affordability/Accessibility: high cost with name brand medications  Adherence/Organization: report adherence  Adverse Reactions: some bruising noted  Recent Hospitalizations: none reported  Recent Falls/Trauma: none reported  Changes in Tobacco or Alcohol Intake:   Tobacco: does not use  Alcohol: does not use    EDUCATION/COUNSELING:   - Counseled patient on MOA, expectations, duration of therapy, contraindications, administration, and monitoring  parameters  - Counseled patient of side effects that are indicative of bleeding such as dark tarry stool, unexplainable bruising, or vomiting up a coffee ground like substance    DISCUSSION/NOTES:   Reports adherence to all medications. Notes that he has a supply of Eliquis at home. Will start application for  PAP to help with the cost of copay.  No major bruising or bleeding noted at today's visit.  Noted infrequent mild bruising that heals on its own.    ASSESSMENT:    Assessment/Plan   Problem List Items Addressed This Visit       AF (paroxysmal atrial fibrillation) (Multi)    No dose adjustments needed to Eliquis at today's visit based on their age, weight, and kidney function.          Relevant Orders    Referral to Clinical Pharmacy    Right-sided heart failure    Relevant Orders    Referral to Clinical Pharmacy    Mixed hyperlipidemia    Relevant Orders    Referral to Clinical Pharmacy      Patient Assistance Program (PAP)    Application for program to be submitted for the following medications: Eliquis    County of Permanent Address: [place]   Prescription Insurance:   Yes   Members of Household: 1   Files Taxes: No       Patient will be email financial information to pharmacist directly at [email or web address].    Patient verbally reports monthly or yearly income which is less than 400% federal poverty level    Patient aware this process may take up to 6 weeks.     If approved medication must be filled through [facility] and MEDICATION WILL BE MAILED TO PATIENT.         RECOMMENDATIONS/PLAN:    CONTINUE  Eliquis 5mg BID    Last Appnt with Referring Provider: 3/14/25  Next Appnt with Referring Provider: 9/15/25 Rosie  Clinical Pharmacist follow up: 5/28/25  Type of Encounter: Virtual    Olvin Fowler, PharmFELIX    Verbal consent to manage patient's drug therapy was obtained from the patient . They were informed they may decline to participate or withdraw from participation in  pharmacy services at any time.    Continue all meds under the continuation of care with the referring provider and clinical pharmacy team.

## 2025-04-16 ENCOUNTER — APPOINTMENT (OUTPATIENT)
Dept: PHARMACY | Facility: HOSPITAL | Age: 65
End: 2025-04-16
Payer: COMMERCIAL

## 2025-04-16 DIAGNOSIS — E78.2 MIXED HYPERLIPIDEMIA: ICD-10-CM

## 2025-04-16 DIAGNOSIS — I50.812 CHRONIC RIGHT-SIDED HEART FAILURE: ICD-10-CM

## 2025-04-16 DIAGNOSIS — I48.0 AF (PAROXYSMAL ATRIAL FIBRILLATION) (MULTI): ICD-10-CM

## 2025-04-16 RX ORDER — BISMUTH SUBSALICYLATE 262 MG
1 TABLET,CHEWABLE ORAL DAILY
COMMUNITY

## 2025-04-18 ENCOUNTER — HOSPITAL ENCOUNTER (OUTPATIENT)
Dept: RESPIRATORY THERAPY | Facility: HOSPITAL | Age: 65
Discharge: HOME | End: 2025-04-18
Payer: COMMERCIAL

## 2025-04-18 DIAGNOSIS — J44.9 CHRONIC OBSTRUCTIVE PULMONARY DISEASE, UNSPECIFIED COPD TYPE (MULTI): ICD-10-CM

## 2025-04-18 LAB
A FUMIGATUS AB SER QL ID: NORMAL
ALBUMIN SERPL-MCNC: 3.9 G/DL (ref 3.6–5.1)
ALP SERPL-CCNC: 40 U/L (ref 35–144)
ALT SERPL-CCNC: 55 U/L (ref 9–46)
ANA SER QL IF: NORMAL
ANCA AB SER QL: NORMAL
ANION GAP SERPL CALCULATED.4IONS-SCNC: 11 MMOL/L (CALC) (ref 7–17)
AST SERPL-CCNC: 38 U/L (ref 10–35)
BASOPHILS # BLD AUTO: 93 CELLS/UL (ref 0–200)
BASOPHILS NFR BLD AUTO: 1 %
BILIRUB SERPL-MCNC: 0.4 MG/DL (ref 0.2–1.2)
BNP SERPL-MCNC: 54 PG/ML
BUN SERPL-MCNC: 13 MG/DL (ref 7–25)
CALCIUM SERPL-MCNC: 8.3 MG/DL (ref 8.6–10.3)
CCP IGG SERPL-ACNC: <16 UNITS
CHLORIDE SERPL-SCNC: 105 MMOL/L (ref 98–110)
CHOLEST SERPL-MCNC: 131 MG/DL
CHOLEST/HDLC SERPL: 2.8 (CALC)
CO2 SERPL-SCNC: 23 MMOL/L (ref 20–32)
CREAT SERPL-MCNC: 0.89 MG/DL (ref 0.7–1.35)
DIGOXIN SERPL-MCNC: <0.5 MCG/L (ref 0.8–2)
EGFRCR SERPLBLD CKD-EPI 2021: 95 ML/MIN/1.73M2
EOSINOPHIL # BLD AUTO: 744 CELLS/UL (ref 15–500)
EOSINOPHIL NFR BLD AUTO: 8 %
ERYTHROCYTE [DISTWIDTH] IN BLOOD BY AUTOMATED COUNT: 14.9 % (ref 11–15)
GLUCOSE SERPL-MCNC: 134 MG/DL (ref 65–99)
HCT VFR BLD AUTO: 44.1 % (ref 38.5–50)
HDLC SERPL-MCNC: 47 MG/DL
HGB BLD-MCNC: 14.2 G/DL (ref 13.2–17.1)
LDLC SERPL CALC-MCNC: 67 MG/DL (CALC)
LYMPHOCYTES # BLD AUTO: 1618 CELLS/UL (ref 850–3900)
LYMPHOCYTES NFR BLD AUTO: 17.4 %
MCH RBC QN AUTO: 27.7 PG (ref 27–33)
MCHC RBC AUTO-ENTMCNC: 32.2 G/DL (ref 32–36)
MCV RBC AUTO: 86.1 FL (ref 80–100)
MGC ASCENT PFT - FEV1 - POST: 2
MGC ASCENT PFT - FEV1 - PRE: 1.68
MGC ASCENT PFT - FEV1 - PREDICTED: 3.42
MGC ASCENT PFT - FVC - POST: 3.18
MGC ASCENT PFT - FVC - PRE: 2.8
MGC ASCENT PFT - FVC - PREDICTED: 4.51
MONOCYTES # BLD AUTO: 716 CELLS/UL (ref 200–950)
MONOCYTES NFR BLD AUTO: 7.7 %
NEUTROPHILS # BLD AUTO: 6129 CELLS/UL (ref 1500–7800)
NEUTROPHILS NFR BLD AUTO: 65.9 %
NONHDLC SERPL-MCNC: 84 MG/DL (CALC)
PIGEON SERUM AB QL ID: NORMAL
PLATELET # BLD AUTO: 164 THOUSAND/UL (ref 140–400)
PMV BLD REES-ECKER: 10.9 FL (ref 7.5–12.5)
POTASSIUM SERPL-SCNC: 3.9 MMOL/L (ref 3.5–5.3)
PROT SERPL-MCNC: 6.8 G/DL (ref 6.1–8.1)
RBC # BLD AUTO: 5.12 MILLION/UL (ref 4.2–5.8)
RHEUMATOID FACT SERPL-ACNC: 22 IU/ML
S RECTIVIRGULA AB SER QL ID: NORMAL
S VIRIDIS AB SER QL ID: NORMAL
SODIUM SERPL-SCNC: 139 MMOL/L (ref 135–146)
T CANDIDUS AB SER QL: NORMAL
T VULGARIS AB SER QL ID: NORMAL
TRIGL SERPL-MCNC: 83 MG/DL
TSH SERPL-ACNC: 1.03 MIU/L (ref 0.4–4.5)
WBC # BLD AUTO: 9.3 THOUSAND/UL (ref 3.8–10.8)

## 2025-04-18 PROCEDURE — 94640 AIRWAY INHALATION TREATMENT: CPT

## 2025-04-18 PROCEDURE — 94060 EVALUATION OF WHEEZING: CPT

## 2025-04-18 PROCEDURE — 94618 PULMONARY STRESS TESTING: CPT

## 2025-04-18 PROCEDURE — 2500000001 HC RX 250 WO HCPCS SELF ADMINISTERED DRUGS (ALT 637 FOR MEDICARE OP): Performed by: INTERNAL MEDICINE

## 2025-04-18 RX ORDER — ALBUTEROL SULFATE 90 UG/1
4 INHALANT RESPIRATORY (INHALATION) ONCE
Status: COMPLETED | OUTPATIENT
Start: 2025-04-18 | End: 2025-04-18

## 2025-04-18 RX ORDER — ALBUTEROL SULFATE 0.83 MG/ML
3 SOLUTION RESPIRATORY (INHALATION) ONCE
Status: COMPLETED | OUTPATIENT
Start: 2025-04-18 | End: 2025-04-18

## 2025-04-18 RX ADMIN — ALBUTEROL SULFATE 4 PUFF: 90 AEROSOL, METERED RESPIRATORY (INHALATION) at 11:23

## 2025-04-19 LAB
A ALTERNATA IGE QN: 0.23 KU/L
A ALTERNATA IGE RAST: ABNORMAL
A FUMIGATUS IGE QN: <0.1 KU/L
A FUMIGATUS IGE RAST: 0
A1AT PHENOTYP SERPL-IMP: NORMAL
BERMUDA GRASS IGE QN: <0.1 KU/L
BERMUDA GRASS IGE RAST: 0
BOXELDER IGE QN: <0.1 KU/L
BOXELDER IGE RAST: 0
C HERBARUM IGE QN: 0.11 KU/L
C HERBARUM IGE RAST: ABNORMAL
CALIF WALNUT POLN IGE QN: <0.1 KU/L
CALIF WALNUT POLN IGE RAST: 0
CAT DANDER IGE QN: <0.1 KU/L
CAT DANDER IGE RAST: 0
CMN PIGWEED IGE QN: <0.1 KU/L
CMN PIGWEED IGE RAST: 0
COMMON RAGWEED IGE QN: <0.1 KU/L
COMMON RAGWEED IGE RAST: 0
COTTONWOOD IGE QN: <0.1 KU/L
COTTONWOOD IGE RAST: 0
D FARINAE IGE QN: <0.1 KU/L
D FARINAE IGE RAST: 0
D PTERONYSS IGE QN: 0.16 KU/L
D PTERONYSS IGE RAST: ABNORMAL
DOG (RCAN F) 1 IGE QN: <0.1 KU/L
DOG (RCAN F) 2 IGE QN: <0.1 KU/L
DOG (RCAN F) 4 IGE QN: <0.1 KU/L
DOG (RCAN F) 5 IGE QN: <0.1 KU/L
DOG (RCAN F) 6 IGE QN: <0.1 KU/L
DOG DANDER IGE QN: 0.12 KU/L
DOG DANDER IGE RAST: ABNORMAL
IGE SERPL-ACNC: 224 KU/L
LONDON PLANE IGE QN: <0.1 KU/L
LONDON PLANE IGE RAST: 0
MOUSE URINE PROT IGE QN: <0.1 KU/L
MOUSE URINE PROT IGE RAST: 0
MT JUNIPER IGE QN: 0.3 KU/L
MT JUNIPER IGE RAST: ABNORMAL
P NOTATUM IGE QN: <0.1 KU/L
P NOTATUM IGE RAST: 0
PECAN/HICK TREE IGE QN: <0.1 KU/L
PECAN/HICK TREE IGE RAST: 0
QUEST DOG DANDER COMP PNL CAN F 3 (E221) IGE: <0.1 KU/L
REF LAB TEST REF RANGE: NORMAL
ROACH IGE QN: <0.1 KU/L
ROACH IGE RAST: 0
SALTWORT IGE QN: <0.1 KU/L
SALTWORT IGE RAST: 0
SHEEP SORREL IGE QN: <0.1 KU/L
SHEEP SORREL IGE RAST: 0
SILVER BIRCH IGE QN: <0.1 KU/L
SILVER BIRCH IGE RAST: 0
TIMOTHY IGE QN: <0.1 KU/L
TIMOTHY IGE RAST: 0
WHITE ASH IGE QN: <0.1 KU/L
WHITE ASH IGE RAST: 0
WHITE ELM IGE QN: <0.1 KU/L
WHITE ELM IGE RAST: 0
WHITE MULBERRY IGE QN: <0.1 KU/L
WHITE MULBERRY IGE RAST: 0
WHITE OAK IGE QN: <0.1 KU/L
WHITE OAK IGE RAST: 0

## 2025-04-21 ENCOUNTER — TELEPHONE (OUTPATIENT)
Dept: PULMONOLOGY | Facility: HOSPITAL | Age: 65
End: 2025-04-21
Payer: COMMERCIAL

## 2025-04-21 DIAGNOSIS — J43.2 CENTRILOBULAR EMPHYSEMA (MULTI): Primary | ICD-10-CM

## 2025-04-21 NOTE — TELEPHONE ENCOUNTER
Patient acknowledged understanding. All questions answered at this time. Patient scheduled for 7/30.    ----- Message from Clemente Betancourt sent at 4/21/2025  1:26 PM EDT -----  Please advise patient that his allergy testing was positive for Dust Mites, some molds, dog dander and pollens. Avoidance of exposure to allergens will help his symptoms.     ----- Message -----  From: [employer] Results In  Sent: 4/18/2025  12:47 PM EDT  To: Clemente Betancourt MD

## 2025-04-21 NOTE — TELEPHONE ENCOUNTER
Patient acknowledged understanding. All questions answered at this time. Patient scheduled for 7/30.    ----- Message from Clemente Betancourt sent at 4/21/2025  1:28 PM EDT -----  Moderate to severe COPD is noted on PFTs. The oxygen testing did not show need for O2 use during activity. Advise patient to continue to use Trelegy inhaler and follow up with me on regular basis.   ----- Message -----  From: Interface, Pft Results In  Sent: 4/18/2025  12:02 PM EDT  To: Clemente Betancourt MD

## 2025-04-26 LAB
A FUMIGATUS AB SER QL ID: NEGATIVE
ANA PAT SER IF-IMP: ABNORMAL
ANA SER QL IF: POSITIVE
ANA TITR SER IF: ABNORMAL TITER
ANCA AB SER QL: ABNORMAL
C-ANCA TITR SER IF: ABNORMAL TITER
CCP IGG SERPL-ACNC: <16 UNITS
CENTROMERE B AB SER-ACNC: ABNORMAL AI
DSDNA AB SER-ACNC: <1 IU/ML
ENA JO1 AB SER IA-ACNC: ABNORMAL AI
ENA RNP AB SER-ACNC: ABNORMAL AI
ENA SCL70 AB SER IA-ACNC: ABNORMAL AI
ENA SM AB SER IA-ACNC: ABNORMAL AI
ENA SM+RNP AB SER IA-ACNC: ABNORMAL AI
ENA SS-A AB SER IA-ACNC: ABNORMAL AI
ENA SS-B AB SER IA-ACNC: ABNORMAL AI
LABORATORY COMMENT REPORT: ABNORMAL
NUCLEOSOME AB SER IA-ACNC: ABNORMAL AI
PIGEON SERUM AB QL ID: NEGATIVE
RHEUMATOID FACT SERPL-ACNC: 22 IU/ML
RIBOSOMAL P AB SER-ACNC: ABNORMAL AI
S RECTIVIRGULA AB SER QL ID: NEGATIVE
S VIRIDIS AB SER QL ID: NEGATIVE
T CANDIDUS AB SER QL: NEGATIVE
T VULGARIS AB SER QL ID: NEGATIVE

## 2025-04-28 ENCOUNTER — APPOINTMENT (OUTPATIENT)
Dept: RADIOLOGY | Facility: HOSPITAL | Age: 65
End: 2025-04-28
Payer: COMMERCIAL

## 2025-05-07 ENCOUNTER — APPOINTMENT (OUTPATIENT)
Dept: PHARMACY | Facility: HOSPITAL | Age: 65
End: 2025-05-07
Payer: COMMERCIAL

## 2025-05-15 DIAGNOSIS — J84.10 PULMONARY FIBROSIS (MULTI): Primary | ICD-10-CM

## 2025-05-15 DIAGNOSIS — R76.8 P-ANCA AND MPO ANTIBODIES POSITIVE: ICD-10-CM

## 2025-05-19 ENCOUNTER — APPOINTMENT (OUTPATIENT)
Dept: RADIOLOGY | Facility: HOSPITAL | Age: 65
End: 2025-05-19
Payer: COMMERCIAL

## 2025-05-19 ENCOUNTER — TELEPHONE (OUTPATIENT)
Dept: PULMONOLOGY | Facility: HOSPITAL | Age: 65
End: 2025-05-19

## 2025-05-19 DIAGNOSIS — R59.0 MEDIASTINAL ADENOPATHY: ICD-10-CM

## 2025-05-19 PROCEDURE — 2550000001 HC RX 255 CONTRASTS: Mod: JZ | Performed by: INTERNAL MEDICINE

## 2025-05-19 PROCEDURE — 71260 CT THORAX DX C+: CPT

## 2025-05-19 RX ADMIN — IOHEXOL 50 ML: 350 INJECTION, SOLUTION INTRAVENOUS at 14:32

## 2025-05-19 NOTE — TELEPHONE ENCOUNTER
Attempted to call patient on 5/15/2025, 5/16/2025, and 5/19/2025. Left VM instructing patient to call us back. When the patient calls back we will give him this information at that time.     ----- Message from Clemente Betancourt sent at 5/15/2025  1:42 PM EDT -----  Please advise him that certain markers are suggestive of Vasculitis and connective tissue disease. His mother had Wegener's disease and this could possibly be the similar vasculitis. I would like to   refer him to see a rheumatologist soon for an opinion preferably before his next appt with me in July.   ----- Message -----  From: [facility] Results In  Sent: 4/18/2025  12:47 PM EDT  To: Clemente Betancourt MD

## 2025-05-28 ENCOUNTER — APPOINTMENT (OUTPATIENT)
Dept: PHARMACY | Facility: HOSPITAL | Age: 65
End: 2025-05-28
Payer: COMMERCIAL

## 2025-06-05 ENCOUNTER — TELEPHONE (OUTPATIENT)
Dept: PULMONOLOGY | Facility: HOSPITAL | Age: 65
End: 2025-06-05
Payer: MEDICARE

## 2025-06-05 NOTE — TELEPHONE ENCOUNTER
Called and spoke with patient and let him know the results of his CT. Instructed him to call us back with any further questions or concerns. Patient was agreeable to treatment plan and acknowledged understanding.     ----- Message from Clemente Betancourt sent at 6/2/2025  3:30 PM EDT -----  Please advise him that his CT chest has not shown any progression of his rt lung fibrosis and enlarged lymph nodes. Overall these are stable. I will discuss further on his upcoming appt.   ----- Message -----  From: Interface, Radiology Results In  Sent: 5/20/2025   3:55 PM EDT  To: Clemente Betancourt MD

## 2025-06-12 ENCOUNTER — APPOINTMENT (OUTPATIENT)
Dept: PHARMACY | Facility: HOSPITAL | Age: 65
End: 2025-06-12
Payer: MEDICARE

## 2025-06-12 DIAGNOSIS — J45.40 MODERATE PERSISTENT ASTHMA WITHOUT COMPLICATION (HHS-HCC): ICD-10-CM

## 2025-06-12 DIAGNOSIS — J44.9 CHRONIC OBSTRUCTIVE PULMONARY DISEASE, UNSPECIFIED COPD TYPE (MULTI): ICD-10-CM

## 2025-06-12 DIAGNOSIS — J43.2 CENTRILOBULAR EMPHYSEMA (MULTI): ICD-10-CM

## 2025-06-12 RX ORDER — FLUTICASONE FUROATE, UMECLIDINIUM BROMIDE AND VILANTEROL TRIFENATATE 200; 62.5; 25 UG/1; UG/1; UG/1
1 POWDER RESPIRATORY (INHALATION) DAILY
Qty: 60 EACH | Refills: 11 | Status: SHIPPED | OUTPATIENT
Start: 2025-06-12

## 2025-06-12 NOTE — PROGRESS NOTES
"  Pharmacist Clinic: Pulmonary Management    Johnny Caicedo is a 65 y.o. male was referred to Clinical Pharmacy Team for Pulmonary assessment.   Referring Provider: Clemente Betancourt MD  Last visit: 4/4/25  Next visit: 7/30/25    Subjective   Allergies[1]    HPI    PULMONARY ASSESSMENT  Patient has been diagnosed with: COPD    Current Regimen:  Trelegy 200-62.5-25  Albuterol HFA  Duonebs    Symptom Management:  Current symptoms: dyspnea  Triggers: exertion  Alleviating factors: rescue treatment, nebs    Rescue Inhaler Use:  How often do you use your rescue inhaler? > 2x weekly but not daily    Appropriate Inhaler Technique: yes    Smoking history  - Former smoker      Objective   There were no vitals taken for this visit.    Secondary Prevention (vaccines):  -Influenza: Date [Recommended]  -PCV20: Date [2024]  -COVID: Date [Recommended]  -RSV: Date [Consider]  -TDAP: Date [Consider]  -Shingrix: Date [Recommended]    Pulmonary Functions Testing Results:  No results found for: \"FEV1\", \"FVC\", \"IPU0HVK\", \"TLC\", \"DLCO\"    Lab Review  Lab Results   Component Value Date    BILITOT 0.4 04/17/2025    CALCIUM 8.3 (L) 04/17/2025    CO2 23 04/17/2025     04/17/2025    CREATININE 0.89 04/17/2025    GLUCOSE 134 (H) 04/17/2025    ALKPHOS 40 04/17/2025    K 3.9 04/17/2025    PROT 6.8 04/17/2025     04/17/2025    AST 38 (H) 04/17/2025    ALT 55 (H) 04/17/2025    BUN 13 04/17/2025    ANIONGAP 11 04/17/2025    MG 1.86 02/07/2024    PHOS 4.5 02/04/2024    ALBUMIN 3.9 04/17/2025    LIPASE 9 02/03/2024     HEMOGLOBIN   Date Value Ref Range Status   04/17/2025 14.2 13.2 - 17.1 g/dL Final     WHITE BLOOD CELL COUNT   Date Value Ref Range Status   04/17/2025 9.3 3.8 - 10.8 Thousand/uL Final     Platelets   Date Value Ref Range Status   09/14/2024 138 (L) 150 - 450 x10*3/uL Final       The 10-year ASCVD risk score (Girma ROJAS, et al., 2019) is: 9.8%    Values used to calculate the score:      Age: 65 years      Sex: Male      Is " Non- : No      Diabetic: No      Tobacco smoker: No      Systolic Blood Pressure: 111 mmHg      Is BP treated: Yes      HDL Cholesterol: 47 mg/dL      Total Cholesterol: 131 mg/dL    Current Outpatient Medications   Medication Instructions    albuterol (ProAir HFA) 90 mcg/actuation inhaler 2 puffs, inhalation, Every 4 hours PRN, CFC free    amiodarone (PACERONE) 200 mg, oral, Daily    apixaban (ELIQUIS) 5 mg, oral, Every 12 hours    atorvastatin (LIPITOR) 40 mg, oral, Daily    atorvastatin (LIPITOR) 40 mg, oral, Daily    cetirizine (ZYRTEC) 5 mg, oral, Daily    digoxin (LANOXIN) 125 mcg, oral, Daily    digoxin (LANOXIN) 125 mcg, oral, Daily    dilTIAZem CD (CARDIZEM CD) 180 mg, oral, Daily    fluticasone (Flonase) 50 mcg/actuation nasal spray 1 spray, Daily    fluticasone-umeclidin-vilanter (Trelegy Ellipta) 200-62.5-25 mcg blister with device 1 puff, inhalation, Daily    furosemide (LASIX) 40 mg, oral, Daily    furosemide (LASIX) 40 mg, oral, Daily    ipratropium-albuteroL (Duo-Neb) 0.5-2.5 mg/3 mL nebulizer solution 3 mL, nebulization, 4 times daily PRN    montelukast (SINGULAIR) 10 mg, oral, Nightly    multivitamin tablet 1 tablet, Daily    spironolactone (ALDACTONE) 25 mg, oral, Daily    spironolactone (ALDACTONE) 25 mg, oral, Daily    venlafaxine XR (EFFEXOR-XR) 37.5 mg, oral, Daily, Swallow whole. Do not crush or chew.       Drug Interactions:  None requiring intervention    Affordability/Accessibility:  Struggles with adherence due to Rx cost and fixed income    Assessment/Plan   Problem List Items Addressed This Visit       Pulmonary emphysema (Multi)    Asthma    Relevant Medications    fluticasone-umeclidin-vilanter (Trelegy Ellipta) 200-62.5-25 mcg blister with device    Chronic obstructive lung disease (Multi)    Relevant Medications    fluticasone-umeclidin-vilanter (Trelegy Ellipta) 200-62.5-25 mcg blister with device     Reminded to send Rx assistance info to my email  Continue  current therapy   Counseled on admin and side effect mgmt  Follow up PRN for continued enrollment    Amador Escobar RPh    Continue all meds under the continuation of care with the referring provider and clinical pharmacy team.    Verbal consent to manage patient's drug therapy was obtained from the patient. They were informed they may decline to participate or withdraw from participation in pharmacy services at any time.         [1]   Allergies  Allergen Reactions    Ethyl Alcohol Hives

## 2025-06-15 DIAGNOSIS — F41.9 ANXIETY: ICD-10-CM

## 2025-06-16 RX ORDER — VENLAFAXINE HYDROCHLORIDE 37.5 MG/1
37.5 CAPSULE, EXTENDED RELEASE ORAL DAILY
Qty: 90 CAPSULE | Refills: 3 | Status: SHIPPED | OUTPATIENT
Start: 2025-06-16

## 2025-07-09 PROCEDURE — RXMED WILLOW AMBULATORY MEDICATION CHARGE

## 2025-07-10 ENCOUNTER — PHARMACY VISIT (OUTPATIENT)
Dept: PHARMACY | Facility: CLINIC | Age: 65
End: 2025-07-10
Payer: COMMERCIAL

## 2025-07-30 ENCOUNTER — OFFICE VISIT (OUTPATIENT)
Dept: PULMONOLOGY | Facility: HOSPITAL | Age: 65
End: 2025-07-30
Payer: MEDICARE

## 2025-07-30 VITALS
DIASTOLIC BLOOD PRESSURE: 87 MMHG | OXYGEN SATURATION: 98 % | HEIGHT: 74 IN | HEART RATE: 94 BPM | WEIGHT: 315 LBS | BODY MASS INDEX: 40.43 KG/M2 | SYSTOLIC BLOOD PRESSURE: 137 MMHG | RESPIRATION RATE: 16 BRPM

## 2025-07-30 DIAGNOSIS — J45.40 MODERATE PERSISTENT ASTHMA WITHOUT COMPLICATION (HHS-HCC): ICD-10-CM

## 2025-07-30 DIAGNOSIS — I50.812 CHRONIC RIGHT-SIDED HEART FAILURE: ICD-10-CM

## 2025-07-30 DIAGNOSIS — G47.33 OSA (OBSTRUCTIVE SLEEP APNEA): ICD-10-CM

## 2025-07-30 DIAGNOSIS — J43.2 CENTRILOBULAR EMPHYSEMA (MULTI): ICD-10-CM

## 2025-07-30 DIAGNOSIS — J30.89 PERENNIAL ALLERGIC RHINITIS WITH SEASONAL VARIATION: ICD-10-CM

## 2025-07-30 DIAGNOSIS — J30.2 PERENNIAL ALLERGIC RHINITIS WITH SEASONAL VARIATION: ICD-10-CM

## 2025-07-30 DIAGNOSIS — J44.9 CHRONIC OBSTRUCTIVE PULMONARY DISEASE, UNSPECIFIED COPD TYPE (MULTI): Primary | ICD-10-CM

## 2025-07-30 PROCEDURE — 3008F BODY MASS INDEX DOCD: CPT | Performed by: INTERNAL MEDICINE

## 2025-07-30 PROCEDURE — 99212 OFFICE O/P EST SF 10 MIN: CPT

## 2025-07-30 PROCEDURE — 99214 OFFICE O/P EST MOD 30 MIN: CPT | Performed by: INTERNAL MEDICINE

## 2025-07-30 PROCEDURE — 1159F MED LIST DOCD IN RCRD: CPT | Performed by: INTERNAL MEDICINE

## 2025-07-30 PROCEDURE — 3079F DIAST BP 80-89 MM HG: CPT | Performed by: INTERNAL MEDICINE

## 2025-07-30 PROCEDURE — 3075F SYST BP GE 130 - 139MM HG: CPT | Performed by: INTERNAL MEDICINE

## 2025-07-30 RX ORDER — ALBUTEROL SULFATE 90 UG/1
2 INHALANT RESPIRATORY (INHALATION) EVERY 4 HOURS PRN
Qty: 18 G | Refills: 11 | Status: SHIPPED | OUTPATIENT
Start: 2025-07-30 | End: 2026-07-30

## 2025-07-30 RX ORDER — ALBUTEROL SULFATE 90 UG/1
2 INHALANT RESPIRATORY (INHALATION) EVERY 4 HOURS PRN
Qty: 25.5 G | Refills: 3 | Status: SHIPPED | OUTPATIENT
Start: 2025-07-30 | End: 2026-07-30

## 2025-07-30 ASSESSMENT — ENCOUNTER SYMPTOMS
SHORTNESS OF BREATH: 1
COUGH: 1

## 2025-07-30 NOTE — PATIENT INSTRUCTIONS
Please continue taking Trelegy 1 puff once daily.   Please continue taking cetirizine 1 tablet daily, Montelukast and flonase.   Continue to wear your O2 and CPAP. Our office will obtain data from your DME.   Contact us if any worsening of your respiratory issues.   Please see rheumatologist as discussed.  I will see you back in 5 to 6 months. Please bring your CPAP with you on next visit.

## 2025-07-30 NOTE — PROGRESS NOTES
"Subjective   Patient ID:  Johnny Caicedo is a 64 y.o. male who presents for Shortness of Breath (Patient here for NPV. Patient here for shortness of breath. Patient states his breathing has good and bad days. Patient admits to shortness of breath on exertion. Patient admits to having coughing fits to where he falls and his body goes numb. Patient bringing up yellowish green on occasion. Patient wears 2L continuous. Patient wears Cpap every night. Patient not a current smoker. Patient stopped smoking 18 years ago. Patient smoked since he was a teen. Patient smoked 3 PPD. Patient has a dog at home. ) and Breathing Problem (Patient has history of being a . Patient has no other concerns for today.).  HPI  He was seeing pulmonary in [place], [place]. He states he grew up in [place], [place] but working in [employer] he moved around. He now lives with his brother. He was diagnosed with COPD around 2015. He started smoking at age of 12 and has smoked 2 to 3 ppd. He stopped smoking around 2007 because of worsening breathing issues. He started home O2 around 2015. He was diagnosed with LAUREANO around 2016 in [place], [place]. Now, he notes shortness of breath with daily activity. He gets daily intermittent cough with scant clear expectoration. He is currently on Breo inhaler and takes Duonebs prn. He also has hx of asthma as a child and teenager. He states his mother passed away from Wegener's.     He is here for follow up today. He notes improvement in his Shortness of breath since changed from breo to Trelegy \"I have noticed improvement on Trelegy than on Breo.\" He notes that heat and humidity worsens his breathing. He denies cough or expectoration and is taking his montelukast, cetirizine and flonase regularly. He states he uses his CPAP and O2 at night regularly. He still has his O2 for use as needed during the day although his 6MWT did not show exertional hypoxia. He has no acute concerns or new concerns.     Review of Systems   HENT:  " Positive for congestion and postnasal drip.    Respiratory:  Positive for cough and shortness of breath.    Cardiovascular:  Positive for leg swelling.   Allergic/Immunologic: Positive for environmental allergies.   All other systems reviewed and are negative.      Objective   Physical Exam  Vitals and nursing note reviewed.   Constitutional:       Appearance: Normal appearance. He is obese.   HENT:      Head: Normocephalic.      Nose: Nose normal.      Mouth/Throat:      Pharynx: Oropharynx is clear.     Eyes:      Extraocular Movements: Extraocular movements intact.      Conjunctiva/sclera: Conjunctivae normal.      Pupils: Pupils are equal, round, and reactive to light.       Cardiovascular:      Rate and Rhythm: Normal rate and regular rhythm.      Pulses: Normal pulses.      Heart sounds: Normal heart sounds.   Pulmonary:      Effort: Pulmonary effort is normal.      Breath sounds: Normal breath sounds.   Abdominal:      General: Bowel sounds are normal.      Palpations: Abdomen is soft.     Musculoskeletal:         General: Normal range of motion.      Cervical back: Normal range of motion.      Right lower le+ Pitting Edema present.      Left lower le+ Pitting Edema present.     Skin:     General: Skin is warm.     Neurological:      General: No focal deficit present.      Mental Status: He is alert and oriented to person, place, and time. Mental status is at baseline.     Psychiatric:         Mood and Affect: Mood normal.         Behavior: Behavior normal.         Assessment/Plan   COPD  Moderate persistent asthma  LAUREANO  Chronic allergic rhinitis  BMI 47.5  Mediastinal adenopathy  Nonspecific pulmonary fibrotic changes  GERD/patulous esophagus  Hepatic Steatosis  Chronic LE edema  Chronic respiratory failure with hypoxia, nocturnal    Recommendations:  Pt has hx of COPD and LAUREANO. He was also suspected to have concomitant asthma. He is overall doing well with residual ALLAN. He is only on Breo inhaler and  may benefit with triple therapy. His exacerbations are mostly controlled.     -Continue daily inhaler Trelegy  -Discussed Spirometry: April 2025: FEV1 49 to 58%, with significant BDR.   -He was on 2 L O2 continuous and now only uses at night. Discussed 6MWT in April 2025: no exertional hypoxia.   -continue CPAP nightly with O2 bled in. He gets his supplies on [employer]. Advised that he may need support from a local DME. He states his CPAP is only 2 years old since 2023. We will again attempt to obtain data once he notifies us name of the DME.  -IG E 224, RAST + for dust mites, dog dander and molds.  -AAT MM  -continue Montelukast and flonase for allergic rhinitis. Added Prn Cetirizine daily in AM which has helped his symptoms  -His last CT chest in Feb 2024 reported subpleural reticulation but on personal review has non-specific characteristics without honeycombing and GGOs could reflect vascular congestion too. However, has mediastinal adenopathy & retroperitoneal & periportal adenopathy. ?sarcoidosis. No clinical signs to suggest lymphoproliferative disorder.   Repeat CT chest without progression of mild rt lung fibrosis and no significant mediastinal and hilar adenopathy reported. No indication of progressive phenotype and further work up at this time.  -CTD ++ for RA factor, BARRETT and ANCA. His mother had hx of Wegener's. He does not have signs of vasculitis himself. Referral made to see rheumatologist in May 2025 but has not made an appt. Again advised and d/w RN to provide him the number to call & reschedule  -weight management in the long term.  -LE edema chronic and coronary calcifications. Follows with Dr. Laird and has been diagnosed with chronic rt sided heart failure. Reduced RV fn mild on ECHO. RVSP 31. Pt is on spironolactone and lasix.   -hepatic steatosis, recommend weight loss    Pt's symptoms have more stabilized on Trelegy and adding cetirizine to montelukast and flonase. He also reports adherence to  his CPAP and benefits of PAP on his sleep quality and daytime symptoms. Recommend continue rx consistently and contact with any questions. Again reiterated importance of seeing rheumatologist. Follow up in 5-6 months.

## 2025-08-01 ENCOUNTER — TELEPHONE (OUTPATIENT)
Dept: PULMONOLOGY | Facility: HOSPITAL | Age: 65
End: 2025-08-01
Payer: MEDICARE

## 2025-08-01 NOTE — TELEPHONE ENCOUNTER
Attempted to call patient to find out his DME to get a download on his cpap machine. Patient originally gave us the name of a doctors office and we were unable to locate who the patients DME was. Will wait for patient to call back to request DL      Patient called back and said his DME us [facility]. Called [employer] to request a download, they state they have no info on this patient since 2021 and don't even know what machine he is using.

## 2025-08-04 ENCOUNTER — APPOINTMENT (OUTPATIENT)
Dept: PRIMARY CARE | Facility: CLINIC | Age: 65
End: 2025-08-04
Payer: COMMERCIAL

## 2025-08-04 VITALS
DIASTOLIC BLOOD PRESSURE: 70 MMHG | RESPIRATION RATE: 16 BRPM | OXYGEN SATURATION: 97 % | WEIGHT: 315 LBS | SYSTOLIC BLOOD PRESSURE: 124 MMHG | HEIGHT: 74 IN | BODY MASS INDEX: 40.43 KG/M2 | HEART RATE: 81 BPM

## 2025-08-04 DIAGNOSIS — R35.1 NOCTURIA: ICD-10-CM

## 2025-08-04 DIAGNOSIS — Z00.00 ROUTINE GENERAL MEDICAL EXAMINATION AT HEALTH CARE FACILITY: Primary | ICD-10-CM

## 2025-08-04 DIAGNOSIS — F51.01 PRIMARY INSOMNIA: ICD-10-CM

## 2025-08-04 DIAGNOSIS — E08.65 DIABETES MELLITUS DUE TO UNDERLYING CONDITION WITH HYPERGLYCEMIA, WITHOUT LONG-TERM CURRENT USE OF INSULIN: ICD-10-CM

## 2025-08-04 DIAGNOSIS — I48.0 AF (PAROXYSMAL ATRIAL FIBRILLATION) (MULTI): ICD-10-CM

## 2025-08-04 DIAGNOSIS — I50.33 ACUTE ON CHRONIC DIASTOLIC (CONGESTIVE) HEART FAILURE: ICD-10-CM

## 2025-08-04 DIAGNOSIS — I50.812 CHRONIC RIGHT-SIDED HEART FAILURE: ICD-10-CM

## 2025-08-04 DIAGNOSIS — J30.1 SEASONAL ALLERGIC RHINITIS DUE TO POLLEN: ICD-10-CM

## 2025-08-04 DIAGNOSIS — E78.2 MIXED HYPERLIPIDEMIA: ICD-10-CM

## 2025-08-04 DIAGNOSIS — E11.9 DIABETES MELLITUS WITHOUT COMPLICATION: ICD-10-CM

## 2025-08-04 PROBLEM — R74.01 ELEVATION OF LEVELS OF LIVER TRANSAMINASE LEVELS: Status: ACTIVE | Noted: 2023-02-19

## 2025-08-04 PROCEDURE — G0439 PPPS, SUBSEQ VISIT: HCPCS | Performed by: FAMILY MEDICINE

## 2025-08-04 PROCEDURE — 3074F SYST BP LT 130 MM HG: CPT | Performed by: FAMILY MEDICINE

## 2025-08-04 PROCEDURE — 3078F DIAST BP <80 MM HG: CPT | Performed by: FAMILY MEDICINE

## 2025-08-04 PROCEDURE — 3008F BODY MASS INDEX DOCD: CPT | Performed by: FAMILY MEDICINE

## 2025-08-04 PROCEDURE — 1158F ADVNC CARE PLAN TLK DOCD: CPT | Performed by: FAMILY MEDICINE

## 2025-08-04 PROCEDURE — 1123F ACP DISCUSS/DSCN MKR DOCD: CPT | Performed by: FAMILY MEDICINE

## 2025-08-04 PROCEDURE — 1170F FXNL STATUS ASSESSED: CPT | Performed by: FAMILY MEDICINE

## 2025-08-04 PROCEDURE — 1159F MED LIST DOCD IN RCRD: CPT | Performed by: FAMILY MEDICINE

## 2025-08-04 PROCEDURE — 1160F RVW MEDS BY RX/DR IN RCRD: CPT | Performed by: FAMILY MEDICINE

## 2025-08-04 RX ORDER — DIGOXIN 125 MCG
125 TABLET ORAL DAILY
Qty: 90 TABLET | Refills: 3 | Status: SHIPPED | OUTPATIENT
Start: 2025-08-04

## 2025-08-04 RX ORDER — TRAZODONE HYDROCHLORIDE 50 MG/1
50 TABLET ORAL NIGHTLY PRN
Qty: 90 TABLET | Refills: 3 | Status: SHIPPED | OUTPATIENT
Start: 2025-08-04 | End: 2026-08-04

## 2025-08-04 RX ORDER — MONTELUKAST SODIUM 10 MG/1
10 TABLET ORAL NIGHTLY
Qty: 90 TABLET | Refills: 3 | Status: SHIPPED | OUTPATIENT
Start: 2025-08-04 | End: 2026-08-04

## 2025-08-04 RX ORDER — AMIODARONE HYDROCHLORIDE 200 MG/1
200 TABLET ORAL DAILY
Qty: 90 TABLET | Refills: 3 | Status: SHIPPED | OUTPATIENT
Start: 2025-08-04 | End: 2025-08-04 | Stop reason: WASHOUT

## 2025-08-04 RX ORDER — ATORVASTATIN CALCIUM 40 MG/1
40 TABLET, FILM COATED ORAL DAILY
Qty: 90 TABLET | Refills: 3 | Status: SHIPPED | OUTPATIENT
Start: 2025-08-04

## 2025-08-04 ASSESSMENT — ANXIETY QUESTIONNAIRES
4. TROUBLE RELAXING: NOT AT ALL
2. NOT BEING ABLE TO STOP OR CONTROL WORRYING: NOT AT ALL
IF YOU CHECKED OFF ANY PROBLEMS ON THIS QUESTIONNAIRE, HOW DIFFICULT HAVE THESE PROBLEMS MADE IT FOR YOU TO DO YOUR WORK, TAKE CARE OF THINGS AT HOME, OR GET ALONG WITH OTHER PEOPLE: NOT DIFFICULT AT ALL
1. FEELING NERVOUS, ANXIOUS, OR ON EDGE: NOT AT ALL
7. FEELING AFRAID AS IF SOMETHING AWFUL MIGHT HAPPEN: NOT AT ALL
3. WORRYING TOO MUCH ABOUT DIFFERENT THINGS: NOT AT ALL
GAD7 TOTAL SCORE: 0
5. BEING SO RESTLESS THAT IT IS HARD TO SIT STILL: NOT AT ALL
6. BECOMING EASILY ANNOYED OR IRRITABLE: NOT AT ALL

## 2025-08-04 ASSESSMENT — ACTIVITIES OF DAILY LIVING (ADL)
DOING_HOUSEWORK: INDEPENDENT
TAKING_MEDICATION: INDEPENDENT
GROCERY_SHOPPING: INDEPENDENT
MANAGING_FINANCES: INDEPENDENT
DRESSING: INDEPENDENT
BATHING: INDEPENDENT

## 2025-08-04 ASSESSMENT — ENCOUNTER SYMPTOMS
SLEEP DISTURBANCE: 1
DEPRESSION: 0
OCCASIONAL FEELINGS OF UNSTEADINESS: 0
SHORTNESS OF BREATH: 1
LOSS OF SENSATION IN FEET: 0

## 2025-08-04 ASSESSMENT — PATIENT HEALTH QUESTIONNAIRE - PHQ9
1. LITTLE INTEREST OR PLEASURE IN DOING THINGS: NOT AT ALL
2. FEELING DOWN, DEPRESSED OR HOPELESS: NOT AT ALL
SUM OF ALL RESPONSES TO PHQ9 QUESTIONS 1 AND 2: 0

## 2025-08-04 NOTE — PROGRESS NOTES
"Subjective   Reason for Visit: Johnny Caicedo is an 65 y.o. male here for a Medicare Wellness visit.     Past Medical, Surgical, and Family History reviewed and updated in chart.    Reviewed all medications by prescribing practitioner or clinical pharmacist (such as prescriptions, OTCs, herbal therapies and supplements) and documented in the medical record.    HPI  Johnny is here for follow up. Biggest change is sleep. He tries to go to sleep with CPAP (with oxygen) and he seems to wake up three hours into night and cannot fall asleep, up for two -four hours, then right back up, sometimes he keeps sleeping. Labs looked good 6MWT showed little desaturation.4/2025.  He has good days and bad days regarding his shortness of breath   Patient Care Team:  Gwendolyn Bowling MD as PCP - General (Family Medicine)  Gwendolyn Bowling MD as PCP - [employer] PCP     Review of Systems   Respiratory:  Positive for shortness of breath.    Cardiovascular:  Positive for leg swelling.   Psychiatric/Behavioral:  Positive for sleep disturbance.    All other systems reviewed and are negative.      Objective   Vitals:  /70   Pulse 81   Resp 16   Ht 1.88 m (6' 2\")   Wt (!) 165 kg (363 lb)   SpO2 97%   BMI 46.61 kg/m²       Physical Exam  Vitals reviewed.   Constitutional:       Appearance: Normal appearance. He is obese.   HENT:      Head: Normocephalic and atraumatic.      Right Ear: Tympanic membrane normal.      Left Ear: Tympanic membrane normal.      Nose: Nose normal.      Mouth/Throat:      Mouth: Mucous membranes are moist.      Pharynx: Oropharynx is clear.     Eyes:      Extraocular Movements: Extraocular movements intact.      Conjunctiva/sclera: Conjunctivae normal.      Pupils: Pupils are equal, round, and reactive to light.       Cardiovascular:      Rate and Rhythm: Normal rate and regular rhythm.      Pulses: Normal pulses.      Heart sounds: Normal heart sounds.   Pulmonary:      Effort: " Pulmonary effort is normal.      Comments: Decreased breath sounds.   Abdominal:      General: Abdomen is flat. Bowel sounds are normal.      Palpations: Abdomen is soft.     Musculoskeletal:         General: Deformity present.      Cervical back: Normal range of motion and neck supple.     Skin:     General: Skin is warm and dry.      Capillary Refill: Capillary refill takes less than 2 seconds.     Neurological:      General: No focal deficit present.      Mental Status: He is alert and oriented to person, place, and time.     Psychiatric:         Mood and Affect: Mood normal.         Behavior: Behavior normal.         Assessment & Plan  Mixed hyperlipidemia  Stable and controlled  4/17/2025 labwork   Orders:    atorvastatin (Lipitor) 40 mg tablet; Take 1 tablet (40 mg) by mouth once daily.    AF (paroxysmal atrial fibrillation) (Multi)  No longer on amiodarone per patient chart Eliquis diltiazem  Orders:    digoxin (Lanoxin) 125 MCG tablet; Take 1 tablet (125 mcg) by mouth once daily.    Chronic right-sided heart failure  Digoxin, diltiazem, furosemide, spironolactone,       Seasonal allergic rhinitis due to pollen  And Zyrtec  Orders:    montelukast (Singulair) 10 mg tablet; Take 1 tablet (10 mg) by mouth once daily at bedtime.    Routine general medical examination at health care facility    Orders:    1 Year Follow Up In [facility] - PCP - Wellness Exam; Future    Primary insomnia  Goal is to try and keep patient sleep for a bit longer as he is waking up 2 to 3 hours after he goes to sleep  Orders:    traZODone (Desyrel) 50 mg tablet; Take 1 tablet (50 mg) by mouth as needed at bedtime for sleep.    Nocturia    Orders:    Prostate Spec.Ag,Screen; Future    Diabetes mellitus due to underlying condition with hyperglycemia, without long-term current use of insulin  Stable and controlled.  Patient is on no medications

## 2025-08-04 NOTE — ASSESSMENT & PLAN NOTE
No longer on amiodarone per patient chart Eliquis diltiazem  Orders:    digoxin (Lanoxin) 125 MCG tablet; Take 1 tablet (125 mcg) by mouth once daily.

## 2025-08-04 NOTE — ASSESSMENT & PLAN NOTE
Stable and controlled  4/17/2025 labwork   Orders:    atorvastatin (Lipitor) 40 mg tablet; Take 1 tablet (40 mg) by mouth once daily.

## 2025-08-04 NOTE — ASSESSMENT & PLAN NOTE
And Zyrtec  Orders:    montelukast (Singulair) 10 mg tablet; Take 1 tablet (10 mg) by mouth once daily at bedtime.

## 2025-08-08 PROCEDURE — RXMED WILLOW AMBULATORY MEDICATION CHARGE

## 2025-08-12 ENCOUNTER — PHARMACY VISIT (OUTPATIENT)
Dept: PHARMACY | Facility: CLINIC | Age: 65
End: 2025-08-12
Payer: COMMERCIAL

## 2025-09-04 PROCEDURE — RXMED WILLOW AMBULATORY MEDICATION CHARGE

## 2025-09-05 ENCOUNTER — PHARMACY VISIT (OUTPATIENT)
Dept: PHARMACY | Facility: CLINIC | Age: 65
End: 2025-09-05
Payer: COMMERCIAL

## 2025-09-15 ENCOUNTER — APPOINTMENT (OUTPATIENT)
Dept: CARDIOLOGY | Facility: HOSPITAL | Age: 65
End: 2025-09-15
Payer: MEDICARE

## 2026-01-20 ENCOUNTER — APPOINTMENT (OUTPATIENT)
Dept: PULMONOLOGY | Facility: HOSPITAL | Age: 66
End: 2026-01-20
Payer: MEDICARE

## 2026-01-27 ENCOUNTER — APPOINTMENT (OUTPATIENT)
Dept: PULMONOLOGY | Facility: HOSPITAL | Age: 66
End: 2026-01-27
Payer: MEDICARE

## 2026-08-05 ENCOUNTER — APPOINTMENT (OUTPATIENT)
Dept: PRIMARY CARE | Facility: CLINIC | Age: 66
End: 2026-08-05
Payer: MEDICARE